# Patient Record
Sex: MALE | Race: WHITE | NOT HISPANIC OR LATINO | Employment: FULL TIME | URBAN - METROPOLITAN AREA
[De-identification: names, ages, dates, MRNs, and addresses within clinical notes are randomized per-mention and may not be internally consistent; named-entity substitution may affect disease eponyms.]

---

## 2017-01-03 DIAGNOSIS — I10 ESSENTIAL HYPERTENSION: Primary | ICD-10-CM

## 2017-01-03 RX ORDER — HYDROCHLOROTHIAZIDE 12.5 MG/1
12.5 TABLET ORAL DAILY
Qty: 90 TABLET | Refills: 3 | Status: SHIPPED | OUTPATIENT
Start: 2017-01-03 | End: 2017-05-16

## 2017-01-03 RX ORDER — AMLODIPINE BESYLATE 2.5 MG/1
TABLET ORAL
Qty: 180 TABLET | Refills: 3 | Status: SHIPPED | OUTPATIENT
Start: 2017-01-03 | End: 2018-01-08

## 2017-05-16 RX ORDER — HYDROCHLOROTHIAZIDE 12.5 MG/1
12.5 CAPSULE ORAL DAILY
Qty: 90 CAPSULE | Refills: 3 | COMMUNITY
Start: 2017-01-03 | End: 2018-01-08

## 2017-05-16 NOTE — TELEPHONE ENCOUNTER
Received a fax from [employer] @ [phone] regarding the hydrochlorothiazide is no longer covered in tablet form.  Remaining Rx was changed to capsules.  [facility] was informed and EPIC updated.    Carson Quiroz,   [facility]  [phone]

## 2017-05-31 DIAGNOSIS — Z76.0 ENCOUNTER FOR MEDICATION REFILL: ICD-10-CM

## 2017-05-31 RX ORDER — NAPROXEN 500 MG/1
TABLET ORAL
Qty: 60 TABLET | Refills: 2 | Status: SHIPPED | OUTPATIENT
Start: 2017-05-31 | End: 2017-09-03

## 2017-06-13 ENCOUNTER — OFFICE VISIT (OUTPATIENT)
Dept: FAMILY MEDICINE | Facility: CLINIC | Age: 62
End: 2017-06-13

## 2017-06-13 VITALS
WEIGHT: 207.6 LBS | OXYGEN SATURATION: 97 % | BODY MASS INDEX: 29.72 KG/M2 | DIASTOLIC BLOOD PRESSURE: 96 MMHG | HEART RATE: 78 BPM | RESPIRATION RATE: 20 BRPM | SYSTOLIC BLOOD PRESSURE: 152 MMHG | HEIGHT: 70 IN

## 2017-06-13 DIAGNOSIS — Z11.59 NEED FOR HEPATITIS C SCREENING TEST: ICD-10-CM

## 2017-06-13 DIAGNOSIS — R60.0 LOCALIZED EDEMA: ICD-10-CM

## 2017-06-13 DIAGNOSIS — F43.21 GRIEF REACTION: ICD-10-CM

## 2017-06-13 DIAGNOSIS — I83.893 VARICOSE VEINS OF BOTH LEGS WITH EDEMA: ICD-10-CM

## 2017-06-13 DIAGNOSIS — I10 ESSENTIAL HYPERTENSION: Primary | ICD-10-CM

## 2017-06-13 DIAGNOSIS — L82.0 INFLAMED SEBORRHEIC KERATOSIS: ICD-10-CM

## 2017-06-13 LAB
BACTERIA URINE: NORMAL
BILIRUB UR QL STRIP: 0
BLOOD URINE DIP: 0
CASTS/LPF: NORMAL
COLOR UR: YELLOW
CRYSTAL URINE: NORMAL
EPITHELIAL CELLS - QUEST: NORMAL
GLUCOSE UR STRIP-MCNC: 0 MG/DL
KETONES UR QL STRIP: 0
LEUKOCYTE ESTERASE URINE DIP: 0
MUCOUS URINE: NORMAL
NITRITE UR QL STRIP: NORMAL
PH UR STRIP: 7 PH (ref 5–9)
PROT UR QL: 0 MG/DL (ref ?–0.01)
RBC URINE: NORMAL (ref 0–3)
SP GR UR STRIP: 1.01 (ref 1–1.02)
UROBILINOGEN UR QL STRIP: 0.2 EU/DL (ref 0.2–1)
WBC URINE: NORMAL (ref 0–3)

## 2017-06-13 PROCEDURE — 99214 OFFICE O/P EST MOD 30 MIN: CPT | Mod: 25 | Performed by: FAMILY MEDICINE

## 2017-06-13 PROCEDURE — 36415 COLL VENOUS BLD VENIPUNCTURE: CPT | Performed by: FAMILY MEDICINE

## 2017-06-13 PROCEDURE — 80053 COMPREHEN METABOLIC PANEL: CPT | Mod: 90 | Performed by: FAMILY MEDICINE

## 2017-06-13 PROCEDURE — 81003 URINALYSIS AUTO W/O SCOPE: CPT | Performed by: FAMILY MEDICINE

## 2017-06-13 PROCEDURE — 17110 DESTRUCTION B9 LES UP TO 14: CPT | Performed by: FAMILY MEDICINE

## 2017-06-13 PROCEDURE — 86803 HEPATITIS C AB TEST: CPT | Mod: 90 | Performed by: FAMILY MEDICINE

## 2017-06-13 RX ORDER — POTASSIUM CHLORIDE 750 MG/1
TABLET, EXTENDED RELEASE ORAL DAILY
COMMUNITY
Start: 2015-08-27 | End: 2017-07-24

## 2017-06-13 NOTE — PROGRESS NOTES
Subjective:  Here to discuss the status of the following problem(s):(Unless noted the problem(s) is/are stable and if applicable the medicine(s) being used is/are causing no side effects or problems.)    CC: Swelling (ankles - left worse-vericose veins) and Derm Problem (left wrist- growth)    Multiple medical issues.    Problem #1 both his lower legs have edema.  His wife recently passed away and family members have been around more and noticed that his legs seem swollen.  He has some brown discoloration of the skin that he's noticed on the LEFT leg.  He does have some large veins in his legs but he says they've been present for several years,  Possibly more prominent recently.    Problem #2 essential hypertension currently on multiple medications is not had any lab work done in over a year medications include amlodipine 2.5 mg twice a day.  We discussed that amlodipine may be cured-like swollen.  He has been on this for a number of years.  Other medicine for blood pressure includes hydrochlorothiazide 12.5 mg daily    Problem #3 grief reaction.  His wife recently passed away he is in the process of grieving feels that he is well supported by his family who is been around recently.    he feels that he is managing reasonably well all things considered     ROS:  General:  NEGATIVE for fever, chills, change in weight CV:  NEGATIVE for chest pain, palpitations , no orthopnea Resp:  NEGATIVE for significant cough or SOB  Skin on left arm has rough area that won't heal present for year or two    Past Medical History:   Diagnosis Date     Dry skin 12/4/2014     Foot pain 11/6/2012    OA of both feet      HTN (hypertension)      HTN, goal below 140/90      Hyperlipidaemia LDL goal < 100      Hypokalemia 11/17/2011     Current Outpatient Prescriptions   Medication     potassium chloride SA (POTASSIUM CHLORIDE) 10 MEQ CR tablet     naproxen (NAPROSYN) 500 MG tablet     hydrochlorothiazide (MICROZIDE) 12.5 MG capsule      amLODIPine (NORVASC) 2.5 MG tablet     Multiple Vitamins-Minerals (QC MULTI-DIMA 50 & OVER PO)     Coenzyme Q10 (CO Q-10) 200 MG CAPS     Nutritional Supplements (GLUCOSAMINE COMPLEX PO)     Omega-3 Fatty Acids (FISH OIL) 1000 MG CPDR     No current facility-administered medications for this visit.       reports that he has never smoked. He has never used smokeless tobacco. He reports that he drinks alcohol. He reports that he does not use illicit drugs.      EXAM:  BP: 152/96   Pulse: 78      Wt Readings from Last 2 Encounters:   06/13/17 94.2 kg (207 lb 9.6 oz)   11/28/16 94.3 kg (207 lb 12.8 oz)       BMI= Body mass index is 29.79 kg/(m^2).    EXAM:   APPEARANCE: = Nrl  Conj/Eyelids = Nrl  Ears/Nose = Nrl  Neck = Nrl  Resp effort = Nrl  Breath Sounds = Nrl  Heart Rate, Rythym, & sounds (no Murm)  = Nrl  Carotid Art's = Nrl  Abdomen: soft, nontender, no masses, & bowel sounds = Nrl  Digits and Nails =Nrl  SKIN: mid left forearm  Rough brown 5 mm crust with sharp borders  Ext (edema) =  2+mid tibia  Large ropey veins left > right and mild pigmentation left shin   Recent/Remote Memory = Nrl  Mood/Affect =  Sad , insight good .      Cryo therapy to left forearm lesion 30 sec x2    Assessment/Plan:  Abdiaziz was seen today for swelling and derm problem.    Diagnoses and all orders for this visit:    Essential hypertension  Not well controlled REC follow up with Dr Wray  Localized edema  -     Comp. Metabolic Panel (14) ([facility])  -     Urinalysis w/reflex protein, bili ([facility])  -     GENERAL SURG ADULT REFERRAL  Discussed amlodipine as possibel source. Could try stoppin and then follow up Dr VIDAL  ALSO varicosities likely play a role - get jobst and see surgery to see if sherry for  Treatment of veins    Varicose veins of both legs with edema  -     GENERAL SURG ADULT REFERRAL    Inflamed seborrheic keratosis  -     DESTRUCT BENIGN LESION, UP TO 14  woound care discussed , if not resolved in 3  Weeks recheck    Need for  hepatitis C screening test  -     HCV Antibody ([facility])    Grief reaction  Doing well, encouraged call if not well              López Cohn  [facility]

## 2017-06-13 NOTE — PATIENT INSTRUCTIONS
This is a place you can go to be measured for Jobst or other type of compression stockings for edema.     [facility]:          [address], [address]                                    [place] [address]                                    Phone:  [phone]                                    Fax:  [phone]                                    Billing office:  [phone]    20- 30  MM HG PRESURE

## 2017-06-13 NOTE — MR AVS SNAPSHOT
After Visit Summary   6/13/2017    Abdiaziz Kimball    MRN: [id number]           Patient Information     Date Of Birth          1955        Visit Information        Provider Department      6/13/2017 7:45 AM López Cohn MD [facility]        Today's Diagnoses     Essential hypertension    -  1    Localized edema        Varicose veins of both legs with edema          Care Instructions    This is a place you can go to be measured for Jobst or other type of compression stockings for edema.     [facility]:          [address], [address]                                    [place] [address]                                    Phone:  [phone]                                    Fax:  [phone]                                    Billing office:  [phone]    20- 30  MM HG PRESURE           Follow-ups after your visit        Additional Services     GENERAL SURG ADULT REFERRAL       Your provider has referred you to: [facility]: [facility] [place] [phone]   [email or web address]  DR MCDANIEL OR DR ETIENNE FOR EVAL OF VARICOSE  VEINS AND EDEMA     Please be aware that coverage of these services is subject to the terms and limitations of your health insurance plan.  Call member services at your health plan with any benefit or coverage questions.      Please bring the following with you to your appointment:    (1) Any X-Rays, CTs or MRIs which have been performed.  Contact the facility where they were done to arrange for  prior to your scheduled appointment.   (2) List of current medications   (3) This referral request   (4) Any documents/labs given to you for this referral                  Who to contact     If you have questions or need follow up information about today's clinic visit or your schedule please contact  "[facility] directly at [phone].  Normal or non-critical lab and imaging results will be communicated to you by MyChart, letter or phone within 4 business days after the clinic has received the results. If you do not hear from us within 7 days, please contact the clinic through [employer] or phone. If you have a critical or abnormal lab result, we will notify you by phone as soon as possible.  Submit refill requests through [employer] or call your pharmacy and they will forward the refill request to us. Please allow 3 business days for your refill to be completed.          Additional Information About Your Visit        [employer] Information     [employer] gives you secure access to your electronic health record. If you see a primary care provider, you can also send messages to your care team and make appointments. If you have questions, please call your primary care clinic.  If you do not have a primary care provider, please call [phone] and they will assist you.        Care EveryWhere ID     This is your Care EveryWhere ID. This could be used by other organizations to access your [facility] medical records  [id number]        Your Vitals Were     Pulse Respirations Height Pulse Oximetry BMI (Body Mass Index)       78 20 1.778 m (5' 10\") 97% 29.79 kg/m2        Blood Pressure from Last 3 Encounters:   06/13/17 (!) 152/96   11/28/16 132/84   12/31/15 138/78    Weight from Last 3 Encounters:   06/13/17 94.2 kg (207 lb 9.6 oz)   11/28/16 94.3 kg (207 lb 12.8 oz)   12/31/15 91.6 kg (202 lb)              We Performed the Following     Comp. Metabolic Panel (14) ([facility])     GENERAL SURG ADULT REFERRAL     Urinalysis w/reflex protein, bili ([facility])          Today's Medication Changes          These changes are accurate as of: 6/13/17  8:02 AM.  If you have any questions, ask your nurse or doctor.               These medicines have changed or have updated prescriptions.        Dose/Directions    potassium chloride " 10 MEQ CR tablet   This may have changed:  Another medication with the same name was removed. Continue taking this medication, and follow the directions you see here.   Generic drug:  potassium chloride SA   Changed by:  López Cohn MD        Take by mouth daily   Refills:  0                Primary Care Provider Office Phone # Fax #    El Wray [phone]       [facility] [address]  [facility] [address]        Thank you!     Thank you for choosing [facility]  for your care. Our goal is always to provide you with excellent care. Hearing back from our patients is one way we can continue to improve our services. Please take a few minutes to complete the written survey that you may receive in the mail after your visit with us. Thank you!             Your Updated Medication List - Protect others around you: Learn how to safely use, store and throw away your medicines at www.disposemymeds.org.          This list is accurate as of: 6/13/17  8:02 AM.  Always use your most recent med list.                   Brand Name Dispense Instructions for use    amLODIPine 2.5 MG tablet    NORVASC    180 tablet    TAKE ONE TABLET BY MOUTH TWO TIMES A DAY       coenzyme Q-10 200 MG Caps      Take  by mouth daily.       Fish Oil 1000 MG Cpdr      Take  by mouth. Takes 2000mg       GLUCOSAMINE COMPLEX PO      Take 300 mg by mouth.       hydrochlorothiazide 12.5 MG capsule    MICROZIDE    90 capsule    Take 1 capsule (12.5 mg) by mouth daily       naproxen 500 MG tablet    NAPROSYN    60 tablet    TAKE ONE TABLET BY MOUTH TWO TIMES A DAY WITH MEALS       potassium chloride 10 MEQ CR tablet   Generic drug:  potassium chloride SA      Take by mouth daily       QC MULTI-DIMA 50 & OVER PO      Take  by mouth daily.

## 2017-06-14 LAB
ALBUMIN SERPL-MCNC: 4.4 G/DL (ref 3.6–4.8)
ALBUMIN/GLOB SERPL: 1.8 {RATIO} (ref 1.2–2.2)
ALP SERPL-CCNC: 76 IU/L (ref 39–117)
ALT SERPL-CCNC: 16 IU/L (ref 0–44)
AST SERPL-CCNC: 17 IU/L (ref 0–40)
BILIRUB SERPL-MCNC: 0.6 MG/DL (ref 0–1.2)
BUN SERPL-MCNC: 24 MG/DL (ref 8–27)
BUN/CREATININE RATIO: 33 (ref 10–24)
CALCIUM SERPL-MCNC: 8.8 MG/DL (ref 8.6–10.2)
CHLORIDE SERPLBLD-SCNC: 102 MMOL/L (ref 96–106)
CREAT SERPL-MCNC: 0.73 MG/DL (ref 0.76–1.27)
EGFR IF AFRICN AM: 114 ML/MIN/1.73
EGFR IF NONAFRICN AM: 99 ML/MIN/1.73
GLOBULIN, TOTAL: 2.5 G/DL (ref 1.5–4.5)
GLUCOSE SERPL-MCNC: 93 MG/DL (ref 65–99)
HCV AB SERPL QL IA: <0.1 S/CO RATIO (ref 0–0.9)
POTASSIUM SERPL-SCNC: 3.2 MMOL/L (ref 3.5–5.2)
PROT SERPL-MCNC: 6.9 G/DL (ref 6–8.5)
SODIUM SERPL-SCNC: 144 MMOL/L (ref 134–144)
TOTAL CO2: 25 MMOL/L (ref 18–28)

## 2017-06-15 ENCOUNTER — TELEPHONE (OUTPATIENT)
Dept: FAMILY MEDICINE | Facility: CLINIC | Age: 62
End: 2017-06-15

## 2017-06-15 DIAGNOSIS — E87.6 LOW SERUM POTASSIUM: Primary | ICD-10-CM

## 2017-06-15 NOTE — TELEPHONE ENCOUNTER
Patient returned phone call for results.  Results given per Dr. Knowles, patient has enough K+ to take x 10 days.  He will RTC in 10 days for repeat K+ level, this order is entered.  Yamilka Rios

## 2017-06-15 NOTE — TELEPHONE ENCOUNTER
----- Message from López Cohn MD sent at 6/14/2017  5:49 PM CDT -----  Yamilka / Maggie- call to be sure Varun see this. His potassium is too low needs to increase his potassium to 20 carlos daily and repeat his K+ in 10 days. The rest of labs ok. please update med list. Camilo Cohn MD

## 2017-06-19 ENCOUNTER — OFFICE VISIT (OUTPATIENT)
Dept: FAMILY MEDICINE | Facility: CLINIC | Age: 62
End: 2017-06-19

## 2017-06-19 VITALS
DIASTOLIC BLOOD PRESSURE: 78 MMHG | TEMPERATURE: 97.6 F | BODY MASS INDEX: 30.66 KG/M2 | SYSTOLIC BLOOD PRESSURE: 132 MMHG | OXYGEN SATURATION: 96 % | HEIGHT: 69 IN | HEART RATE: 84 BPM | WEIGHT: 207 LBS | RESPIRATION RATE: 16 BRPM

## 2017-06-19 DIAGNOSIS — S60.417A ABRASION OF LEFT LITTLE FINGER, INITIAL ENCOUNTER: Primary | ICD-10-CM

## 2017-06-19 PROCEDURE — 99213 OFFICE O/P EST LOW 20 MIN: CPT | Performed by: FAMILY MEDICINE

## 2017-06-19 NOTE — MR AVS SNAPSHOT
After Visit Summary   6/19/2017    Abdiaziz Kimball    MRN: [id number]           Patient Information     Date Of Birth          1955        Visit Information        Provider Department      6/19/2017 4:15 PM Lela Mejia MD [facility]        Care Instructions     [facility]     Instructions following Minor Surgery  1) Keep the wound clean and dry for 24 hours.  After this time you may take a shower or bath, then PAT WOUND DRY.       Apply a small amount of antibiotic ointment (Bacitracin, Polysporin, etc.) . Keep covered with a bandage.  Repeat       This process anytime the wound gets wet.    2)  Watch for signs of infection such as redness, swelling, warmth or drainage.  If you note any of these symptoms,        Please call our office  ([phone]) and schedule an appointment to see a physician.  If you have any questions regarding        Your wound, please call our nurse or after hours answering service.    .    RETURN APPOINTMENT - as needed              Follow-ups after your visit        Your next 10 appointments already scheduled     Jul 24, 2017 10:00 AM CDT   SHORT with El Wray MD   [facility] ([facility])    [address] [place] [address] [phone]              Who to contact     If you have questions or need follow up information about today's clinic visit or your schedule please contact [facility] directly at [phone].  Normal or non-critical lab and imaging results will be communicated to you by MyChart, letter or phone within 4 business days after the clinic has received the results. If you do not hear from us within 7 days, please contact the clinic through MyChart or phone. If you have a critical or abnormal lab result, we will notify you by phone as soon as possible.  Submit refill requests through [employer] or call your pharmacy and they will forward the refill request to  "us. Please allow 3 business days for your refill to be completed.          Additional Information About Your Visit        MyChart Information     [employer] gives you secure access to your electronic health record. If you see a primary care provider, you can also send messages to your care team and make appointments. If you have questions, please call your primary care clinic.  If you do not have a primary care provider, please call [phone] and they will assist you.        Care EveryWhere ID     This is your Care EveryWhere ID. This could be used by other organizations to access your [facility] medical records  [id number]        Your Vitals Were     Pulse Temperature Respirations Height Pulse Oximetry BMI (Body Mass Index)    84 97.6  F (36.4  C) (Oral) 16 1.753 m (5' 9\") 96% 30.57 kg/m2       Blood Pressure from Last 3 Encounters:   06/19/17 132/78   06/13/17 (!) 152/96   11/28/16 132/84    Weight from Last 3 Encounters:   06/19/17 93.9 kg (207 lb)   06/13/17 94.2 kg (207 lb 9.6 oz)   11/28/16 94.3 kg (207 lb 12.8 oz)              Today, you had the following     No orders found for display       Primary Care Provider Office Phone # Fax #    El Wray [phone]       [facility] [address]  [facility] [address]        Thank you!     Thank you for choosing [facility]  for your care. Our goal is always to provide you with excellent care. Hearing back from our patients is one way we can continue to improve our services. Please take a few minutes to complete the written survey that you may receive in the mail after your visit with us. Thank you!             Your Updated Medication List - Protect others around you: Learn how to safely use, store and throw away your medicines at www.disposemymeds.org.          This list is accurate as of: 6/19/17  4:26 PM.  Always use your most recent med list.                   Brand Name Dispense Instructions for use    " amLODIPine 2.5 MG tablet    NORVASC    180 tablet    TAKE ONE TABLET BY MOUTH TWO TIMES A DAY       coenzyme Q-10 200 MG Caps      Take  by mouth daily.       Fish Oil 1000 MG Cpdr      Take  by mouth. Takes 2000mg       GLUCOSAMINE COMPLEX PO      Take 300 mg by mouth.       hydrochlorothiazide 12.5 MG capsule    MICROZIDE    90 capsule    Take 1 capsule (12.5 mg) by mouth daily       naproxen 500 MG tablet    NAPROSYN    60 tablet    TAKE ONE TABLET BY MOUTH TWO TIMES A DAY WITH MEALS       potassium chloride 10 MEQ CR tablet   Generic drug:  potassium chloride SA      Take by mouth daily       QC MULTI-DIMA 50 & OVER PO      Take  by mouth daily.

## 2017-06-19 NOTE — PATIENT INSTRUCTIONS
[facility]     Instructions following Minor Surgery  1) Keep the wound clean and dry for 24 hours.  After this time you may take a shower or bath, then PAT WOUND DRY.       Apply a small amount of antibiotic ointment (Bacitracin, Polysporin, etc.) . Keep covered with a bandage.  Repeat       This process anytime the wound gets wet.    2)  Watch for signs of infection such as redness, swelling, warmth or drainage.  If you note any of these symptoms,        Please call our office  ([phone]) and schedule an appointment to see a physician.  If you have any questions regarding        Your wound, please call our nurse or after hours answering service.    .    RETURN APPOINTMENT - as needed

## 2017-06-19 NOTE — PROGRESS NOTES
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  Dropped and hand got pinched left hand pinkie finger abrasion with bleeding  Tetanus Up to date    LHD    Past Medical History:   Diagnosis Date     Dry skin 12/4/2014     Foot pain 11/6/2012    OA of both feet      HTN (hypertension)      HTN, goal below 140/90      Hyperlipidaemia LDL goal < 100      Hypokalemia 11/17/2011     Past Surgical History:   Procedure Laterality Date     ARTHROSCOPY KNEE RT/LT Left 1990's     ARTHROSCOPY KNEE RT/LT Left      ARTHROSCOPY KNEE RT/LT Left     Leon, Mark     ARTHROSCOPY KNEE RT/LT Left 2003ish    Mark Quintero     ARTHROSCOPY KNEE RT/LT Right 2005    Mark Quintero     HERNIA REPAIR, INGUINAL RT/LT Right 2004    Incarerated hernia with resection, Dr. Moraes     VASECTOMY  1988     Reviewed medication and allergy lists    White male with glasses   ROS: 10 point ROS neg other than the symptoms noted above in the HPI.  No functional loss per his report  /78  Pulse 84  Temp 97.6  F (36.4  C) (Oral)  Resp 16  Ht 1.753 m (5' 9\")  Wt 93.9 kg (207 lb)  SpO2 96%  BMI 30.57 kg/m2   NAD holding pressure on the wound to control the bleeding.   This patient was seen in clinic for workman's comp. Injury to the left pinkie finger.  Skin abrasion with area of shredded flap. Wound dressing was placed after cleansing with betadine soak and bacitracin topical ointment . (nothing needed stitches) normal pulses and movement of hand. These are shallow.   Needs to stay covered and dry. Work as tolerated. Patient given some dressing supplies.  Tetanus was up to date.    Recheck wound check as needed.    Report any fever or red streaking up arm.     Forms done for work.  Letter done for work.    ASSESSMENT / PLAN:  (A36.235I) Abrasion of left little finger, initial encounter  (primary encounter diagnosis)  Comment: work comp  Plan: wound care    Needs to stay covered and dry. " Work as tolerated. Patient given some dressing supplies.  Tetanus was up to date.    Recheck wound check as needed.    Report any fever or red streaking up arm.     Lela Mejia MD, MEd

## 2017-07-11 ENCOUNTER — APPOINTMENT (OUTPATIENT)
Dept: VASCULAR SURGERY | Facility: CLINIC | Age: 62
End: 2017-07-11
Payer: COMMERCIAL

## 2017-07-11 PROCEDURE — 99207 ZZC VEINSOLUTIONS FREE SCREENING: CPT | Performed by: SURGERY

## 2017-07-17 DIAGNOSIS — Z76.0 ENCOUNTER FOR MEDICATION REFILL: ICD-10-CM

## 2017-07-17 RX ORDER — POTASSIUM CHLORIDE 750 MG/1
TABLET, EXTENDED RELEASE ORAL
Qty: 60 TABLET | Refills: 1 | Status: SHIPPED | OUTPATIENT
Start: 2017-07-17 | End: 2017-09-13

## 2017-07-17 NOTE — TELEPHONE ENCOUNTER
potassium chloride last OV & labs 6/13/17  Angelique Camilo MA July 17, 2017 8:40 AM    BP Readings from Last 3 Encounters:   06/19/17 132/78   06/13/17 (!) 152/96   11/28/16 132/84     Last Basic Metabolic Panel:  Lab Results   Component Value Date     06/13/2017      Lab Results   Component Value Date    POTASSIUM 3.2 06/13/2017     Lab Results   Component Value Date    CHLORIDE 102 06/13/2017     Lab Results   Component Value Date    TARAN 8.8 06/13/2017     No results found for: CO2  Lab Results   Component Value Date    BUN 24 06/13/2017    BUN 33 06/13/2017     Lab Results   Component Value Date    CR 0.73 06/13/2017     Lab Results   Component Value Date    GLC 93 06/13/2017

## 2017-07-24 ENCOUNTER — OFFICE VISIT (OUTPATIENT)
Dept: FAMILY MEDICINE | Facility: CLINIC | Age: 62
End: 2017-07-24

## 2017-07-24 VITALS
HEART RATE: 69 BPM | DIASTOLIC BLOOD PRESSURE: 78 MMHG | SYSTOLIC BLOOD PRESSURE: 130 MMHG | BODY MASS INDEX: 30.27 KG/M2 | OXYGEN SATURATION: 97 % | WEIGHT: 205 LBS

## 2017-07-24 DIAGNOSIS — I10 BENIGN ESSENTIAL HYPERTENSION: Primary | ICD-10-CM

## 2017-07-24 DIAGNOSIS — M79.673 PAIN OF FOOT, UNSPECIFIED LATERALITY: ICD-10-CM

## 2017-07-24 PROCEDURE — 36415 COLL VENOUS BLD VENIPUNCTURE: CPT | Performed by: FAMILY MEDICINE

## 2017-07-24 PROCEDURE — 80048 BASIC METABOLIC PNL TOTAL CA: CPT | Mod: 90 | Performed by: FAMILY MEDICINE

## 2017-07-24 PROCEDURE — 99213 OFFICE O/P EST LOW 20 MIN: CPT | Performed by: FAMILY MEDICINE

## 2017-07-24 NOTE — MR AVS SNAPSHOT
After Visit Summary   7/24/2017    Abdiaziz Kimball    MRN: [id number]           Patient Information     Date Of Birth          1955        Visit Information        Provider Department      7/24/2017 10:00 AM El Wray MD [facility]        Today's Diagnoses     Benign essential hypertension    -  1    Pain of foot, unspecified laterality           Follow-ups after your visit        Your next 10 appointments already scheduled     Aug 08, 2017  9:00 AM CDT   Ultrasound with  [facility] Lab   [facility]    [address]  [facility] [address] [phone]            Aug 08, 2017 10:00 AM CDT   Ultrasound Results with Adama Greenwood MD   [facility]    [address]  [facility] [address] [phone]              Who to contact     If you have questions or need follow up information about today's clinic visit or your schedule please contact [facility] directly at [phone].  Normal or non-critical lab and imaging results will be communicated to you by [employer], letter or phone within 4 business days after the clinic has received the results. If you do not hear from us within 7 days, please contact the clinic through [employer] or phone. If you have a critical or abnormal lab result, we will notify you by phone as soon as possible.  Submit refill requests through [employer] or call your pharmacy and they will forward the refill request to us. Please allow 3 business days for your refill to be completed.          Additional Information About Your Visit        G[employer]dithart Information     [employer] gives you secure access to your electronic health record. If you see a primary care provider, you can also send messages to your care team and make appointments. If you have questions, please call your primary care  clinic.  If you do not have a primary care provider, please call [phone] and they will assist you.        Care EveryWhere ID     This is your Care EveryWhere ID. This could be used by other organizations to access your [facility] medical records  [id number]        Your Vitals Were     Pulse Pulse Oximetry BMI (Body Mass Index)             69 97% 30.27 kg/m2          Blood Pressure from Last 3 Encounters:   07/24/17 130/78   06/19/17 132/78   06/13/17 (!) 152/96    Weight from Last 3 Encounters:   07/24/17 93 kg (205 lb)   06/19/17 93.9 kg (207 lb)   06/13/17 94.2 kg (207 lb 9.6 oz)              We Performed the Following     Basic Metabolic Panel (8) ([facility])        Primary Care Provider Office Phone # Fax #    El Wray [phone]       [facility] [address] [place] [address]        Equal Access to Services     XIOMY TAVAREZ : Hadii aad ku hadasho Soomaali, waaxda luqadaha, qaybta kaalmada adeegyada, waxay idiin hayalidan анна kharabridget sears . So [facility] [phone].    ATENCIÓN: Si habla español, tiene a morales disposición servicios gratuitos de asistencia lingüística. Llame al [phone].    We comply with applicable federal civil rights laws and [place] laws. We do not discriminate on the basis of race, color, national origin, age, disability sex, sexual orientation or gender identity.            Thank you!     Thank you for choosing [facility]  for your care. Our goal is always to provide you with excellent care. Hearing back from our patients is one way we can continue to improve our services. Please take a few minutes to complete the written survey that you may receive in the mail after your visit with us. Thank you!             Your Updated Medication List - Protect others around you: Learn how to safely use, store and throw away your medicines at www.disposemymeds.org.          This list is accurate as of: 7/24/17 10:38 AM.  Always use  your most recent med list.                   Brand Name Dispense Instructions for use Diagnosis    amLODIPine 2.5 MG tablet    NORVASC    180 tablet    TAKE ONE TABLET BY MOUTH TWO TIMES A DAY    Essential hypertension       coenzyme Q-10 200 MG Caps      Take  by mouth daily.        Fish Oil 1000 MG Cpdr      Take  by mouth. Takes 2000mg        GLUCOSAMINE COMPLEX PO      Take 300 mg by mouth.        hydrochlorothiazide 12.5 MG capsule    MICROZIDE    90 capsule    Take 1 capsule (12.5 mg) by mouth daily        naproxen 500 MG tablet    NAPROSYN    60 tablet    TAKE ONE TABLET BY MOUTH TWO TIMES A DAY WITH MEALS    Encounter for medication refill       potassium chloride 10 MEQ CR tablet   Generic drug:  potassium chloride SA     60 tablet    TAKE ONE TABLET BY MOUTH TWICE DAILY    Encounter for medication refill       QC MULTI-DIMA 50 & OVER PO      Take  by mouth daily.        vitamin B complex with vitamin C Tabs tablet      Take 1 tablet by mouth daily

## 2017-07-24 NOTE — PROGRESS NOTES
Subjective:   Abdiaziz Kimball is a 61 year old male with hypertension.    Long discussion about wife passing    Current Outpatient Prescriptions   Medication Sig Dispense Refill     vitamin B complex with vitamin C (VITAMIN  B COMPLEX) TABS tablet Take 1 tablet by mouth daily       POTASSIUM CHLORIDE 10 MEQ CR tablet TAKE ONE TABLET BY MOUTH TWICE DAILY  60 tablet 1     naproxen (NAPROSYN) 500 MG tablet TAKE ONE TABLET BY MOUTH TWO TIMES A DAY WITH MEALS 60 tablet 2     hydrochlorothiazide (MICROZIDE) 12.5 MG capsule Take 1 capsule (12.5 mg) by mouth daily 90 capsule 3     amLODIPine (NORVASC) 2.5 MG tablet TAKE ONE TABLET BY MOUTH TWO TIMES A  tablet 3     Multiple Vitamins-Minerals (QC MULTI-DIMA 50 & OVER PO) Take  by mouth daily.       Coenzyme Q10 (CO Q-10) 200 MG CAPS Take  by mouth daily.       Nutritional Supplements (GLUCOSAMINE COMPLEX PO) Take 300 mg by mouth.       Omega-3 Fatty Acids (FISH OIL) 1000 MG CPDR Take  by mouth. Takes 2000mg        Hypertension ROS: taking medications as instructed, no medication side effects noted, no TIA's, no chest pain on exertion, no dyspnea on exertion, no swelling of ankles.   New concerns: needs to follow up on the potassium.     Objective:   /78  Pulse 69  Wt 93 kg (205 lb)  SpO2 97%  BMI 30.27 kg/m2   Appearance healthy, alert and cooperative.  General exam BP noted to be well controlled today in office, S1, S2 normal, no gallop, no murmur, chest clear, no JVD, no HSM, no edema, CVS exam  - S1, S2 normal, no murmur, click, rub or gallop, regular rate and rhythm, chest is clear without rales or wheezing, no pedal edema, no JVD, no hepatosplenomegaly.   Lab review: orders written for new lab studies as appropriate; see orders.     Assessment:    Hypertension well controlled.     Plan:   current treatment plan is effective, no change in therapy.

## 2017-07-25 LAB
BUN SERPL-MCNC: 21 MG/DL (ref 8–27)
BUN/CREATININE RATIO: 28 (ref 10–24)
CALCIUM SERPL-MCNC: 9 MG/DL (ref 8.6–10.2)
CHLORIDE SERPLBLD-SCNC: 107 MMOL/L (ref 96–106)
CREAT SERPL-MCNC: 0.75 MG/DL (ref 0.76–1.27)
EGFR IF AFRICN AM: 113 ML/MIN/1.73
EGFR IF NONAFRICN AM: 98 ML/MIN/1.73
GLUCOSE SERPL-MCNC: 87 MG/DL (ref 65–99)
POTASSIUM SERPL-SCNC: 3.6 MMOL/L (ref 3.5–5.2)
SODIUM SERPL-SCNC: 145 MMOL/L (ref 134–144)
TOTAL CO2: 24 MMOL/L (ref 18–28)

## 2017-07-27 NOTE — PROGRESS NOTES
Dear Abdiaziz,   I am writing to report that your included test results are within expected ranges. I do not suggest that we make any changes at this time.    El Wray M.D.

## 2017-08-08 ENCOUNTER — OFFICE VISIT (OUTPATIENT)
Dept: VASCULAR SURGERY | Facility: CLINIC | Age: 62
End: 2017-08-08
Payer: COMMERCIAL

## 2017-08-08 ENCOUNTER — APPOINTMENT (OUTPATIENT)
Dept: VASCULAR SURGERY | Facility: CLINIC | Age: 62
End: 2017-08-08
Payer: COMMERCIAL

## 2017-08-08 DIAGNOSIS — Z53.9 ERRONEOUS ENCOUNTER--DISREGARD: Primary | ICD-10-CM

## 2017-08-08 PROCEDURE — 93971 EXTREMITY STUDY: CPT | Performed by: SURGERY

## 2017-08-08 PROCEDURE — 99203 OFFICE O/P NEW LOW 30 MIN: CPT | Performed by: SURGERY

## 2017-08-08 NOTE — MR AVS SNAPSHOT
After Visit Summary   8/8/2017    Abdiaziz Kimball    MRN: [id number]           Patient Information     Date Of Birth          1955        Visit Information        Provider Department      8/8/2017 10:00 AM Adama Greenwood MD [facility]      Today's Diagnoses     ERRONEOUS ENCOUNTER--DISREGARD    -  1       Follow-ups after your visit        Who to contact     If you have questions or need follow up information about today's clinic visit or your schedule please contact [facility] directly at [phone].  Normal or non-critical lab and imaging results will be communicated to you by [employer], letter or phone within 4 business days after the clinic has received the results. If you do not hear from us within 7 days, please contact the clinic through [employer] or phone. If you have a critical or abnormal lab result, we will notify you by phone as soon as possible.  Submit refill requests through [employer] or call your pharmacy and they will forward the refill request to us. Please allow 3 business days for your refill to be completed.          Additional Information About Your Visit        MyChart Information     [employer] gives you secure access to your electronic health record. If you see a primary care provider, you can also send messages to your care team and make appointments. If you have questions, please call your primary care clinic.  If you do not have a primary care provider, please call [phone] and they will assist you.        Care EveryWhere ID     This is your Care EveryWhere ID. This could be used by other organizations to access your [facility] medical records  [id number]         Blood Pressure from Last 3 Encounters:   03/05/18 108/72   12/07/17 118/82   07/24/17 130/78    Weight from Last 3 Encounters:   03/05/18 189 lb 9.6 oz (86 kg)   12/07/17 193 lb (87.5 kg)   07/24/17 205 lb (93 kg)               Today, you had the following     No orders found for display       Primary Care Provider Office Phone # Fax #    El Wray [phone] [address]  [facility] [address]        Equal Access to Services     SONIDOSATNAM GRIMALDOYENNIFER : Hadii aad ku hadradhao Soomaali, waaxda luqadaha, qaybta kaalmada adeegyada, waxrubén bettinain tejan adegeneva meier laevn ah. So [facility] [phone].    ATENCIÓN: Si habla español, tiene a morales disposición servicios gratuitos de asistencia lingüística. Llame al [phone].    We comply with applicable federal civil rights laws and [place] laws. We do not discriminate on the basis of race, color, national origin, age, disability, sex, sexual orientation, or gender identity.            Thank you!     Thank you for choosing [facility]  for your care. Our goal is always to provide you with excellent care. Hearing back from our patients is one way we can continue to improve our services. Please take a few minutes to complete the written survey that you may receive in the mail after your visit with us. Thank you!             Your Updated Medication List - Protect others around you: Learn how to safely use, store and throw away your medicines at www.disposemymeds.org.          This list is accurate as of 8/8/17 11:59 PM.  Always use your most recent med list.                   Brand Name Dispense Instructions for use Diagnosis    coenzyme Q-10 200 MG Caps      Take  by mouth daily.        Fish Oil 1000 MG Cpdr      Take  by mouth. Takes 2000mg        GLUCOSAMINE COMPLEX PO      Take 300 mg by mouth.        QC MULTI-DIMA 50 & OVER PO      Take  by mouth daily.        vitamin B complex with vitamin C Tabs tablet      Take 1 tablet by mouth daily

## 2017-08-08 NOTE — PROGRESS NOTES
[facility]    Abdiaziz Kimball presents today for evaluation of prominent left calf varicosities.  He is a 61-year-old  who notes left ankle edema and some left calf aching at the end of the work day.  He occasionally requires naproxen for pain.  He has a familial history of varicose vein disease.  There is no prior history of venous intervention.  Past medical history is most notable for hypertension and multiple prior knee surgeries.  He is a nonsmoker who occasionally consumes alcohol.    Exam  Well-developed well-nourished male in no acute distress.  Blood pressure 131/81 with a pulse of 65.  The left lower extremity is notable for moderate ropey varicosities on the medial and pretibial aspects of the calf.  Trace left ankle edema.  Easily palpable dorsalis pedis pulses bilaterally.  The remainder of his physical examination was within normal limits.    Imaging:  Left leg venous ultrasound was performed in our office today.  His deep veins are normal.  There is incompetence of the left greater saphenous vein from the saphenofemoral junction to the mid calf.  The vein measures 7.6 mm at the junction and 6.7 mm distally.  It gives rise to a 9.2 mm varicosity in the proximal calf.  Remaining left leg superficial veins are unremarkable.    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    PLAN:  I had a nice discussion with Varun reviewing all the above.  Per the mandates of his insurance company we will begin with 3 months of conservative therapy utilizing compression stockings of 20-30 mmHg pressure.  He has been measured for those stockings but has not yet required them.  Anatomically he is a potential candidate for radiofrequency ablation of the left greater saphenous vein.  Ultimate treatment recommendations will be pending the outcome of our interaction once he completes 3 months of conservative therapy.    Total  face-to-face time was 30 minutes, greater than 50% spent providing counseling and education.    Macho Greenwood M.D.    Please carbon copy Dr. El Wray of the [facility]

## 2017-09-03 DIAGNOSIS — Z76.0 ENCOUNTER FOR MEDICATION REFILL: ICD-10-CM

## 2017-09-05 RX ORDER — NAPROXEN 500 MG/1
TABLET ORAL
Qty: 60 TABLET | Refills: 1 | Status: SHIPPED | OUTPATIENT
Start: 2017-09-05 | End: 2018-01-30

## 2017-09-13 DIAGNOSIS — Z76.0 ENCOUNTER FOR MEDICATION REFILL: ICD-10-CM

## 2017-09-13 NOTE — TELEPHONE ENCOUNTER
BP Medication refill    Name of medication/dose: POTASSIUM CHLORIDE 10 MEQ CR tablet; take one tablet BID; Last OV 7/15/2017    Last fill: 7/17/2017; qty 60 with 1 refill     BP Readings from Last 3 Encounters:   07/24/17 130/78   06/19/17 132/78   06/13/17 (!) 152/96         BMP within 6 months? YES     Last Basic Metabolic Panel:  Lab Results   Component Value Date     07/24/2017      Lab Results   Component Value Date    POTASSIUM 3.6 07/24/2017     Lab Results   Component Value Date    CHLORIDE 107 07/24/2017     Lab Results   Component Value Date    TARAN 9.0 07/24/2017     No results found for: CO2  Lab Results   Component Value Date    BUN 21 07/24/2017    BUN 28 07/24/2017     Lab Results   Component Value Date    CR 0.75 07/24/2017     Lab Results   Component Value Date    GLC 87 07/24/2017

## 2017-09-15 RX ORDER — POTASSIUM CHLORIDE 750 MG/1
TABLET, EXTENDED RELEASE ORAL
Qty: 60 TABLET | Refills: 0 | Status: SHIPPED | OUTPATIENT
Start: 2017-09-15 | End: 2017-10-15

## 2017-10-29 DIAGNOSIS — Z76.0 ENCOUNTER FOR MEDICATION REFILL: ICD-10-CM

## 2017-10-29 RX ORDER — POTASSIUM CHLORIDE 750 MG/1
TABLET, EXTENDED RELEASE ORAL
Qty: 60 TABLET | Refills: 0 | Status: SHIPPED | OUTPATIENT
Start: 2017-10-29 | End: 2018-03-05

## 2017-10-31 DIAGNOSIS — Z76.0 ENCOUNTER FOR MEDICATION REFILL: ICD-10-CM

## 2017-10-31 DIAGNOSIS — I10 ESSENTIAL HYPERTENSION: Primary | ICD-10-CM

## 2017-10-31 NOTE — TELEPHONE ENCOUNTER
potassium chloride last OV - & labs 7/24/17  Angelique Camilo MA October 31, 2017 4:43 PM    BP Readings from Last 3 Encounters:   07/24/17 130/78   06/19/17 132/78   06/13/17 (!) 152/96     Last Basic Metabolic Panel:  Lab Results   Component Value Date     07/24/2017      Lab Results   Component Value Date    POTASSIUM 3.6 07/24/2017     Lab Results   Component Value Date    CHLORIDE 107 07/24/2017     Lab Results   Component Value Date    TARAN 9.0 07/24/2017     No results found for: CO2  Lab Results   Component Value Date    BUN 21 07/24/2017    BUN 28 07/24/2017     Lab Results   Component Value Date    CR 0.75 07/24/2017     Lab Results   Component Value Date    GLC 87 07/24/2017

## 2017-11-02 RX ORDER — POTASSIUM CHLORIDE 750 MG/1
TABLET, EXTENDED RELEASE ORAL
Qty: 60 TABLET | Refills: 3 | Status: SHIPPED | OUTPATIENT
Start: 2017-11-02 | End: 2018-02-25

## 2017-11-28 DIAGNOSIS — Z00.00 ENCOUNTER FOR ROUTINE ADULT HEALTH EXAMINATION WITHOUT ABNORMAL FINDINGS: ICD-10-CM

## 2017-11-28 DIAGNOSIS — Z13.220 SCREENING FOR LIPOID DISORDERS: ICD-10-CM

## 2017-11-28 DIAGNOSIS — I10 ESSENTIAL HYPERTENSION: Primary | ICD-10-CM

## 2017-11-28 LAB
% GRANULOCYTES: 57.5 % (ref 42.2–75.2)
HCT VFR BLD AUTO: 44.4 % (ref 39–51)
HEMOGLOBIN: 14.8 G/DL (ref 13.4–17.5)
LYMPHOCYTES NFR BLD AUTO: 31.2 % (ref 20.5–51.1)
MCH RBC QN AUTO: 29.8 PG (ref 27–31)
MCHC RBC AUTO-ENTMCNC: 33.3 G/DL (ref 33–37)
MCV RBC AUTO: 89.5 FL (ref 80–100)
MONOCYTES NFR BLD AUTO: 11.3 % (ref 1.7–9.3)
PLATELET # BLD AUTO: 199 K/UL (ref 140–450)
RBC # BLD AUTO: 4.96 X10/CMM (ref 4.2–5.9)
WBC # BLD AUTO: 4.9 X10/CMM (ref 3.8–11)

## 2017-11-28 PROCEDURE — 85025 COMPLETE CBC W/AUTO DIFF WBC: CPT | Performed by: FAMILY MEDICINE

## 2017-11-28 PROCEDURE — 36415 COLL VENOUS BLD VENIPUNCTURE: CPT | Performed by: FAMILY MEDICINE

## 2017-11-29 LAB
ALBUMIN SERPL-MCNC: 4.3 G/DL (ref 3.6–4.8)
ALBUMIN/GLOB SERPL: 1.9 {RATIO} (ref 1.2–2.2)
ALP SERPL-CCNC: 80 IU/L (ref 39–117)
ALT SERPL-CCNC: 12 IU/L (ref 0–44)
AST SERPL-CCNC: 17 IU/L (ref 0–40)
BILIRUB SERPL-MCNC: 0.7 MG/DL (ref 0–1.2)
BUN SERPL-MCNC: 23 MG/DL (ref 8–27)
BUN/CREATININE RATIO: 29 (ref 10–24)
CALCIUM SERPL-MCNC: 8.7 MG/DL (ref 8.6–10.2)
CHLORIDE SERPLBLD-SCNC: 101 MMOL/L (ref 96–106)
CHOLEST SERPL-MCNC: 175 MG/DL (ref 100–199)
CREAT SERPL-MCNC: 0.79 MG/DL (ref 0.76–1.27)
EGFR IF AFRICN AM: 110 ML/MIN/1.73
EGFR IF NONAFRICN AM: 95 ML/MIN/1.73
GLOBULIN, TOTAL: 2.3 G/DL (ref 1.5–4.5)
GLUCOSE SERPL-MCNC: 87 MG/DL (ref 65–99)
HDLC SERPL-MCNC: 43 MG/DL
LDL/HDL RATIO: 2.6 RATIO UNITS (ref 0–3.6)
LDLC SERPL CALC-MCNC: 112 MG/DL (ref 0–99)
POTASSIUM SERPL-SCNC: 3.3 MMOL/L (ref 3.5–5.2)
PROT SERPL-MCNC: 6.6 G/DL (ref 6–8.5)
SODIUM SERPL-SCNC: 142 MMOL/L (ref 134–144)
TOTAL CO2: 26 MMOL/L (ref 18–28)
TRIGL SERPL-MCNC: 99 MG/DL (ref 0–149)
VLDLC SERPL CALC-MCNC: 20 MG/DL (ref 5–40)

## 2017-11-30 LAB — HBA1C MFR BLD: 5.2 % (ref 4.8–5.6)

## 2017-12-02 LAB
COTININE: NORMAL NG/ML
NICOTINE: NORMAL NG/ML

## 2017-12-07 ENCOUNTER — OFFICE VISIT (OUTPATIENT)
Dept: FAMILY MEDICINE | Facility: CLINIC | Age: 62
End: 2017-12-07

## 2017-12-07 VITALS
WEIGHT: 193 LBS | OXYGEN SATURATION: 97 % | BODY MASS INDEX: 28.5 KG/M2 | SYSTOLIC BLOOD PRESSURE: 118 MMHG | HEART RATE: 67 BPM | DIASTOLIC BLOOD PRESSURE: 82 MMHG

## 2017-12-07 DIAGNOSIS — Z76.0 ENCOUNTER FOR MEDICATION REFILL: Primary | ICD-10-CM

## 2017-12-07 DIAGNOSIS — G47.9 SLEEP DISTURBANCE: ICD-10-CM

## 2017-12-07 DIAGNOSIS — L40.9 PSORIASIS: Primary | ICD-10-CM

## 2017-12-07 PROCEDURE — 99213 OFFICE O/P EST LOW 20 MIN: CPT | Performed by: FAMILY MEDICINE

## 2017-12-07 RX ORDER — NYSTATIN AND TRIAMCINOLONE ACETONIDE 100000; 1 [USP'U]/G; MG/G
OINTMENT TOPICAL
Qty: 30 G | Refills: 1 | Status: SHIPPED | OUTPATIENT
Start: 2017-12-07 | End: 2020-08-25

## 2017-12-07 NOTE — PROGRESS NOTES
Problem(s) Oriented visit        SUBJECTIVE:                                                    Abdiaziz Kimball is a 62 year old male who presents to clinic today for the following health issues :    He needs some forms filled out.  He is doing ok after the death of his wife but feels lost at times  We had a nice discussion about his family.    1. Psoriasis  stable3    2. Sleep disturbance  Tough at times         Problem list, Medication list, Allergies, and Medical/Social/Surgical histories reviewed in [facility] and updated as appropriate.   Additional history: as documented    ROS:  General and Resp. completed and negative except as noted above    Histories:   Patient Active Problem List   Diagnosis     HTN (hypertension)     Hypokalemia     Foot pain     Advanced directives, counseling/discussion     Health Care Home     Dry skin     Varicose veins of both legs with edema     Past Surgical History:   Procedure Laterality Date     ARTHROSCOPY KNEE RT/LT Left 1990's     ARTHROSCOPY KNEE RT/LT Left      ARTHROSCOPY KNEE RT/LT Left     Leon, Mark     ARTHROSCOPY KNEE RT/LT Left 2003ish    Mark Quintero     ARTHROSCOPY KNEE RT/LT Right 2005    Mark Quintero     HERNIA REPAIR, INGUINAL RT/LT Right 2004    Incarerated hernia with resection, Dr. Moraes     VASECTOMY  1988       Social History   Substance Use Topics     Smoking status: Never Smoker     Smokeless tobacco: Never Used     Alcohol use 0.0 oz/week     0 Standard drinks or equivalent per week      Comment: ocassional     No family history on file.        OBJECTIVE:                                                    /82  Pulse 67  Wt 87.5 kg (193 lb)  SpO2 97%  BMI 28.5 kg/m2  Body mass index is 28.5 kg/(m^2).   APPEARANCE: = Relaxed and in no distress  Conj/Eyelids = noninjected and lids and lashes are without inflammation  PERRLA/Irises = Pupils Round Reactive to Light and Irisis without inflammation  Neck = No anterior or posterior  adenopathy appreciated.  Thyroid = Not enlarged and no masses felt  Resp effort = Calm regular breathing  Breath Sounds = Good air movement with no rales or rhonchi in any lung fields  Heart Rate, Rythym, & sounds (no Murm)  = Regular rate and rythym with no S3, S4, or murmer appreciated.  Carotid Art's = Pulses full and equal and no bruits appreciated  Abdomen = Soft, nontender, no masses, & bowel sounds in all quadrants  Liver/Spleen = Normal span and no splenomegaly noted  Digits and Nails = FROM in all finger joints, no nail dystrophy  Ext (edema) = No pretibial edema noted or elsewhere  Musculsktl =  Strength and ROM of major joints are within normal limits  SKIN = absent significant rashes or lesions   Recent/Remote Memory = Alert and Oriented x 3  Mood/Affect = Cooperative and interested     ASSESSMENT/PLAN:                                                        Abdiaziz was seen today for forms.    Diagnoses and all orders for this visit:    Psoriasis    Sleep disturbance    >15 min spent with patient, greater than 50% spent on discussion/education/planning, etc about The primary encounter diagnosis was Psoriasis. A diagnosis of Sleep disturbance was also pertinent to this visit.       See Patient Instructions    The following health maintenance items are reviewed in Epic and correct as of today:  Health Maintenance   Topic Date Due     INFLUENZA VACCINE (SYSTEM ASSIGNED)  09/01/2017     ADVANCE DIRECTIVE PLANNING Q5 YRS  11/06/2017     COLON CANCER SCREEN (SYSTEM ASSIGNED)  01/06/2021     LIPID SCREEN Q5 YR MALE (SYSTEM ASSIGNED)  11/28/2022     TETANUS IMMUNIZATION (SYSTEM ASSIGNED)  12/31/2025     HEPATITIS C SCREENING  Completed       El Wray MD  [facility]  [phone]    For any issues my office # is [phone]

## 2017-12-07 NOTE — MR AVS SNAPSHOT
"              After Visit Summary   12/7/2017    Abdiaziz Kimball    MRN: [id number]           Patient Information     Date Of Birth          1955        Visit Information        Provider Department      12/7/2017 2:00 PM El Wray MD [facility]        Today's Diagnoses     Psoriasis    -  1    Sleep disturbance          Care Instructions    Thanks for coming in to see me today!    Your next visit with us should be scheduled for Follow up in 4 months    Listed next are the medical health Maintenance items we are tracking for your care and when they are next due (or overdue!)  Our goal is 100% \"up to date.\" Keep this list handy to call and schedule what you are due for in the future!    Health Maintenance Due   Topic Date Due     INFLUENZA VACCINE (SYSTEM ASSIGNED)  09/01/2017     ADVANCE DIRECTIVE PLANNING Q5 YRS  11/06/2017                 Follow-ups after your visit        Who to contact     If you have questions or need follow up information about today's clinic visit or your schedule please contact [facility] directly at [phone].  Normal or non-critical lab and imaging results will be communicated to you by TrueFacethart, letter or phone within 4 business days after the clinic has received the results. If you do not hear from us within 7 days, please contact the clinic through LibertadCard or phone. If you have a critical or abnormal lab result, we will notify you by phone as soon as possible.  Submit refill requests through LibertadCard or call your pharmacy and they will forward the refill request to us. Please allow 3 business days for your refill to be completed.          Additional Information About Your Visit        TrueFacethart Information     LibertadCard gives you secure access to your electronic health record. If you see a primary care provider, you can also send messages to your care team and make appointments. If you have questions, please call your primary care clinic.  If you " do not have a primary care provider, please call [phone] and they will assist you.        Care EveryWhere ID     This is your Care EveryWhere ID. This could be used by other organizations to access your [place] medical records  [id number]        Your Vitals Were     Pulse Pulse Oximetry BMI (Body Mass Index)             67 97% 28.5 kg/m2          Blood Pressure from Last 3 Encounters:   12/07/17 118/82   07/24/17 130/78   06/19/17 132/78    Weight from Last 3 Encounters:   12/07/17 87.5 kg (193 lb)   07/24/17 93 kg (205 lb)   06/19/17 93.9 kg (207 lb)              Today, you had the following     No orders found for display         Today's Medication Changes          These changes are accurate as of: 12/7/17 11:59 PM.  If you have any questions, ask your nurse or doctor.               Start taking these medicines.        Dose/Directions    nystatin-triamcinolone ointment   Commonly known as:  MYCOLOG   Used for:  Encounter for medication refill   Started by:  El Wray MD        APPLY TO SCALP 2 TIMES DAILY   Quantity:  30 g   Refills:  1            Where to get your medicines      These medications were sent to [facility][id number] - [place], [place] - [address]   [address] [facility] [address]     Phone:  [phone]     nystatin-triamcinolone ointment                Primary Care Provider Office Phone # Fax #    El Wray [phone] [address] [facility] [address]        Equal Access to Services     SATNAM TAVAREZ AH: Hadii escobar ku hadasho [place], waaxda luqadaha, qaybta kaalmada adeegyada, leola idiin hayaan adegeneva elizabeth. So [facility] [phone].    ATENCIÓN: Si habla español, tiene a morales disposición servicios gratuitos de asistencia lingüística. Llame al [phone].    We comply with applicable federal civil rights laws and [place] laws. We do not discriminate on the basis of race, color, national origin, age, disability, sex, sexual  orientation, or gender identity.            Thank you!     Thank you for choosing [facility]  for your care. Our goal is always to provide you with excellent care. Hearing back from our patients is one way we can continue to improve our services. Please take a few minutes to complete the written survey that you may receive in the mail after your visit with us. Thank you!             Your Updated Medication List - Protect others around you: Learn how to safely use, store and throw away your medicines at www.disposemymeds.org.          This list is accurate as of: 12/7/17 11:59 PM.  Always use your most recent med list.                   Brand Name Dispense Instructions for use Diagnosis    amLODIPine 2.5 MG tablet    NORVASC    180 tablet    TAKE ONE TABLET BY MOUTH TWO TIMES A DAY    Essential hypertension       coenzyme Q-10 200 MG Caps      Take  by mouth daily.        Fish Oil 1000 MG Cpdr      Take  by mouth. Takes 2000mg        GLUCOSAMINE COMPLEX PO      Take 300 mg by mouth.        hydrochlorothiazide 12.5 MG capsule    MICROZIDE    90 capsule    Take 1 capsule (12.5 mg) by mouth daily        naproxen 500 MG tablet    NAPROSYN    60 tablet    TAKE ONE TABLET BY MOUTH TWICE DAILY WITH MEALS    Encounter for medication refill       nystatin-triamcinolone ointment    MYCOLOG    30 g    APPLY TO SCALP 2 TIMES DAILY    Encounter for medication refill       * potassium chloride 10 MEQ tablet    K-TAB,KLOR-CON    180 tablet    TAKE ONE TABLET BY MOUTH TWICE DAILY    Encounter for medication refill       * potassium chloride 10 MEQ tablet    K-TAB,KLOR-CON    60 tablet    TAKE ONE TABLET BY MOUTH TWICE DAILY    Encounter for medication refill       * potassium chloride 10 MEQ tablet    K-TAB,KLOR-CON    60 tablet    TAKE ONE TABLET BY MOUTH TWICE DAILY    Encounter for medication refill, Essential hypertension       QC MULTI-DIMA 50 & OVER PO      Take  by mouth daily.        vitamin B complex with  vitamin C Tabs tablet      Take 1 tablet by mouth daily        * Notice:  This list has 3 medication(s) that are the same as other medications prescribed for you. Read the directions carefully, and ask your doctor or other care provider to review them with you.

## 2017-12-11 NOTE — PATIENT INSTRUCTIONS
"Thanks for coming in to see me today!    Your next visit with us should be scheduled for Follow up in 4 months    Listed next are the medical health Maintenance items we are tracking for your care and when they are next due (or overdue!)  Our goal is 100% \"up to date.\" Keep this list handy to call and schedule what you are due for in the future!    Health Maintenance Due   Topic Date Due     INFLUENZA VACCINE (SYSTEM ASSIGNED)  09/01/2017     ADVANCE DIRECTIVE PLANNING Q5 YRS  11/06/2017         "

## 2017-12-12 NOTE — PROGRESS NOTES
Order(s) created erroneously. Erroneous order ID: [id number]   Order canceled by: DIOMEDES WAGNER   Order cancel date/time: 12/12/2017 1:45 PM

## 2018-01-08 DIAGNOSIS — I10 ESSENTIAL HYPERTENSION: ICD-10-CM

## 2018-01-08 RX ORDER — HYDROCHLOROTHIAZIDE 12.5 MG/1
CAPSULE ORAL
Qty: 30 CAPSULE | Refills: 6 | Status: SHIPPED | OUTPATIENT
Start: 2018-01-08 | End: 2018-03-05

## 2018-01-08 RX ORDER — AMLODIPINE BESYLATE 2.5 MG/1
TABLET ORAL
Qty: 60 TABLET | Refills: 2 | Status: SHIPPED | OUTPATIENT
Start: 2018-01-08 | End: 2018-03-05

## 2018-01-30 DIAGNOSIS — Z79.899 ENCOUNTER FOR LONG-TERM (CURRENT) USE OF HIGH-RISK MEDICATION: Primary | ICD-10-CM

## 2018-01-31 PROBLEM — Z79.899 ENCOUNTER FOR LONG-TERM (CURRENT) USE OF HIGH-RISK MEDICATION: Status: ACTIVE | Noted: 2018-01-31

## 2018-01-31 RX ORDER — NAPROXEN 500 MG/1
TABLET ORAL
Qty: 60 TABLET | Refills: 3 | Status: SHIPPED | OUTPATIENT
Start: 2018-01-31 | End: 2018-05-27

## 2018-01-31 NOTE — TELEPHONE ENCOUNTER
naproxen (NAPROSYN) 500 MG    LAST  Med check 12/7/17   last labs(for RX) 11/28/17  Next  appt scheduled =  none  Angelique Camilo MA

## 2018-02-25 DIAGNOSIS — I10 ESSENTIAL HYPERTENSION: ICD-10-CM

## 2018-02-25 DIAGNOSIS — Z76.0 ENCOUNTER FOR MEDICATION REFILL: ICD-10-CM

## 2018-02-25 RX ORDER — POTASSIUM CHLORIDE 750 MG/1
TABLET, EXTENDED RELEASE ORAL
Qty: 60 TABLET | Refills: 0 | Status: SHIPPED | OUTPATIENT
Start: 2018-02-25 | End: 2018-03-02

## 2018-03-02 DIAGNOSIS — I10 ESSENTIAL HYPERTENSION: ICD-10-CM

## 2018-03-02 DIAGNOSIS — Z79.899 ENCOUNTER FOR LONG-TERM (CURRENT) USE OF MEDICATIONS: Primary | ICD-10-CM

## 2018-03-02 NOTE — TELEPHONE ENCOUNTER
potassium chloride (K-TAB,KLOR-CON) 10 MEQ tablet   LAST  Med check 7/24/17   last labs(for RX) 11/28/17  Next  appt scheduled =  3/5/18 med ck  Angelique Camilo MA    BP Readings from Last 3 Encounters:   12/07/17 118/82   07/24/17 130/78   06/19/17 132/78     Last Basic Metabolic Panel:  Lab Results   Component Value Date     11/28/2017      Lab Results   Component Value Date    POTASSIUM 3.3 11/28/2017     Lab Results   Component Value Date    CHLORIDE 101 11/28/2017     Lab Results   Component Value Date    TARAN 8.7 11/28/2017     No results found for: CO2  Lab Results   Component Value Date    BUN 23 11/28/2017    BUN 29 11/28/2017     Lab Results   Component Value Date    CR 0.79 11/28/2017     Lab Results   Component Value Date    GLC 87 11/28/2017

## 2018-03-04 RX ORDER — POTASSIUM CHLORIDE 750 MG/1
TABLET, EXTENDED RELEASE ORAL
Qty: 60 TABLET | Refills: 1 | Status: SHIPPED | OUTPATIENT
Start: 2018-03-04 | End: 2018-05-27

## 2018-03-05 ENCOUNTER — OFFICE VISIT (OUTPATIENT)
Dept: FAMILY MEDICINE | Facility: CLINIC | Age: 63
End: 2018-03-05

## 2018-03-05 VITALS
RESPIRATION RATE: 12 BRPM | OXYGEN SATURATION: 98 % | DIASTOLIC BLOOD PRESSURE: 72 MMHG | BODY MASS INDEX: 27.14 KG/M2 | SYSTOLIC BLOOD PRESSURE: 108 MMHG | HEIGHT: 70 IN | HEART RATE: 62 BPM | WEIGHT: 189.6 LBS

## 2018-03-05 DIAGNOSIS — I10 ESSENTIAL HYPERTENSION: ICD-10-CM

## 2018-03-05 DIAGNOSIS — N40.1 BENIGN PROSTATIC HYPERPLASIA WITH NOCTURIA: ICD-10-CM

## 2018-03-05 DIAGNOSIS — M79.671 PAIN IN BOTH FEET: ICD-10-CM

## 2018-03-05 DIAGNOSIS — Z13.220 LIPID SCREENING: ICD-10-CM

## 2018-03-05 DIAGNOSIS — Z13.6 SCREENING FOR CARDIOVASCULAR CONDITION: ICD-10-CM

## 2018-03-05 DIAGNOSIS — E87.6 HYPOKALEMIA: Primary | ICD-10-CM

## 2018-03-05 DIAGNOSIS — R35.1 BENIGN PROSTATIC HYPERPLASIA WITH NOCTURIA: ICD-10-CM

## 2018-03-05 DIAGNOSIS — M79.672 PAIN IN BOTH FEET: ICD-10-CM

## 2018-03-05 PROCEDURE — 36415 COLL VENOUS BLD VENIPUNCTURE: CPT | Performed by: FAMILY MEDICINE

## 2018-03-05 PROCEDURE — 99213 OFFICE O/P EST LOW 20 MIN: CPT | Performed by: FAMILY MEDICINE

## 2018-03-05 RX ORDER — AMLODIPINE BESYLATE 2.5 MG/1
2.5 TABLET ORAL 2 TIMES DAILY
Qty: 60 TABLET | Refills: 3 | Status: SHIPPED | OUTPATIENT
Start: 2018-04-20 | End: 2018-07-24

## 2018-03-05 RX ORDER — HYDROCHLOROTHIAZIDE 12.5 MG/1
1 CAPSULE ORAL DAILY
Qty: 30 CAPSULE | Refills: 6 | Status: SHIPPED | OUTPATIENT
Start: 2018-04-20 | End: 2019-01-29

## 2018-03-05 NOTE — MR AVS SNAPSHOT
"              After Visit Summary   3/5/2018    Abdiaziz Kimball    MRN: [id number]           Patient Information     Date Of Birth          1955        Visit Information        Provider Department      3/5/2018 10:15 AM El Wray MD [facility]        Today's Diagnoses     Hypokalemia    -  1    Essential hypertension        Lipid screening        Pain in both feet        Screening for cardiovascular condition        Benign prostatic hyperplasia with nocturia           Follow-ups after your visit        Who to contact     If you have questions or need follow up information about today's clinic visit or your schedule please contact [facility] directly at [phone].  Normal or non-critical lab and imaging results will be communicated to you by MyChart, letter or phone within 4 business days after the clinic has received the results. If you do not hear from us within 7 days, please contact the clinic through Gratat or phone. If you have a critical or abnormal lab result, we will notify you by phone as soon as possible.  Submit refill requests through Peak 10 or call your pharmacy and they will forward the refill request to us. Please allow 3 business days for your refill to be completed.          Additional Information About Your Visit        MyChart Information     Peak 10 gives you secure access to your electronic health record. If you see a primary care provider, you can also send messages to your care team and make appointments. If you have questions, please call your primary care clinic.  If you do not have a primary care provider, please call [phone] and they will assist you.        Care EveryWhere ID     This is your Care EveryWhere ID. This could be used by other organizations to access your [place] medical records  [id number]        Your Vitals Were     Pulse Respirations Height Pulse Oximetry BMI (Body Mass Index)       62 12 1.778 m (5' 10\") 98% 27.2 " kg/m2        Blood Pressure from Last 3 Encounters:   03/05/18 108/72   12/07/17 118/82   07/24/17 130/78    Weight from Last 3 Encounters:   03/05/18 86 kg (189 lb 9.6 oz)   12/07/17 87.5 kg (193 lb)   07/24/17 93 kg (205 lb)              We Performed the Following     Basic Metabolic Panel (8) ([facility])     Hemoglobin A1C ([facility])     Lipid Panel ([facility])     Nicotine and Metabolite Quant ([facility])          Today's Medication Changes          These changes are accurate as of 3/5/18 11:03 AM.  If you have any questions, ask your nurse or doctor.               These medicines have changed or have updated prescriptions.        Dose/Directions    amLODIPine 2.5 MG tablet   Commonly known as:  NORVASC   This may have changed:  See the new instructions.   Used for:  Essential hypertension   Changed by:  El Wray MD        Dose:  2.5 mg   Start taking on:  4/20/2018   Take 1 tablet (2.5 mg) by mouth 2 times daily   Quantity:  60 tablet   Refills:  3       hydrochlorothiazide 12.5 MG capsule   Commonly known as:  MICROZIDE   This may have changed:  See the new instructions.   Used for:  Essential hypertension   Changed by:  El Wray MD        Dose:  1 capsule   Start taking on:  4/20/2018   Take 1 capsule (12.5 mg) by mouth daily   Quantity:  30 capsule   Refills:  6            Where to get your medicines      These medications were sent to [id number] - [facility] [address]  [address] [facility] [address]     Phone:  [phone]     amLODIPine 2.5 MG tablet    hydrochlorothiazide 12.5 MG capsule                Primary Care Provider Office Phone # Fax #    El Wray [phone] [address]  [facility] [address]        Equal Access to Services     SATNAM TAVAREZ AH: Riki sanderso [place], waaxda luqadaha, qaybta kaalmada adeegyada, leola elizabeth. So [facility] [phone].    ATENCIÓN: Si  roverto galicia, tiene a morales disposición servicios gratuitos de asistencia lingüística. Unique munoz [phone].    We comply with applicable federal civil rights laws and [place] laws. We do not discriminate on the basis of race, color, national origin, age, disability, sex, sexual orientation, or gender identity.            Thank you!     Thank you for choosing [facility]  for your care. Our goal is always to provide you with excellent care. Hearing back from our patients is one way we can continue to improve our services. Please take a few minutes to complete the written survey that you may receive in the mail after your visit with us. Thank you!             Your Updated Medication List - Protect others around you: Learn how to safely use, store and throw away your medicines at www.disposemymeds.org.          This list is accurate as of 3/5/18 11:03 AM.  Always use your most recent med list.                   Brand Name Dispense Instructions for use Diagnosis    amLODIPine 2.5 MG tablet   Start taking on:  4/20/2018    NORVASC    60 tablet    Take 1 tablet (2.5 mg) by mouth 2 times daily    Essential hypertension       coenzyme Q-10 200 MG Caps      Take  by mouth daily.        Fish Oil 1000 MG Cpdr      Take  by mouth. Takes 2000mg        GLUCOSAMINE COMPLEX PO      Take 300 mg by mouth.        hydrochlorothiazide 12.5 MG capsule   Start taking on:  4/20/2018    MICROZIDE    30 capsule    Take 1 capsule (12.5 mg) by mouth daily    Essential hypertension       naproxen 500 MG tablet    NAPROSYN    60 tablet    TAKE ONE TABLET BY MOUTH TWICE DAILY WITH MEALS    Encounter for long-term (current) use of high-risk medication       nystatin-triamcinolone ointment    MYCOLOG    30 g    APPLY TO SCALP 2 TIMES DAILY    Encounter for medication refill       potassium chloride 10 MEQ tablet    K-TAB,KLOR-CON    60 tablet    TAKE ONE TABLET BY MOUTH TWICE DAILY    Essential hypertension, Encounter for long-term  (current) use of medications       QC MULTI-DIMA 50 & OVER PO      Take  by mouth daily.        vitamin B complex with vitamin C Tabs tablet      Take 1 tablet by mouth daily

## 2018-03-05 NOTE — PROGRESS NOTES
Tingling in the feet and hands or after exercise   Some related to carpal tunnel in the hands.    Feet are worse when sleeps.  Reading on the internet and no problems if fallen    Blood presure remains well controlled when checked out of clinic.    Reviewed last 6 BP readings in chart:  BP Readings from Last 6 Encounters:   03/05/18 108/72   12/07/17 118/82   07/24/17 130/78   06/19/17 132/78   06/13/17 (!) 152/96   11/28/16 132/84       he has not experienced any significant side effects from medications for hypertension.    NO active cardiac complaints or symptoms with exercise.    Problem(s) Oriented visit      ROS:  General and CV completed and negative except as noted above     HISTORY:   reports that he drinks alcohol.   reports that he has never smoked. He has never used smokeless tobacco.    Past Medical History:   Diagnosis Date     Dry skin 12/4/2014     Foot pain 11/6/2012     HTN (hypertension)      HTN, goal below 140/90      Hyperlipidaemia LDL goal < 100      Hypokalemia 11/17/2011     Past Surgical History:   Procedure Laterality Date     ARTHROSCOPY KNEE RT/LT Left 1990's     ARTHROSCOPY KNEE RT/LT Left      ARTHROSCOPY KNEE RT/LT Left     [facility]     ARTHROSCOPY KNEE RT/LT Left 2003ish    Mark Quintero     ARTHROSCOPY KNEE RT/LT Right 2005    Mark Quintero     HERNIA REPAIR, INGUINAL RT/LT Right 2004    Incarerated hernia with resection, Dr. Moraes     VASECTOMY  1988       EXAM:  BP: 108/72   Pulse: 62    Temp: Data Unavailable    Wt Readings from Last 2 Encounters:   03/05/18 86 kg (189 lb 9.6 oz)   12/07/17 87.5 kg (193 lb)       BMI= Body mass index is 27.2 kg/(m^2).    EXAM:  APPEARANCE: = Relaxed and in no distress  Conj/Eyelids = noninjected and lids and lashes are without inflammation  PERRLA/Irises = Pupils Round Reactive to Light and Irisis without inflammation  Neck = No anterior or posterior adenopathy appreciated.  Thyroid = Not enlarged and no masses felt  Resp effort =  Calm regular breathing  Breath Sounds = Good air movement with no rales or rhonchi in any lung fields  Heart Rate, Rythym, & sounds (no Murm)  = Regular rate and rythym with no S3, S4, or murmer appreciated.  Carotid Art's = Pulses full and equal and no bruits appreciated  Abdomen = Soft, nontender, no masses, & bowel sounds in all quadrants  Liver/Spleen = Normal span and no splenomegaly noted  Digits and Nails = FROM in all finger joints, no nail dystrophy  Ext (edema) = No pretibial edema noted or elsewhere  Musculsktl =  Strength and ROM of major joints are within normal limits  SKIN = absent significant rashes or lesions   Recent/Remote Memory = Alert and Oriented x 3  Mood/Affect = Cooperative and interested      Assessment/Plan:  Abdiaziz was seen today for forms, hypertension and recheck medication.    Diagnoses and all orders for this visit:    Hypokalemia    Essential hypertension  -     amLODIPine (NORVASC) 2.5 MG tablet; Take 1 tablet (2.5 mg) by mouth 2 times daily  -     hydrochlorothiazide (MICROZIDE) 12.5 MG capsule; Take 1 capsule (12.5 mg) by mouth daily  -     Basic Metabolic Panel (8) ([facility])  Discussed hypertension in detail including JNC and American Heart Association guidelines for blood pressure goals.  Discussed indication for treatment and treatment options.  Discussed the importance for aggressive management of HTN to prevent vascular complications later.  Recommended lower fat, lower carbohydrate, and lower sodium (<2000 mg)diet.  Discussed required intervals for follow up on HTN, lab studies, and the need to aggresive management of other cardiac disease risk factors.  Recommened pt. follow their blood pressures outside the clinic to ensure that BPs are remaining within guidelines, and to contact me if the readings are not within guidelines so we can adjust treatment as needed.    Lipid screening    Pain in both feet  Long discussion about potential peripheral neuropathy    BPH  These  "symptoms sound most consistent with bladder irritation from an enlarged prostate (BPH).  Will make sure the PSA is normal and up to date.    Discussed treatment options for this including lifestyle changes ( i.e. restricting water intake close to bedtime or long trips, avoiding caffeine, etc.), medications (including FLOMAX, proscar, saw palmetto, etc), and referral to Urology.    Screening for cardiovascular condition  -     Lipid Panel ([facility])  -     Hemoglobin A1C ([facility])  -     Nicotine and Metabolite Quant ([facility])        COUNSELING:   reports that he has never smoked. He has never used smokeless tobacco.    Estimated body mass index is 27.2 kg/(m^2) as calculated from the following:    Height as of this encounter: 1.778 m (5' 10\").    Weight as of this encounter: 86 kg (189 lb 9.6 oz).       Appropriate preventive services were discussed with this patient, including applicable screening as appropriate for cardiovascular disease, diabetes, osteopenia/osteoporosis, and glaucoma.  As appropriate for age/gender, discussed screening for colorectal cancer, prostate cancer, breast cancer, and cervical cancer. Checklist reviewing preventive services available has been given to the patient.    Reviewed patients plan of care and provided an AVS. The Basic Care Plan (routine screening as documented in Health Maintenance) for Abdiaziz meets the Care Plan requirement. This Care Plan has been established and reviewed with the  Patient.      The following health maintenance items are reviewed in Epic and correct as of today:  Health Maintenance   Topic Date Due     ADVANCE DIRECTIVE PLANNING Q5 YRS  11/06/2017     INFLUENZA VACCINE (SYSTEM ASSIGNED)  09/01/2018     COLON CANCER SCREEN (SYSTEM ASSIGNED)  01/06/2021     LIPID SCREEN Q5 YR MALE (SYSTEM ASSIGNED)  11/28/2022     TETANUS IMMUNIZATION (SYSTEM ASSIGNED)  12/31/2025     HEPATITIS C SCREENING  Completed       El Wray  [facility]  For any " issues my office # is [phone]

## 2018-03-06 LAB
BUN SERPL-MCNC: 24 MG/DL (ref 8–27)
BUN/CREATININE RATIO: 30 (ref 10–24)
CALCIUM SERPL-MCNC: 8.9 MG/DL (ref 8.6–10.2)
CHLORIDE SERPLBLD-SCNC: 103 MMOL/L (ref 96–106)
CHOLEST SERPL-MCNC: 159 MG/DL (ref 100–199)
CREAT SERPL-MCNC: 0.79 MG/DL (ref 0.76–1.27)
EGFR IF AFRICN AM: 110 ML/MIN/1.73
EGFR IF NONAFRICN AM: 95 ML/MIN/1.73
GLUCOSE SERPL-MCNC: 96 MG/DL (ref 65–99)
HBA1C MFR BLD: 5.2 % (ref 4.8–5.6)
HDLC SERPL-MCNC: 40 MG/DL
LDL/HDL RATIO: 2.3 RATIO UNITS (ref 0–3.6)
LDLC SERPL CALC-MCNC: 93 MG/DL (ref 0–99)
POTASSIUM SERPL-SCNC: 3.9 MMOL/L (ref 3.5–5.2)
SODIUM SERPL-SCNC: 145 MMOL/L (ref 134–144)
TOTAL CO2: 28 MMOL/L (ref 18–28)
TRIGL SERPL-MCNC: 128 MG/DL (ref 0–149)
VLDLC SERPL CALC-MCNC: 26 MG/DL (ref 5–40)

## 2018-03-09 LAB
COTININE: NORMAL NG/ML
NICOTINE: NORMAL NG/ML

## 2018-03-09 NOTE — PROGRESS NOTES
Form from Vitality Check - Cotinine Screening  Readied for  for patient today  LM for patient to  as requested with copies of labs results( ok on form to use copies of results ) vs signature of MD- Dr Wray is out of town  Angelique Camilo MA

## 2018-05-11 ENCOUNTER — TRANSFERRED RECORDS (OUTPATIENT)
Dept: FAMILY MEDICINE | Facility: CLINIC | Age: 63
End: 2018-05-11

## 2018-05-14 ENCOUNTER — OFFICE VISIT (OUTPATIENT)
Dept: FAMILY MEDICINE | Facility: CLINIC | Age: 63
End: 2018-05-14

## 2018-05-14 VITALS
BODY MASS INDEX: 27.66 KG/M2 | RESPIRATION RATE: 12 BRPM | WEIGHT: 192.8 LBS | HEART RATE: 60 BPM | DIASTOLIC BLOOD PRESSURE: 78 MMHG | SYSTOLIC BLOOD PRESSURE: 122 MMHG

## 2018-05-14 DIAGNOSIS — G89.29 CHRONIC GLUTEAL PAIN: ICD-10-CM

## 2018-05-14 DIAGNOSIS — S99.922A TOE TRAUMA, LEFT, INITIAL ENCOUNTER: Primary | ICD-10-CM

## 2018-05-14 DIAGNOSIS — M79.18 CHRONIC GLUTEAL PAIN: ICD-10-CM

## 2018-05-14 DIAGNOSIS — E87.6 LOW SERUM POTASSIUM: ICD-10-CM

## 2018-05-14 PROCEDURE — 99213 OFFICE O/P EST LOW 20 MIN: CPT | Performed by: FAMILY MEDICINE

## 2018-05-14 PROCEDURE — 72100 X-RAY EXAM L-S SPINE 2/3 VWS: CPT | Performed by: FAMILY MEDICINE

## 2018-05-14 ASSESSMENT — PAIN SCALES - GENERAL: PAINLEVEL: MODERATE PAIN (5)

## 2018-05-14 NOTE — MR AVS SNAPSHOT
After Visit Summary   5/14/2018    Abdiaziz Kimball    MRN: [id number]           Patient Information     Date Of Birth          1955        Visit Information        Provider Department      5/14/2018 1:45 PM Angelica Becerra MD [facility]        Today's Diagnoses     Toe trauma, left, initial encounter    -  1    Chronic gluteal pain          Care Instructions    If no relief w/ chiropractic, we can consider PT and or advanced imaging of your lumbar spine/hamstrings.       We could also consider a course of oral steroids at your convenience.             Follow-ups after your visit        Future tests that were ordered for you today     Open Future Orders        Priority Expected Expires Ordered    XR Toe Left G/E 2 Views Routine 5/14/2018 5/14/2019 5/14/2018            Who to contact     If you have questions or need follow up information about today's clinic visit or your schedule please contact [facility] directly at [phone].  Normal or non-critical lab and imaging results will be communicated to you by [employer], letter or phone within 4 business days after the clinic has received the results. If you do not hear from us within 7 days, please contact the clinic through [facility] or phone. If you have a critical or abnormal lab result, we will notify you by phone as soon as possible.  Submit refill requests through [employer] or call your pharmacy and they will forward the refill request to us. Please allow 3 business days for your refill to be completed.          Additional Information About Your Visit        [employer] Information     [employer] gives you secure access to your electronic health record. If you see a primary care provider, you can also send messages to your care team and make appointments. If you have questions, please call your primary care clinic.  If you do not have a primary care provider, please call [phone] and they will assist you.         Care EveryWhere ID     This is your Care EveryWhere ID. This could be used by other organizations to access your [facility] medical records  [id number]        Your Vitals Were     Pulse Respirations BMI (Body Mass Index)             60 12 27.66 kg/m2          Blood Pressure from Last 3 Encounters:   05/14/18 122/78   03/05/18 108/72   12/07/17 118/82    Weight from Last 3 Encounters:   05/14/18 87.5 kg (192 lb 12.8 oz)   03/05/18 86 kg (189 lb 9.6 oz)   12/07/17 87.5 kg (193 lb)              We Performed the Following     X-ray lumbar spine 2-3 views*        Primary Care Provider Office Phone # Fax #    El Wray [phone] [id number]  [facility] [address]        Equal Access to Services     [facility]: Hadii aad ku hadasho Soomaali, waaxda luqadaha, qaybta kaalmada adeegyada, waxay idiin hayjody sears . So [facility] [phone].    ATENCIÓN: Si habla español, tiene a morales disposición servicios gratuitos de asistencia lingüística. Llame al [phone].    We comply with applicable federal civil rights laws and [place] laws. We do not discriminate on the basis of race, color, national origin, age, disability, sex, sexual orientation, or gender identity.            Thank you!     Thank you for choosing [facility]  for your care. Our goal is always to provide you with excellent care. Hearing back from our patients is one way we can continue to improve our services. Please take a few minutes to complete the written survey that you may receive in the mail after your visit with us. Thank you!             Your Updated Medication List - Protect others around you: Learn how to safely use, store and throw away your medicines at www.disposemymeds.org.          This list is accurate as of 5/14/18  3:02 PM.  Always use your most recent med list.                   Brand Name Dispense Instructions for use Diagnosis    amLODIPine 2.5 MG tablet    NORVASC     60 tablet    Take 1 tablet (2.5 mg) by mouth 2 times daily    Essential hypertension       coenzyme Q-10 200 MG Caps      Take  by mouth daily.        Fish Oil 1000 MG Cpdr      Take  by mouth. Takes 2000mg        GLUCOSAMINE COMPLEX PO      Take 300 mg by mouth.        hydrochlorothiazide 12.5 MG capsule    MICROZIDE    30 capsule    Take 1 capsule (12.5 mg) by mouth daily    Essential hypertension       naproxen 500 MG tablet    NAPROSYN    60 tablet    TAKE ONE TABLET BY MOUTH TWICE DAILY WITH MEALS    Encounter for long-term (current) use of high-risk medication       nystatin-triamcinolone ointment    MYCOLOG    30 g    APPLY TO SCALP 2 TIMES DAILY    Encounter for medication refill       potassium chloride 10 MEQ tablet    K-TAB,KLOR-CON    60 tablet    TAKE ONE TABLET BY MOUTH TWICE DAILY    Essential hypertension, Encounter for long-term (current) use of medications       QC MULTI-DIMA 50 & OVER PO      Take  by mouth daily.        vitamin B complex with vitamin C Tabs tablet      Take 1 tablet by mouth daily

## 2018-05-14 NOTE — PROGRESS NOTES
Problem(s) Oriented visit      SUBJECTIVE:                                                    Abdiaziz Kimball is a 62 year old male who presents to clinic today for R gluteal pain for 10 yrs - getting worse. Not carried wallet in back pocket for 3-4 years. Low back has also been uncomfortable for the last 1-2 years. Pain is in R glut - right where carries wallet  Pain went away, returned but deep inside buttock.   Over the last year, has gotten worse.   Feels better to lean to the Left. Sitting on it makes it worse. Better when walking. Standing up is better. Does not feel it when walking. If sit evenly full on it, is painful. If slid back and distribue wt not as bad. No N/t in legs.   Lumbar spine - threw back out - saw DC. Tx lasted for a while, but not permanent. Not helpful for glut pain.   Nothing in scrotum. No change in bowel or bladder habits. No difficulty; no incontinence.   Has varicose veins.   Has not taken anything for this. Is able to sleep OK.  No falls.     L great toe is painful - Stubbed L great toe. Thursday night/Friday am. Painful, edematous, erythematous w/ nail trauma. Not draining. Difficult getting a shoe on due to trauma at 1st IP joint. Has known marked pre-existing OA in 1st MTP joint.     Problem list, Medication list, Allergies, and Medical/Social/Surgical histories reviewed in EPIC and updated as appropriate.     ROS:  General and CV completed and negative except as noted above    Histories:   Patient Active Problem List   Diagnosis     HTN (hypertension)     Hypokalemia     Foot pain     Advanced directives, counseling/discussion     Health Care Home     Dry skin     Varicose veins of both legs with edema     Encounter for long-term (current) use of high-risk medication     Benign prostatic hyperplasia with nocturia     Past Medical History:   Diagnosis Date     Dry skin 12/4/2014     Foot pain 11/6/2012    OA of both feet      HTN (hypertension)     Renal artery US neg for stenosis  2008.     HTN, goal below 140/90      Hyperlipidaemia LDL goal < 100      Hypokalemia 11/17/2011     Renal cyst 01/25/2008    Small. Seen on renal US.     Past Surgical History:   Procedure Laterality Date     ARTHROSCOPY KNEE RT/LT Left 1990's     ARTHROSCOPY KNEE RT/LT Left      ARTHROSCOPY KNEE RT/LT Left     Mark Quintero     ARTHROSCOPY KNEE RT/LT Left 2003ish    Mark Quintero     ARTHROSCOPY KNEE RT/LT Right 2005    Leon Mark     HERNIA REPAIR, INGUINAL RT/LT Right 2004    Incarerated hernia with resection, Dr. Moraes     VASECTOMY  1988     Social History   Substance Use Topics     Smoking status: Never Smoker     Smokeless tobacco: Never Used     Alcohol use 0.0 - 0.6 oz/week     0 - 1 Cans of beer per week      Comment: ocassional     No family history on file.    OBJECTIVE:                                                    /78  Pulse 60  Resp 12  Wt 87.5 kg (192 lb 12.8 oz)  BMI 27.66 kg/m2  Body mass index is 27.66 kg/(m^2).  APPEARANCE: = Relaxed and in no distress  MS: L great toe edematous, nail is intact. No ankle edema.     ASSESSMENT/PLAN:                                                      Abdiaziz was seen today for musculoskeletal problem.    Diagnoses and all orders for this visit:    Toe trauma, left, initial encounter  -     XR Toe Left G/E 2 Views; Future     Chronic gluteal pain  -     X-ray lumbar spine 2-3 views    If no relief w/ chiropractic, we can consider PT and or advanced imaging of your lumbar spine/hamstrings. We could also consider a course of oral steroids at your convenience.     Regular exercise  >15 min spent with patient, greater than 50% spent on discussion/education/planning, etc about The primary encounter diagnosis was Toe trauma, left, initial encounter. Diagnoses of Chronic gluteal pain and Low serum potassium were also pertinent to this visit.     The following health maintenance items are reviewed in Epic and correct as of today:  Health  Maintenance   Topic Date Due     HIV SCREEN (SYSTEM ASSIGNED)  11/13/1971     ADVANCE DIRECTIVE PLANNING Q5 YRS  11/06/2017     INFLUENZA VACCINE (Season Ended) 09/01/2018     COLON CANCER SCREEN (SYSTEM ASSIGNED)  01/06/2021     LIPID SCREEN Q5 YR MALE (SYSTEM ASSIGNED)  03/05/2023     TETANUS IMMUNIZATION (SYSTEM ASSIGNED)  12/31/2025     HEPATITIS C SCREENING  Completed     Angelica Becerra MD  [facility]  Family Medicine  [phone]    For any issues, please call my office [phone].

## 2018-05-14 NOTE — LETTER
"[facility] [address] [place]  [address]  Phone: [phone]    May 16, 2018      Abdiaziz Kimball  [address] [facility] [address]              Dear Abdiaziz,    The radiologist did not think the xrays of your low back showed an obvious cause for your buttock pain.     If you do not get some pain relief from your chiropractor's treatments in a couple of weeks, we could consider a course of physical therapy or try a several days of oral steroids (decreases inflammation). We could also consider getting an MRI of your lumbar spine and/or hamstrings to further evaluate the source of your discomfort.     Please let us know how you are progressing. Certainly, if the pain gets worse, we would be happy to see you in clinic right away.         Sincerely,     Angelica \"Claudia\" MD [facility]    Results for orders placed or performed in visit on 03/05/18   Nicotine and Metabolite Quant ([facility])   Result Value Ref Range    Nicotine None Detected ng/mL    Cotinine None Detected ng/mL    Narrative    Performed at:  01 - [facility]  [id number]  : Cristopher Souza MD, Phone:  [phone]   Basic Metabolic Panel (8) ([facility])   Result Value Ref Range    Glucose 96 65 - 99 mg/dL    Urea Nitrogen 24 8 - 27 mg/dL    Creatinine 0.79 0.76 - 1.27 mg/dL    eGFR If NonAfricn Am 95 >59 mL/min/1.73    eGFR If Africn Am 110 >59 mL/min/1.73    BUN/Creatinine Ratio 30 (H) 10 - 24    Sodium 145 (H) 134 - 144 mmol/L    Potassium 3.9 3.5 - 5.2 mmol/L    Chloride 103 96 - 106 mmol/L    Total CO2 28 18 - 28 mmol/L    Calcium 8.9 8.6 - 10.2 mg/dL    Narrative    Performed at:  01 - [facility] [address], [place], [place]  [id number]  : Chava Dexter MD, Phone:  [phone]   Lipid Panel ([facility])   Result Value Ref Range    Cholesterol 159 100 - 199 mg/dL    Triglycerides 128 0 - 149 mg/dL    HDL Cholesterol 40 >39 mg/dL    VLDL Cholesterol Phi " 26 5 - 40 mg/dL    LDL Cholesterol Calculated 93 0 - 99 mg/dL    LDL/HDL Ratio 2.3 0.0 - 3.6 ratio units    Narrative    Performed at:  01 - [facility] [address], [place], [place]  [id number]  : Chava Dexter MD, Phone:  [phone]   Hemoglobin A1C ([facility])   Result Value Ref Range    Hemoglobin A1C 5.2 4.8 - 5.6 %    Narrative    Performed at:  01 - [facility] [address], [place], [place]  [id number]  : Chava Dexter MD, Phone:  [phone]

## 2018-05-14 NOTE — PATIENT INSTRUCTIONS
If no relief w/ chiropractic, we can consider PT and or advanced imaging of your lumbar spine/hamstrings.       We could also consider a course of oral steroids at your convenience.

## 2018-05-27 DIAGNOSIS — I10 ESSENTIAL HYPERTENSION: ICD-10-CM

## 2018-05-27 DIAGNOSIS — Z79.899 ENCOUNTER FOR LONG-TERM (CURRENT) USE OF HIGH-RISK MEDICATION: ICD-10-CM

## 2018-05-27 DIAGNOSIS — Z79.899 ENCOUNTER FOR LONG-TERM (CURRENT) USE OF MEDICATIONS: ICD-10-CM

## 2018-05-29 RX ORDER — POTASSIUM CHLORIDE 750 MG/1
TABLET, EXTENDED RELEASE ORAL
Qty: 60 TABLET | Refills: 0 | Status: SHIPPED | OUTPATIENT
Start: 2018-05-29 | End: 2018-06-25

## 2018-05-29 RX ORDER — NAPROXEN 500 MG/1
TABLET ORAL
Qty: 60 TABLET | Refills: 2 | Status: SHIPPED | OUTPATIENT
Start: 2018-05-29 | End: 2018-08-21

## 2018-06-25 DIAGNOSIS — Z79.899 ENCOUNTER FOR LONG-TERM (CURRENT) USE OF MEDICATIONS: ICD-10-CM

## 2018-06-25 DIAGNOSIS — I10 ESSENTIAL HYPERTENSION: ICD-10-CM

## 2018-06-25 RX ORDER — POTASSIUM CHLORIDE 750 MG/1
TABLET, EXTENDED RELEASE ORAL
Qty: 60 TABLET | Refills: 0 | Status: SHIPPED | OUTPATIENT
Start: 2018-06-25 | End: 2018-08-28

## 2018-07-24 DIAGNOSIS — I10 ESSENTIAL HYPERTENSION: ICD-10-CM

## 2018-07-24 RX ORDER — AMLODIPINE BESYLATE 2.5 MG/1
TABLET ORAL
Qty: 60 TABLET | Refills: 2 | Status: SHIPPED | OUTPATIENT
Start: 2018-07-24 | End: 2019-03-19

## 2018-07-24 NOTE — TELEPHONE ENCOUNTER
amLODIPine (NORVASC) 2.5 MG    LAST  Med check 3/5/18   last labs(for RX) 3/5/18  Next  appt scheduled =  none  Angelique Camilo MA    BP Readings from Last 3 Encounters:   05/14/18 122/78   03/05/18 108/72   12/07/17 118/82     Last Basic Metabolic Panel:  Lab Results   Component Value Date     03/05/2018      Lab Results   Component Value Date    POTASSIUM 3.9 03/05/2018     Lab Results   Component Value Date    CHLORIDE 103 03/05/2018     Lab Results   Component Value Date    TARAN 8.9 03/05/2018     No results found for: CO2  Lab Results   Component Value Date    BUN 24 03/05/2018    BUN 30 03/05/2018     Lab Results   Component Value Date    CR 0.79 03/05/2018     Lab Results   Component Value Date    GLC 96 03/05/2018

## 2018-08-21 DIAGNOSIS — Z79.899 ENCOUNTER FOR LONG-TERM (CURRENT) USE OF HIGH-RISK MEDICATION: ICD-10-CM

## 2018-08-21 NOTE — TELEPHONE ENCOUNTER
naproxen (NAPROSYN) 500 MG tablet   LAST  Med check 5/14/18   last labs(for RX) 3/4/18  Next  appt scheduled =  none  Angelique Camilo MA

## 2018-08-23 RX ORDER — NAPROXEN 500 MG/1
TABLET ORAL
Qty: 60 TABLET | Refills: 1 | Status: SHIPPED | OUTPATIENT
Start: 2018-08-23 | End: 2018-12-27

## 2018-08-28 DIAGNOSIS — Z79.899 ENCOUNTER FOR LONG-TERM (CURRENT) USE OF MEDICATIONS: ICD-10-CM

## 2018-08-28 DIAGNOSIS — I10 ESSENTIAL HYPERTENSION: ICD-10-CM

## 2018-08-28 RX ORDER — POTASSIUM CHLORIDE 750 MG/1
10 TABLET, EXTENDED RELEASE ORAL 2 TIMES DAILY
Qty: 60 TABLET | Refills: 0 | Status: SHIPPED | OUTPATIENT
Start: 2018-08-28 | End: 2018-12-04

## 2018-12-04 DIAGNOSIS — Z79.899 ENCOUNTER FOR LONG-TERM (CURRENT) USE OF MEDICATIONS: ICD-10-CM

## 2018-12-04 DIAGNOSIS — I10 ESSENTIAL HYPERTENSION: ICD-10-CM

## 2018-12-05 NOTE — TELEPHONE ENCOUNTER
potassium chloride ER (K-TAB/KLOR-CON) 10 MEQ CR tablet   LAST  Med check 3/5/18   last labs(for RX) 3/5/18  Next  appt scheduled =  none  Angelique Camilo MA    Last Comprehensive Metabolic Panel:  Sodium   Date Value Ref Range Status   03/05/2018 145 (H) 134 - 144 mmol/L Final     Potassium   Date Value Ref Range Status   03/05/2018 3.9 3.5 - 5.2 mmol/L Final     Chloride   Date Value Ref Range Status   03/05/2018 103 96 - 106 mmol/L Final     Glucose   Date Value Ref Range Status   03/05/2018 96 65 - 99 mg/dL Final     Urea Nitrogen   Date Value Ref Range Status   03/05/2018 24 8 - 27 mg/dL Final     BUN/Creatinine Ratio   Date Value Ref Range Status   03/05/2018 30 (H) 10 - 24 Final     Creatinine   Date Value Ref Range Status   03/05/2018 0.79 0.76 - 1.27 mg/dL Final     Calcium   Date Value Ref Range Status   03/05/2018 8.9 8.6 - 10.2 mg/dL Final

## 2018-12-06 RX ORDER — POTASSIUM CHLORIDE 750 MG/1
TABLET, EXTENDED RELEASE ORAL
Qty: 60 TABLET | Refills: 0 | Status: SHIPPED | OUTPATIENT
Start: 2018-12-06 | End: 2018-12-27

## 2018-12-27 DIAGNOSIS — Z79.899 ENCOUNTER FOR LONG-TERM (CURRENT) USE OF MEDICATIONS: ICD-10-CM

## 2018-12-27 DIAGNOSIS — I10 ESSENTIAL HYPERTENSION: ICD-10-CM

## 2018-12-27 DIAGNOSIS — Z79.899 ENCOUNTER FOR LONG-TERM (CURRENT) USE OF HIGH-RISK MEDICATION: ICD-10-CM

## 2018-12-27 RX ORDER — NAPROXEN 500 MG/1
TABLET ORAL
Qty: 60 TABLET | Refills: 1 | Status: SHIPPED | OUTPATIENT
Start: 2018-12-27 | End: 2019-03-19

## 2018-12-27 RX ORDER — POTASSIUM CHLORIDE 750 MG/1
TABLET, EXTENDED RELEASE ORAL
Qty: 60 TABLET | Refills: 2 | Status: SHIPPED | OUTPATIENT
Start: 2018-12-27 | End: 2019-03-19

## 2019-01-29 ENCOUNTER — TELEPHONE (OUTPATIENT)
Dept: FAMILY MEDICINE | Facility: CLINIC | Age: 64
End: 2019-01-29

## 2019-01-29 DIAGNOSIS — I83.893 VARICOSE VEINS OF BOTH LEGS WITH EDEMA: Primary | ICD-10-CM

## 2019-01-29 DIAGNOSIS — I10 ESSENTIAL HYPERTENSION: ICD-10-CM

## 2019-01-29 RX ORDER — HYDROCHLOROTHIAZIDE 12.5 MG/1
1 CAPSULE ORAL DAILY
Qty: 30 CAPSULE | Refills: 5 | Status: SHIPPED | OUTPATIENT
Start: 2019-01-29 | End: 2019-03-19

## 2019-01-29 NOTE — TELEPHONE ENCOUNTER
Patient calls requesting order be faxed to [facility] for compression stockings. States in past has been able to purchase these there without rx but now requiring MD order.  Patient requests order for 1 pair of bilateral thigh high beige stockings with compression of 20-30 mmHg. Has been using these for years due to varicose veins with edema.   Has been seen by provider at [facility] in past.     Per Dr. Nils blackburn for order as patient requests. DME order faxed to [facility]. Patient aware.  Maggie Pascal RN

## 2019-03-11 DIAGNOSIS — Z12.5 SCREENING FOR PROSTATE CANCER: ICD-10-CM

## 2019-03-11 DIAGNOSIS — Z13.6 SCREENING FOR CARDIOVASCULAR CONDITION: ICD-10-CM

## 2019-03-11 DIAGNOSIS — I10 ESSENTIAL HYPERTENSION: Primary | ICD-10-CM

## 2019-03-11 NOTE — LETTER
[facility] [address] [place]  [address]  Phone: [phone]    March 19, 2019      Abdiaziz Kimball  [address] [facility] [address]              Dear Abdiaziz,    I am writing to report that your included test results are within expected ranges. I do not suggest that we make any changes at this time.         Sincerely,     El Wray M.D.    Results for orders placed or performed in visit on 03/11/19   Comp. Metabolic Panel (14) ([facility])   Result Value Ref Range    Glucose 89 65 - 99 mg/dL    Urea Nitrogen 29 (H) 8 - 27 mg/dL    Creatinine 0.75 (L) 0.76 - 1.27 mg/dL    eGFR If NonAfricn Am 96 >59 mL/min/1.73    eGFR If Africn Am 111 >59 mL/min/1.73    BUN/Creatinine Ratio 39 (H) 10 - 24    Sodium 143 134 - 144 mmol/L    Potassium 3.3 (L) 3.5 - 5.2 mmol/L    Chloride 102 96 - 106 mmol/L    Total CO2 30 (H) 20 - 29 mmol/L    Calcium 8.8 8.6 - 10.2 mg/dL    Protein Total 6.2 6.0 - 8.5 g/dL    Albumin 4.3 3.6 - 4.8 g/dL    Globulin, Total 1.9 1.5 - 4.5 g/dL    A/G Ratio 2.3 (H) 1.2 - 2.2    Bilirubin Total 0.9 0.0 - 1.2 mg/dL    Alkaline Phosphatase 83 39 - 117 IU/L    AST 18 0 - 40 IU/L    ALT 14 0 - 44 IU/L    Narrative    Performed at:  01 - [facility] [address], [place], [place]  [id number]  : Sonny Collins MD, Phone:  [phone]   Lipid Panel ([facility])   Result Value Ref Range    Cholesterol 168 100 - 199 mg/dL    Triglycerides 68 0 - 149 mg/dL    HDL Cholesterol 44 >39 mg/dL    VLDL Cholesterol Phi 14 5 - 40 mg/dL    LDL Cholesterol Calculated 110 (H) 0 - 99 mg/dL    LDL/HDL Ratio 2.5 0.0 - 3.6 ratio    Narrative    Performed at:  01 - [facility] [address], [place], [place]  [id number]  : Sonny Collins MD, Phone:  [phone]   PSA Serum ([facility])   Result Value Ref Range    PSA NG/ML 1.0 0.0 - 4.0 ng/mL    Narrative    Performed at:  01 - [facility] [address], [place], [place]  [id number]  : Sonny Collins  MD, Phone:  [phone]   Nicotine and Metabolite Quant ([facility])   Result Value Ref Range    Nicotine None Detected ng/mL    Cotinine None Detected ng/mL    Narrative    Performed at:  01 - [facility]  [id number]  : Deana Tao MD, Phone:  [phone]   Hemoglobin A1C ([facility])   Result Value Ref Range    Hemoglobin A1C 5.2 4.8 - 5.6 %    Narrative    Performed at:  01 - [facility] [address], [place], [place]  [id number]  : Sonny Collins MD, Phone:  [phone]

## 2019-03-11 NOTE — PROGRESS NOTES
fasting pre cpx labs - Nils 3/19/18- psa, comp, lipid,  with  A1c, contine level-(nicotine & metabolic quant #[id number])  test for work insurance form  Angelique Camilo MA March 11, 2019 12:57 PM

## 2019-03-12 LAB
ALBUMIN SERPL-MCNC: 4.3 G/DL (ref 3.6–4.8)
ALBUMIN/GLOB SERPL: 2.3 {RATIO} (ref 1.2–2.2)
ALP SERPL-CCNC: 83 IU/L (ref 39–117)
ALT SERPL-CCNC: 14 IU/L (ref 0–44)
AST SERPL-CCNC: 18 IU/L (ref 0–40)
BILIRUB SERPL-MCNC: 0.9 MG/DL (ref 0–1.2)
BUN SERPL-MCNC: 29 MG/DL (ref 8–27)
BUN/CREATININE RATIO: 39 (ref 10–24)
CALCIUM SERPL-MCNC: 8.8 MG/DL (ref 8.6–10.2)
CHLORIDE SERPLBLD-SCNC: 102 MMOL/L (ref 96–106)
CHOLEST SERPL-MCNC: 168 MG/DL (ref 100–199)
CREAT SERPL-MCNC: 0.75 MG/DL (ref 0.76–1.27)
EGFR IF AFRICN AM: 111 ML/MIN/1.73
EGFR IF NONAFRICN AM: 96 ML/MIN/1.73
GLOBULIN, TOTAL: 1.9 G/DL (ref 1.5–4.5)
GLUCOSE SERPL-MCNC: 89 MG/DL (ref 65–99)
HBA1C MFR BLD: 5.2 % (ref 4.8–5.6)
HDLC SERPL-MCNC: 44 MG/DL
LDL/HDL RATIO: 2.5 RATIO (ref 0–3.6)
LDLC SERPL CALC-MCNC: 110 MG/DL (ref 0–99)
POTASSIUM SERPL-SCNC: 3.3 MMOL/L (ref 3.5–5.2)
PROT SERPL-MCNC: 6.2 G/DL (ref 6–8.5)
PSA NG/ML: 1 NG/ML (ref 0–4)
SODIUM SERPL-SCNC: 143 MMOL/L (ref 134–144)
TOTAL CO2: 30 MMOL/L (ref 20–29)
TRIGL SERPL-MCNC: 68 MG/DL (ref 0–149)
VLDLC SERPL CALC-MCNC: 14 MG/DL (ref 5–40)

## 2019-03-16 LAB
COTININE: NORMAL NG/ML
NICOTINE: NORMAL NG/ML

## 2019-03-19 ENCOUNTER — OFFICE VISIT (OUTPATIENT)
Dept: FAMILY MEDICINE | Facility: CLINIC | Age: 64
End: 2019-03-19

## 2019-03-19 VITALS
BODY MASS INDEX: 26.83 KG/M2 | HEIGHT: 70 IN | OXYGEN SATURATION: 97 % | DIASTOLIC BLOOD PRESSURE: 80 MMHG | RESPIRATION RATE: 16 BRPM | WEIGHT: 187.4 LBS | SYSTOLIC BLOOD PRESSURE: 126 MMHG | HEART RATE: 60 BPM

## 2019-03-19 DIAGNOSIS — E87.6 HYPOKALEMIA: ICD-10-CM

## 2019-03-19 DIAGNOSIS — I10 ESSENTIAL HYPERTENSION: ICD-10-CM

## 2019-03-19 DIAGNOSIS — G56.02 CARPAL TUNNEL SYNDROME OF LEFT WRIST: ICD-10-CM

## 2019-03-19 DIAGNOSIS — Z00.00 MEDICARE ANNUAL WELLNESS VISIT, SUBSEQUENT: Primary | ICD-10-CM

## 2019-03-19 PROCEDURE — 99397 PER PM REEVAL EST PAT 65+ YR: CPT | Performed by: FAMILY MEDICINE

## 2019-03-19 PROCEDURE — 99214 OFFICE O/P EST MOD 30 MIN: CPT | Mod: 25 | Performed by: FAMILY MEDICINE

## 2019-03-19 RX ORDER — NAPROXEN 500 MG/1
TABLET ORAL
Qty: 90 TABLET | Refills: 3 | Status: SHIPPED | OUTPATIENT
Start: 2019-03-19 | End: 2020-08-25

## 2019-03-19 RX ORDER — POTASSIUM CHLORIDE 750 MG/1
TABLET, EXTENDED RELEASE ORAL
Qty: 180 TABLET | Refills: 3 | Status: SHIPPED | OUTPATIENT
Start: 2019-03-19 | End: 2020-04-06

## 2019-03-19 RX ORDER — AMLODIPINE BESYLATE 2.5 MG/1
TABLET ORAL
Qty: 90 TABLET | Refills: 3 | Status: SHIPPED | OUTPATIENT
Start: 2019-03-19 | End: 2020-04-06

## 2019-03-19 RX ORDER — NAPROXEN 500 MG/1
TABLET ORAL
Qty: 60 TABLET | Refills: 1 | Status: SHIPPED | OUTPATIENT
Start: 2019-03-19 | End: 2019-03-19

## 2019-03-19 RX ORDER — HYDROCHLOROTHIAZIDE 12.5 MG/1
1 CAPSULE ORAL DAILY
Qty: 90 CAPSULE | Refills: 3 | Status: SHIPPED | OUTPATIENT
Start: 2019-03-19 | End: 2020-04-26

## 2019-03-19 ASSESSMENT — MIFFLIN-ST. JEOR: SCORE: 1633.35

## 2019-03-19 NOTE — PROGRESS NOTES
"  SUBJECTIVE:   Abdiaziz Kimball is a 63 year old male who presents for Preventive Visit.    Are you in the first 12 months of your Medicare Part B coverage?  No    Physical Health:    In general, how would you rate your overall physical health? good    Outside of work, how many days during the week do you exercise? 4-5 days/week    Outside of work, approximately how many minutes a day do you exercise?30-45 minutes    If you drink alcohol do you typically have >3 drinks per day or >7 drinks per week? No    Do you usually eat at least 4 servings of fruit and vegetables a day, include whole grains & fiber and avoid regularly eating high fat or \"junk\" foods? Yes    Do you have any problems taking medications regularly?  No    Do you have any side effects from medications? Yes-swelling    Needs assistance for the following daily activities: no assistance needed    Which of the following safety concerns are present in your home?  none identified     Hearing impairment: No-hearing pass on both ears    In the past 6 months, have you been bothered by leaking of urine? no    Mental Health:    In general, how would you rate your overall mental or emotional health? good  PHQ-2 Score:      Do you feel safe in your environment? Yes    Do you have a Health Care Directive?yes    Additional concerns to address?  YES    Fall risk:  Fallen 2 or more times in the past year?: Yes  Any fall with injury in the past year?: No  click delete button to remove this line now  Cognitive Screenin) Repeat 3 items (Leader, Season, Table)    2) Clock draw: NORMAL  3) 3 item recall: Recalls 3 objects  Results: 3 items recalled: COGNITIVE IMPAIRMENT LESS LIKELY    Mini-CogTM Copyright [employer]ILL [employer]. Licensed by the author for use in [facility]; reprinted with permission (libby@.[facility]). All rights reserved.      Do you have sleep apnea, excessive snoring or daytime drowsiness?: no        PROBLEMS TO ADD " ON...  -------------------------------------  Blood presure remains well controlled when checked out of clinic.    Reviewed last 6 BP readings in chart:  BP Readings from Last 6 Encounters:   03/19/19 126/80   05/14/18 122/78   03/05/18 108/72   12/07/17 118/82   07/24/17 130/78   06/19/17 132/78       he has not experienced any significant side effects from medications for hypertension.    NO active cardiac complaints or symptoms with exercise.    The patient complains of typical hand, wrist and arm symptoms of numbness, and weakness when in bed or lifts arm.  he has tried splinting in the past and it mostly improved. He now has issues if he lifts the arm up.    Reviewed and updated as needed this visit by clinical staff  Tobacco  Allergies  Meds         Reviewed and updated as needed this visit by Provider        Social History     Tobacco Use     Smoking status: Never Smoker     Smokeless tobacco: Never Used   Substance Use Topics     Alcohol use: Yes     Alcohol/week: 0.0 - 0.6 oz     Comment: ocassional                           Current providers sharing in care for this patient include:   Patient Care Team:  El Wray MD as PCP - General (Family Practice)  El Wray MD as Assigned PCP    The following health maintenance items are reviewed in Epic and correct as of today:  Health Maintenance   Topic Date Due     HIV SCREEN (SYSTEM ASSIGNED)  11/13/1973     ZOSTER IMMUNIZATION (1 of 2) 11/13/2003     PHQ-2 Q1 YR  12/31/2016     ADVANCE DIRECTIVE PLANNING Q5 YRS  11/06/2017     INFLUENZA VACCINE (1) 09/01/2018     MEDICARE ANNUAL WELLNESS VISIT  11/13/2018     FALL RISK ASSESSMENT  11/13/2018     AORTIC ANEURYSM SCREENING (SYSTEM ASSIGNED)  11/13/2018     COLON CANCER SCREEN (SYSTEM ASSIGNED)  01/06/2021     LIPID SCREEN Q5 YR MALE (SYSTEM ASSIGNED)  03/11/2024     DTAP/TDAP/TD IMMUNIZATION (2 - Td) 12/31/2025     HEPATITIS C SCREENING  Completed     IPV IMMUNIZATION  Aged Out      "MENINGITIS IMMUNIZATION  Aged Out     Patient Active Problem List   Diagnosis     HTN (hypertension)     Hypokalemia     Foot pain     Advanced directives, counseling/discussion     Health Care Home     Dry skin     Varicose veins of both legs with edema     Encounter for long-term (current) use of high-risk medication     Benign prostatic hyperplasia with nocturia     Past Surgical History:   Procedure Laterality Date     ARTHROSCOPY KNEE RT/LT Left 1990's     ARTHROSCOPY KNEE RT/LT Left      ARTHROSCOPY KNEE RT/LT Left     [facility]     ARTHROSCOPY KNEE RT/LT Left 2003ish    [facility]     ARTHROSCOPY KNEE RT/LT Right 2005    Mark Quintero     HERNIA REPAIR, INGUINAL RT/LT Right 2004    Incarerated hernia with resection, Dr. Moraes     VASECTOMY  1988       Social History     Tobacco Use     Smoking status: Never Smoker     Smokeless tobacco: Never Used   Substance Use Topics     Alcohol use: Yes     Alcohol/week: 0.0 - 0.6 oz     Comment: ocassional     No family history on file.      Pneumonia Vaccine:He refuses    ROS:  Constitutional, HEENT, cardiovascular, pulmonary, GI, , musculoskeletal, neuro, skin, endocrine and psych systems are negative, except as otherwise noted.    OBJECTIVE:   /80   Pulse 60   Resp 16   Ht 1.765 m (5' 9.5\")   Wt 85 kg (187 lb 6.4 oz)   SpO2 97%   BMI 27.28 kg/m   Estimated body mass index is 27.28 kg/m  as calculated from the following:    Height as of this encounter: 1.765 m (5' 9.5\").    Weight as of this encounter: 85 kg (187 lb 6.4 oz).  EXAM:   GENERAL: healthy, alert and no distress  EYES: Eyes grossly normal to inspection, PERRL and conjunctivae and sclerae normal  HENT: ear canals and TM's normal, nose and mouth without ulcers or lesions  NECK: no adenopathy, no asymmetry, masses, or scars and thyroid normal to palpation  RESP: lungs clear to auscultation - no rales, rhonchi or wheezes  CV: regular rate and rhythm, normal S1 S2, no S3 or S4, no " murmur, click or rub, no peripheral edema and peripheral pulses strong  ABDOMEN: soft, nontender, no hepatosplenomegaly, no masses and bowel sounds normal  MS: no gross musculoskeletal defects noted, no edema  SKIN: no suspicious lesions or rashes  NEURO: Normal strength and tone, mentation intact and speech normal  PSYCH: mentation appears normal, affect normal/bright    Diagnostic Test Results:  none     ASSESSMENT / PLAN:   Abdiaziz was seen today for physical.    Diagnoses and all orders for this visit:    Medicare annual wellness visit, subsequent    Essential hypertension  Discussed current hypertension treatment guidelines, including indications for treatment and treatment options.  Discussed the importance for aggressive management of HTN to prevent vascular complications later.  Recommended lower fat, lower carbohydrate, and lower sodium (<2000 mg)diet.  Discussed required intervals for follow up on HTN, lab studies.  Recommened pt. follow their blood pressures outside the clinic to ensure that BPs are remaining within guidelines, and to contact me if the readings are not within guidelines on a regular basis so we can adjust treatment as needed.    -     amLODIPine (NORVASC) 2.5 MG tablet; Take 1 tablet (2.5 mg) by mouth1 times daily  -     hydrochlorothiazide (MICROZIDE) 12.5 MG capsule; Take 1 capsule (12.5 mg) by mouth daily  -     potassium chloride ER (K-TAB/KLOR-CON) 10 MEQ CR tablet; Take 1 tablet (10 mEq) by mouth 2 times daily    Hypokalemia  Refill of medications    Carpal tunnel syndrome of left wrist  May be spinal stenosis, see how the course progresses.    Other orders  -     naproxen (NAPROSYN) 500 MG tablet; TAKE ONE TABLET BY MOUTH TWICE DAILY WITH MEALS        End of Life Planning:  Patient currently has an advanced directive: Yes.  Practitioner is supportive of decision.    COUNSELING:  Reviewed preventive health counseling, as reflected in patient instructions       Healthy  "diet/nutrition       Vision screening    BP Readings from Last 1 Encounters:   03/19/19 126/80     Estimated body mass index is 27.28 kg/m  as calculated from the following:    Height as of this encounter: 1.765 m (5' 9.5\").    Weight as of this encounter: 85 kg (187 lb 6.4 oz).      Weight management plan: Discussed healthy diet and exercise guidelines     reports that  has never smoked. he has never used smokeless tobacco.      Appropriate preventive services were discussed with this patient, including applicable screening as appropriate for cardiovascular disease, diabetes, osteopenia/osteoporosis, and glaucoma.  As appropriate for age/gender, discussed screening for colorectal cancer, prostate cancer, breast cancer, and cervical cancer. Checklist reviewing preventive services available has been given to the patient.    Reviewed patients plan of care and provided an AVS. The Basic Care Plan (routine screening as documented in Health Maintenance) for Abdiaziz meets the Care Plan requirement. This Care Plan has been established and reviewed with the Patient.    Counseling Resources:  ATP IV Guidelines  Pooled Cohorts Equation Calculator  Breast Cancer Risk Calculator  FRAX Risk Assessment  ICSI Preventive Guidelines  Dietary Guidelines for Americans, 2010  USDA's MyPlate  ASA Prophylaxis  Lung CA Screening    El Wray MD  [facility]  "

## 2019-03-19 NOTE — PATIENT INSTRUCTIONS
Preventive Health Recommendations:     See your health care provider every year to    Review health changes.     Discuss preventive care.      Review your medicines if your doctor has prescribed any.    Talk with your health care provider about whether you should have a test to screen for prostate cancer (PSA).    Every 3 years, have a diabetes test (fasting glucose). If you are at risk for diabetes, you should have this test more often.    Every 5 years, have a cholesterol test. Have this test more often if you are at risk for high cholesterol or heart disease.     Every 10 years, have a colonoscopy. Or, have a yearly FIT test (stool test). These exams will check for colon cancer.    Talk to with your health care provider about screening for Abdominal Aortic Aneurysm if you have a family history of AAA or have a history of smoking.  Shots:     Get a flu shot each year.     Get a tetanus shot every 10 years.     Talk to your doctor about your pneumonia vaccines. There are now two you should receive - Pneumovax (PPSV 23) and Prevnar (PCV 13).    Talk to your pharmacist about a shingles vaccine.     Talk to your doctor about the hepatitis B vaccine.  Nutrition:     Eat at least 5 servings of fruits and vegetables each day.     Eat whole-grain bread, whole-wheat pasta and brown rice instead of white grains and rice.     Get adequate Calcium and Vitamin D.   Lifestyle    Exercise for at least 150 minutes a week (30 minutes a day, 5 days a week). This will help you control your weight and prevent disease.     Limit alcohol to one drink per day.     No smoking.     Wear sunscreen to prevent skin cancer.     See your dentist every six months for an exam and cleaning.     See your eye doctor every 1 to 2 years to screen for conditions such as glaucoma, macular degeneration and cataracts.    Personalized Prevention Plan  You are due for the preventive services outlined below.  Your care team is available to assist you in  scheduling these services.  If you have already completed any of these items, please share that information with your care team to update in your medical record.    Health Maintenance Due   Topic Date Due     HIV SCREEN (SYSTEM ASSIGNED)  11[birth year]1971     Zoster (Shingles) Vaccine (1 of 2) 11/13/2003     Depression Assessment 2 - yearly  12/31/2016     Discuss Advance Directive Planning  11/06/2017     Flu Vaccine (1) 09/01/2018     Annual Wellness Visit  11/13/2018     FALL RISK ASSESSMENT  11/13/2018     AORTIC ANEURYSM SCREENING (SYSTEM ASSIGNED)  11/13/2018

## 2019-04-06 ENCOUNTER — HOSPITAL ENCOUNTER (EMERGENCY)
Facility: CLINIC | Age: 64
Discharge: HOME | End: 2019-04-06
Attending: PHYSICIAN ASSISTANT | Admitting: PHYSICIAN ASSISTANT
Payer: COMMERCIAL

## 2019-04-06 VITALS
RESPIRATION RATE: 16 BRPM | TEMPERATURE: 98 F | DIASTOLIC BLOOD PRESSURE: 94 MMHG | SYSTOLIC BLOOD PRESSURE: 152 MMHG | OXYGEN SATURATION: 99 %

## 2019-04-06 DIAGNOSIS — B02.9 HERPES ZOSTER WITHOUT COMPLICATION: ICD-10-CM

## 2019-04-06 PROCEDURE — 99283 EMERGENCY DEPT VISIT LOW MDM: CPT

## 2019-04-06 RX ORDER — VALACYCLOVIR HYDROCHLORIDE 1 G/1
1000 TABLET, FILM COATED ORAL 3 TIMES DAILY
Qty: 21 TABLET | Refills: 0 | Status: SHIPPED | OUTPATIENT
Start: 2019-04-06 | End: 2020-08-25

## 2019-04-06 ASSESSMENT — ENCOUNTER SYMPTOMS
CHILLS: 0
ABDOMINAL PAIN: 0
BACK PAIN: 1
FEVER: 0
NUMBNESS: 0
WEAKNESS: 0

## 2019-04-06 NOTE — ED AVS SNAPSHOT
Emergency Department  [address] [place] [address]  Phone:  [phone]  Fax:  [phone]                                    Abdiaziz Kimball   MRN: [id number]    Department:   Emergency Department   Date of Visit:  4/6/2019           After Visit Summary Signature Page    I have received my discharge instructions, and my questions have been answered. I have discussed any challenges I see with this plan with the nurse or doctor.    ..........................................................................................................................................  Patient/Patient Representative Signature      ..........................................................................................................................................  Patient Representative Print Name and Relationship to Patient    ..................................................               ................................................  Date                                   Time    ..........................................................................................................................................  Reviewed by Signature/Title    ...................................................              ..............................................  Date                                               Time          22EPIC Rev 08/18

## 2019-04-06 NOTE — ED PROVIDER NOTES
History     Chief Complaint:  Back Pain    HPI   Abdiaziz Kimball is a 63 year old male with a history of hypertension who presents with a rash and back pain. The patient states he injured his back years ago and has had some intermittent ongoing back pain since then. However, on Friday, 8 days ago, he notes he woke up with worsening left-sided back pain radiating down his leg, which has been prevalent since then. The patient states the pain is constant, but seems to be worse with movement. He reports he has been using Tylenol with some relief with his last dose this morning around 0730. The patient notes he has an appointment with an orthopedist through [facility] this coming week. Last night, the patient noted some particular sensitivity along the posterior side of his leg and was using ice to alleviate the discomfort. Then, this morning, the patient also states he woke up with a rash on his left buttocks radiating to the posterior side of his left leg. He notes some sensitivity with touching the rash. The patient states he became concerned, however, so he decided to come to the ED for further evaluation of the rash as well as the back pain. He denies any numbness, weakness, abdominal pain, any significant pain or burning sensation along the rash, or other acute symptoms. The patient states he has not received his shingles vaccine.    Allergies:  Penicillin  Lisinopril  Metoprolol     Medications:    Amlodipine  Hydrochlorothiazide  Naproxen  Potassium chloride    Past Medical History:    Benign prostatic hyperplasia  Hypertension  Hyperlipidemia  Hypokalemia  Varicose veins, bilateral lower extremities  Osteoarthritis  Carpal tunnel, bilateral wrists    Past Surgical History:    Multiple arthroscopic knee procedures  Vasectomy  Inguinal hernia repair, incarcerated    Family History:    Cancer, unspecified  Heart disease    Social History:  Smoking status: Former smoker  Alcohol use: Yes, occasionally  Drug use: No  The  patient works as a .  PCP: El Wray  Marital Status:  [2]     Review of Systems   Constitutional: Negative for chills and fever.   Gastrointestinal: Negative for abdominal pain.   Musculoskeletal: Positive for back pain.   Skin: Positive for rash.   Neurological: Negative for weakness and numbness.   All other systems reviewed and are negative.    Physical Exam     Patient Vitals for the past 24 hrs:   BP Temp Temp src Heart Rate Resp SpO2   04/06/19 1528 (!) 152/94 98  F (36.7  C) Oral 58 16 99 %     Physical Exam  General: Alert, interactive. Very pleasant and cooperative.   Head:  Scalp is atraumatic.        Neck:  Normal range of motion.   CV:  Brisk capillary refill to the distal extremities, 2+ DP pulses bilaterally.  Resp:  Non-labored, no retractions or accessory muscle use.  GI:  Abdomen is soft, no distension, no tenderness.   MS:  Normal range of motion. No midline cervical, thoracic, or lumbar tenderness   Skin:  Vesicular rash to left low back spreading down the posterior leg following the L5 dermatome, no surrounding redness to suggest cellulitis. Otherwise skin warm and dry.   Neuro:   5/5 strength throughout the bilateral lower extremities (hip flexion/extension, knee flexion/extension, DF/PF, EHL/FHL); sensation intact to light touch throughout L2-S1 distributions to the lower extremities; 2+ patellar DTRs to the bilateral lower extremities; normal gait  Psych:  Awake. Alert.  Appropriate interactions.     Emergency Department Course   Emergency Department Course:  Past medical records, nursing notes, and vitals reviewed.  1531: I performed an exam of the patient and obtained history, as documented above.     1605: I rechecked the patient. Findings and plan explained to the patient. Patient discharged home with instructions regarding supportive care, medications, and reasons to return. The importance of close follow-up was reviewed.     Impression & Plan    Medical  Decision Making:  Abdiaziz Kimball is a 63 year old male who presents for evaluation of painful rash along his left buttocks and posterior left leg. This is consistent clinically with herpes zoster. We will start valacyclovir for this as noted above. The patient states he will use ibuprofen and Tylenol at home. There are no signs at this point of disseminated zoster nor V1/eye involvement. The patient is not immunocompromised and I see no signs of systemic illness that might have lead to this. I will not get lab work to look for things like HIV or leukemia therefore. See primary care doctor in follow up for recheck and refill of pain medicine if needed. He also presented with left low back pain, which I suspect is related to his shingles. However, he had previously made an appointment with [facility] and he may keep this appointment if he wishes. No red flag symptoms to suggest need for advanced imaging at this time. The patient was understanding and agreeable to the plan and was discharged home in stable condition. All questions/concerns addressed prior to discharge home.     Diagnosis:    ICD-10-CM   1. Herpes zoster without complication B02.9     Disposition: Discharged to home    Discharge Medications:  valACYclovir 1000 mg tablet  Commonly known as:  VALTREX  1,000 mg, Oral, 3 TIMES DAILY       Pauline Napier  4/6/2019    EMERGENCY DEPARTMENT    I, Pauline Napier, am serving as a scribe at 3:31 PM on 4/6/2019 to document services personally performed by Khalida Goss PA-C based on my observations and the provider's statements to me.      Khalida Goss PA-C  04/06/19 [id number]

## 2019-04-06 NOTE — LETTER
April 6, 2019      To Whom It May Concern:      Abdiaziz Kimball was seen in our Emergency Department today, 04/06/19.  I expect his condition to improve over the next 3 days.  He may return to work sooner if feeling improved.    Sincerely,        Khalida Goss PA-C

## 2019-04-06 NOTE — DISCHARGE INSTRUCTIONS
Take ibuprofen 600 mg 3x per day.  This will provide both pain control and fight against inflammation.   You may take 500-1000mg Tylenol 3x per day for additional pain management. Do not exceed 3000mg Acetaminophen (Tylenol) daily.   Follow up with primary care provider in 3-4 days for recheck.   Return to the ED for acutely worsening pain, worsening focal numbness/tingling, or weakness, or any other new/concerning symptoms.

## 2019-10-01 ENCOUNTER — OFFICE VISIT (OUTPATIENT)
Dept: FAMILY MEDICINE | Facility: CLINIC | Age: 64
End: 2019-10-01

## 2019-10-01 VITALS
DIASTOLIC BLOOD PRESSURE: 88 MMHG | HEART RATE: 55 BPM | OXYGEN SATURATION: 98 % | SYSTOLIC BLOOD PRESSURE: 138 MMHG | HEIGHT: 70 IN | WEIGHT: 185.13 LBS | RESPIRATION RATE: 16 BRPM | BODY MASS INDEX: 26.5 KG/M2

## 2019-10-01 DIAGNOSIS — G89.29 CHRONIC PAIN OF BOTH SHOULDERS: ICD-10-CM

## 2019-10-01 DIAGNOSIS — Z13.6 SCREENING FOR HEART DISEASE: Primary | ICD-10-CM

## 2019-10-01 DIAGNOSIS — I10 ESSENTIAL HYPERTENSION: ICD-10-CM

## 2019-10-01 DIAGNOSIS — R20.2 PARESTHESIA AND PAIN OF BOTH UPPER EXTREMITIES: ICD-10-CM

## 2019-10-01 DIAGNOSIS — M25.512 CHRONIC PAIN OF BOTH SHOULDERS: ICD-10-CM

## 2019-10-01 DIAGNOSIS — M79.601 PARESTHESIA AND PAIN OF BOTH UPPER EXTREMITIES: ICD-10-CM

## 2019-10-01 DIAGNOSIS — M79.602 PARESTHESIA AND PAIN OF BOTH UPPER EXTREMITIES: ICD-10-CM

## 2019-10-01 DIAGNOSIS — I83.893 VARICOSE VEINS OF BOTH LEGS WITH EDEMA: ICD-10-CM

## 2019-10-01 DIAGNOSIS — M25.511 CHRONIC PAIN OF BOTH SHOULDERS: ICD-10-CM

## 2019-10-01 LAB — ERYTHROCYTE [SEDIMENTATION RATE] IN BLOOD: 7 MM/HR (ref 0–15)

## 2019-10-01 PROCEDURE — 36415 COLL VENOUS BLD VENIPUNCTURE: CPT | Performed by: FAMILY MEDICINE

## 2019-10-01 PROCEDURE — 85651 RBC SED RATE NONAUTOMATED: CPT | Performed by: FAMILY MEDICINE

## 2019-10-01 PROCEDURE — 99214 OFFICE O/P EST MOD 30 MIN: CPT | Performed by: FAMILY MEDICINE

## 2019-10-01 RX ORDER — MAGNESIUM HYDROXIDE 400 MG/5ML
1 SUSPENSION, ORAL (FINAL DOSE FORM) ORAL DAILY
COMMUNITY
Start: 2015-11-17 | End: 2021-08-17

## 2019-10-01 ASSESSMENT — MIFFLIN-ST. JEOR: SCORE: 1623.03

## 2019-10-01 NOTE — PROGRESS NOTES
"Neck injury c4-6 as a kid with a swimming accident, treated with body cast x 3 months    Ongoing bilateral but R > L paresthesias and pain with minimal elevation of R arm.   Has been evaluated with thoracic outlet [employer] 2010.     Has shoulder issues and has been through PT successfully in the past    Has a bump on the R foot that is painful.   Not sure if there was an injury in the past.      Has felt quite fatigued lately and wonders about having a mini stroke.   No facial sx, memory issues, or lasting sx.   No chest pain but feels as though he's got no endurance.   Extensive heart disease on moms side.   Hx of well controlled BP    Has varicose veins on the LE with L > R.   No pain    ROS otherwise negative including sleep, neuro, CV, skin or GI     /88   Pulse 55   Resp 16   Ht 1.765 m (5' 9.5\")   Wt 84 kg (185 lb 2 oz)   SpO2 98%   BMI 26.95 kg/m      GENERAL: healthy, alert and no distress  EYES: Eyes grossly normal to inspection, PERRL and conjunctivae and sclerae normal  HENT: ear canals and TM's normal, nose and mouth without ulcers or lesions  NECK: no adenopathy, no asymmetry, masses, or scars and thyroid normal to palpation  RESP: lungs clear to auscultation - no rales, rhonchi or wheezes  CV: regular rate and rhythm, normal S1 S2  MS: bump on 5th metatarsal R foot proximally and laterally   Minimal tenderness    SKIN: no suspicious lesions or rashes  NEURO: Normal strength and tone, mentation intact and speech normal  PSYCH: mentation appears normal, affect normal/bright    ASSESSMENT:  1. Paresthesia and pain of both upper extremities  Will obtain screening labs and EMG  - Referral to [facility]  - Creatine Kinase Total Serum ([facility])  - Sed Rate Westergren ([facility])  - Basic Metabolic Panel (8) ([facility])    2. Chronic pain of both shoulders  Will likely need repeat PT if neg labs.  - Creatine Kinase Total Serum ([facility])  - Sed Rate Westergren ([facility])    3. Screening for " heart disease  Will start with CT calcium score for risk stratification  - CT Coronary Calcium Scan; Future     Greater than 25 minutes spent with patient discussing risks and benefits and management with possible outcomes regarding this issue with greater than 50% in counseling for medical decision making and coordination of care.

## 2019-10-03 ENCOUNTER — HEALTH MAINTENANCE LETTER (OUTPATIENT)
Age: 64
End: 2019-10-03

## 2019-10-03 LAB
BUN SERPL-MCNC: 24 MG/DL (ref 8–27)
BUN/CREATININE RATIO: 25 (ref 10–24)
CALCIUM SERPL-MCNC: 9.2 MG/DL (ref 8.6–10.2)
CHLORIDE SERPLBLD-SCNC: 103 MMOL/L (ref 96–106)
CK SERPL-CCNC: 125 U/L (ref 24–204)
CREAT SERPL-MCNC: 0.96 MG/DL (ref 0.76–1.27)
EGFR IF AFRICN AM: 95 ML/MIN/1.73
EGFR IF NONAFRICN AM: 83 ML/MIN/1.73
GLUCOSE SERPL-MCNC: 90 MG/DL (ref 65–99)
POTASSIUM SERPL-SCNC: 3.9 MMOL/L (ref 3.5–5.2)
SODIUM SERPL-SCNC: 143 MMOL/L (ref 134–144)
TOTAL CO2: 26 MMOL/L (ref 20–29)

## 2019-11-12 ENCOUNTER — TRANSFERRED RECORDS (OUTPATIENT)
Dept: FAMILY MEDICINE | Facility: CLINIC | Age: 64
End: 2019-11-12

## 2019-11-13 ENCOUNTER — TRANSFERRED RECORDS (OUTPATIENT)
Dept: FAMILY MEDICINE | Facility: CLINIC | Age: 64
End: 2019-11-13

## 2019-11-22 ENCOUNTER — TRANSFERRED RECORDS (OUTPATIENT)
Dept: FAMILY MEDICINE | Facility: CLINIC | Age: 64
End: 2019-11-22

## 2019-12-09 ENCOUNTER — TRANSFERRED RECORDS (OUTPATIENT)
Dept: FAMILY MEDICINE | Facility: CLINIC | Age: 64
End: 2019-12-09

## 2020-04-04 DIAGNOSIS — I10 ESSENTIAL HYPERTENSION: ICD-10-CM

## 2020-04-06 RX ORDER — AMLODIPINE BESYLATE 2.5 MG/1
TABLET ORAL
Qty: 90 TABLET | Refills: 2 | Status: SHIPPED | OUTPATIENT
Start: 2020-04-06 | End: 2020-08-25

## 2020-04-06 RX ORDER — POTASSIUM CHLORIDE 750 MG/1
TABLET, EXTENDED RELEASE ORAL
Qty: 180 TABLET | Refills: 2 | Status: SHIPPED | OUTPATIENT
Start: 2020-04-06 | End: 2020-08-25

## 2020-04-25 DIAGNOSIS — I10 ESSENTIAL HYPERTENSION: ICD-10-CM

## 2020-04-26 RX ORDER — HYDROCHLOROTHIAZIDE 12.5 MG/1
1 CAPSULE ORAL DAILY
Qty: 90 CAPSULE | Refills: 0 | Status: SHIPPED | OUTPATIENT
Start: 2020-04-26 | End: 2020-07-21

## 2020-07-06 ENCOUNTER — OFFICE VISIT (OUTPATIENT)
Dept: FAMILY MEDICINE | Facility: CLINIC | Age: 65
End: 2020-07-06

## 2020-07-06 VITALS
RESPIRATION RATE: 16 BRPM | BODY MASS INDEX: 27.2 KG/M2 | SYSTOLIC BLOOD PRESSURE: 144 MMHG | OXYGEN SATURATION: 97 % | DIASTOLIC BLOOD PRESSURE: 90 MMHG | WEIGHT: 190 LBS | TEMPERATURE: 98.2 F | HEART RATE: 74 BPM | HEIGHT: 70 IN

## 2020-07-06 DIAGNOSIS — R35.1 BENIGN PROSTATIC HYPERPLASIA WITH NOCTURIA: ICD-10-CM

## 2020-07-06 DIAGNOSIS — Z00.00 ENCOUNTER FOR MEDICARE ANNUAL WELLNESS EXAM: Primary | ICD-10-CM

## 2020-07-06 DIAGNOSIS — N40.1 BENIGN PROSTATIC HYPERPLASIA WITH NOCTURIA: ICD-10-CM

## 2020-07-06 DIAGNOSIS — I83.893 VARICOSE VEINS OF BOTH LEGS WITH EDEMA: ICD-10-CM

## 2020-07-06 DIAGNOSIS — Z13.6 SCREENING FOR CARDIOVASCULAR CONDITION: ICD-10-CM

## 2020-07-06 DIAGNOSIS — Z12.5 SCREENING FOR PROSTATE CANCER: ICD-10-CM

## 2020-07-06 DIAGNOSIS — I10 ESSENTIAL HYPERTENSION: ICD-10-CM

## 2020-07-06 LAB
% GRANULOCYTES: 61.1 % (ref 42.2–75.2)
HCT VFR BLD AUTO: 42.3 % (ref 39–51)
HEMOGLOBIN: 15 G/DL (ref 13.4–17.5)
LYMPHOCYTES NFR BLD AUTO: 30.5 % (ref 20.5–51.1)
MCH RBC QN AUTO: 30.9 PG (ref 27–31)
MCHC RBC AUTO-ENTMCNC: 35.5 G/DL (ref 33–37)
MCV RBC AUTO: 86.9 FL (ref 80–100)
MONOCYTES NFR BLD AUTO: 8.4 % (ref 1.7–9.3)
PLATELET # BLD AUTO: 202 K/UL (ref 140–450)
RBC # BLD AUTO: 4.87 X10/CMM (ref 4.2–5.9)
WBC # BLD AUTO: 5.6 X10/CMM (ref 3.8–11)

## 2020-07-06 PROCEDURE — 99213 OFFICE O/P EST LOW 20 MIN: CPT | Mod: 25 | Performed by: FAMILY MEDICINE

## 2020-07-06 PROCEDURE — 85025 COMPLETE CBC W/AUTO DIFF WBC: CPT | Performed by: FAMILY MEDICINE

## 2020-07-06 PROCEDURE — 36415 COLL VENOUS BLD VENIPUNCTURE: CPT | Performed by: FAMILY MEDICINE

## 2020-07-06 PROCEDURE — 99397 PER PM REEVAL EST PAT 65+ YR: CPT | Performed by: FAMILY MEDICINE

## 2020-07-06 ASSESSMENT — MIFFLIN-ST. JEOR: SCORE: 1640.14

## 2020-07-06 NOTE — LETTER
[facility] [address] [place]  [address]  Phone: [phone]    July 7, 2020      Abdiaziz Kimball  [address] [facility] [address]              Dear Abdiaziz,    I am writing to report that your included test results are within expected ranges.         Sincerely,     El Wray M.D.    Results for orders placed or performed in visit on 07/06/20   Comp. Metabolic Panel (14) ([facility])     Status: None   Result Value Ref Range    Glucose 92 65 - 99 mg/dL    Urea Nitrogen 22 8 - 27 mg/dL    Creatinine 0.90 0.76 - 1.27 mg/dL    eGFR If NonAfricn Am 89 >59 mL/min/1.73    eGFR If Africn Am 103 >59 mL/min/1.73    BUN/Creatinine Ratio 24 10 - 24    Sodium 143 134 - 144 mmol/L    Potassium 3.6 3.5 - 5.2 mmol/L    Chloride 106 96 - 106 mmol/L    Total CO2 27 20 - 29 mmol/L    Calcium 8.7 8.6 - 10.2 mg/dL    Protein Total 6.6 6.0 - 8.5 g/dL    Albumin 4.4 3.8 - 4.8 g/dL    Globulin, Total 2.2 1.5 - 4.5 g/dL    A/G Ratio 2.0 1.2 - 2.2    Bilirubin Total 0.7 0.0 - 1.2 mg/dL    Alkaline Phosphatase 76 39 - 117 IU/L    AST 18 0 - 40 IU/L    ALT 12 0 - 44 IU/L    Narrative    Performed at:  01 - [facility] [address], [place], [place]  [id number]  : Sonny Collins MD, Phone:  [phone]   Lipid Panel ([facility])     Status: Abnormal   Result Value Ref Range    Cholesterol 184 100 - 199 mg/dL    Triglycerides 107 0 - 149 mg/dL    HDL Cholesterol 37 (L) >39 mg/dL    VLDL Cholesterol Phi 21 5 - 40 mg/dL    LDL Cholesterol Calculated 126 (H) 0 - 99 mg/dL    LDL/HDL Ratio 3.4 0.0 - 3.6 ratio    Narrative    Performed at:  01 - [facility] [address], [place], [place]  [id number]  : Sonny Collins MD, Phone:  [phone]   CBC with Diff/Plt ([facility])     Status: None   Result Value Ref Range    WBC x10/cmm 5.6 3.8 - 11.0 x10/cmm    % Lymphocytes 30.5 20.5 - 51.1 %    % Monocytes 8.4 1.7 - 9.3 %    % Granulocytes 61.1 42.2 - 75.2 %    RBC x10/cmm 4.87 4.2 - 5.9  x10/cmm    Hemoglobin 15.0 13.4 - 17.5 g/dl    Hematocrit 42.3 39 - 51 %    MCV 86.9 80 - 100 fL    MCH 30.9 27.0 - 31.0 pg    MCHC 35.5 33.0 - 37.0 g/dL    Platelet Count 202 140 - 450 K/uL   PSA Serum ([facility])     Status: None   Result Value Ref Range    PSA NG/ML 1.0 0.0 - 4.0 ng/mL    Narrative    Performed at:  01 - [facility] [address], [place], [place]  [id number]  : Sonny Collins MD, Phone:  [phone]

## 2020-07-06 NOTE — LETTER
July 7, 2020      Abdiaziz Kimball  [address] [facility] [address]        Dear ,    We are writing to inform you of your test results.    {results letter list:[id number]}    Resulted Orders   Comp. Metabolic Panel (14) ([facility])   Result Value Ref Range    Glucose 92 65 - 99 mg/dL    Urea Nitrogen 22 8 - 27 mg/dL    Creatinine 0.90 0.76 - 1.27 mg/dL    eGFR If NonAfricn Am 89 >59 mL/min/1.73    eGFR If Africn Am 103 >59 mL/min/1.73    BUN/Creatinine Ratio 24 10 - 24    Sodium 143 134 - 144 mmol/L    Potassium 3.6 3.5 - 5.2 mmol/L    Chloride 106 96 - 106 mmol/L    Total CO2 27 20 - 29 mmol/L    Calcium 8.7 8.6 - 10.2 mg/dL    Protein Total 6.6 6.0 - 8.5 g/dL    Albumin 4.4 3.8 - 4.8 g/dL    Globulin, Total 2.2 1.5 - 4.5 g/dL    A/G Ratio 2.0 1.2 - 2.2    Bilirubin Total 0.7 0.0 - 1.2 mg/dL    Alkaline Phosphatase 76 39 - 117 IU/L    AST 18 0 - 40 IU/L    ALT 12 0 - 44 IU/L    Narrative    Performed at:  01 - [facility] [address], [place], [place]  [id number]  : Sonny Collins MD, Phone:  [phone]   Lipid Panel ([facility])   Result Value Ref Range    Cholesterol 184 100 - 199 mg/dL    Triglycerides 107 0 - 149 mg/dL    HDL Cholesterol 37 (L) >39 mg/dL    VLDL Cholesterol Phi 21 5 - 40 mg/dL    LDL Cholesterol Calculated 126 (H) 0 - 99 mg/dL    LDL/HDL Ratio 3.4 0.0 - 3.6 ratio      Comment:                                          LDL/HDL Ratio                                              Men  Women                                1/2 Avg.Risk  1.0    1.5                                    Avg.Risk  3.6    3.2                                 2X Avg.Risk  6.2    5.0                                 3X Avg.Risk  8.0    6.1      Narrative    Performed at:  01 - [facility] [address], [place], [place]  [id number]  : Sonny Collins MD, Phone:  [phone]   CBC with Diff/Plt ([facility])   Result Value Ref Range    WBC x10/cmm 5.6 3.8 - 11.0 x10/cmm    % Lymphocytes 30.5 20.5  - 51.1 %    % Monocytes 8.4 1.7 - 9.3 %    % Granulocytes 61.1 42.2 - 75.2 %    RBC x10/cmm 4.87 4.2 - 5.9 x10/cmm    Hemoglobin 15.0 13.4 - 17.5 g/dl    Hematocrit 42.3 39 - 51 %    MCV 86.9 80 - 100 fL    MCH 30.9 27.0 - 31.0 pg    MCHC 35.5 33.0 - 37.0 g/dL    Platelet Count 202 140 - 450 K/uL   PSA Serum ([facility])   Result Value Ref Range    PSA NG/ML 1.0 0.0 - 4.0 ng/mL      Comment:      Roche ECLIA methodology.  According to the American Urological Association, Serum PSA should  decrease and remain at undetectable levels after radical  prostatectomy. The AUA defines biochemical recurrence as an initial  PSA value 0.2 ng/mL or greater followed by a subsequent confirmatory  PSA value 0.2 ng/mL or greater.  Values obtained with different assay methods or kits cannot be used  interchangeably. Results cannot be interpreted as absolute evidence  of the presence or absence of malignant disease.      Narrative    Performed at:  01 - [facility] [address], [place], [place]  [id number]  : Sonny Collins MD, Phone:  [phone]       If you have any questions or concerns, please call the clinic at the number listed above.       Sincerely,        El Wray MD

## 2020-07-06 NOTE — PATIENT INSTRUCTIONS
"  Patient Education   Personalized Prevention Plan  You are due for the preventive services outlined below.  Your care team is available to assist you in scheduling these services.  If you have already completed any of these items, please share that information with your care team to update in your medical record.  Health Maintenance Due   Topic Date Due     Zoster (Shingles) Vaccine (1 of 2) 11/13/2003     Discuss Advance Care Planning  11/06/2017     Pneumococcal Vaccine (1 of 2 - PCV13) 11/13/2018     AORTIC ANEURYSM SCREENING (SYSTEM ASSIGNED)  11/13/2018     PHQ-2  01/01/2020     Annual Wellness Visit  03/19/2020     FALL RISK ASSESSMENT  03/19/2020         Doxazosin: Drug information  Access [employer] Online here.  Copyright 4674-9712 [employer] All rights reserved.  (For additional information see \"Doxazosin: Patient drug information\" and see \"Doxazosin: Pediatric drug information\")    For abbreviations and symbols that may be used in [employer] (show table)  Brand Names: US   Cardura;   Cardura XL    Brand Names: Roberta   APO-Doxazosin;   Cardura-1 [DSC];   Cardura-2 [DSC];   Cardura-4 [DSC];   DOM-Doxazosin;   Doxazosin-1 [DSC];   Doxazosin-2 [DSC];   Doxazosin-4 [DSC];   JAMP-Doxazosin;   MYLAN-Doxazosin [DSC];   PMS-Doxazosin;   TEVA-Doxazosin    Pharmacologic Category   Alpha 1 Blocker;   Antihypertensive    Dosing: Adult  BPH: Oral:  Immediate release: Initial: 1 mg once daily; may titrate (by doubling the daily dose [eg, to 2 mg, then 4 mg, then 8 mg]) at 1- to 2-week intervals up to 8 mg once daily based on patient response and tolerability (maximum: 8 mg/day).  Reinitiation of therapy: If therapy is discontinued for several days, restart at 1 mg dose and titrate using initial dosing regimen.  Extended release: 4 mg once daily; may titrate based on response and tolerability every 3 to 4 weeks to 8 mg once daily (maximum: 8 mg/day).  Reinitiation of therapy: If therapy is discontinued for several " days, restart at 4 mg dose and titrate using initial dosing regimen.  Note: Conversion to extended release from immediate release: Omit final evening dose of immediate release prior to starting morning dosing with extended release product; initiate extended release product using 4 mg once daily.  Hypertension (alternative agent): Oral: Immediate release: Initial: 1 mg once daily; titrate as needed based on patient response up to a dose of 16 mg once daily (ACC/AHA [[place] 2017]); maximum: 16 mg/day.

## 2020-07-06 NOTE — PROGRESS NOTES
"  SUBJECTIVE:   Abdiaziz Kimball is a 64 year old male who presents for Preventive Visit.    Are you in the first 12 months of your Medicare Part B coverage?  No    Physical Health:    In general, how would you rate your overall physical health? Fair    Outside of work, how many days during the week do you exercise? 4-5 days/week    Outside of work, approximately how many minutes a day do you exercise?15-30 minutes    If you drink alcohol do you typically have >3 drinks per day or >7 drinks per week? No    Do you usually eat at least 4 servings of fruit and vegetables a day, include whole grains & fiber and avoid regularly eating high fat or \"junk\" foods? Yes    Do you have any problems taking medications regularly?  No    Do you have any side effects from medications? none    Needs assistance for the following daily activities: no assistance needed    Which of the following safety concerns are present in your home?  none identified     Hearing impairment: No    In the past 6 months, have you been bothered by leaking of urine? no    Mental Health:    In general, how would you rate your overall mental or emotional health? good  PHQ-2 Score:      Do you feel safe in your environment? Yes    Have you ever done Advance Care Planning? (For example, a Health Directive, POLST, or a discussion with a medical provider or your loved ones about your wishes): Yes, patient states has an Advance Care Planning document and will bring a copy to the clinic.    Additional concerns to address?  No    Fall risk:None       Cognitive Screenin) Repeat 3 items (Leader, Season, Table)    2) Clock draw: NORMAL  3) 3 item recall: Recalls 3 objects  Results: 3 items recalled: COGNITIVE IMPAIRMENT LESS LIKELY    Mini-CogTM Copyright BILL [employer]. Licensed by the author for use in [employer]; reprinted with permission (libby@.[facility]). All rights reserved.      Daytime drowsiness  Reviewed and updated as needed this visit by " clinical staff  Tobacco  Meds       he has Hypertension which is currently not well controlled. he has been compliant with his medications and is here today to follow up on the this issue and see if we can't work on better control of the blood pressure.    Reviewed last 6 BP readings in chart:  BP Readings from Last 6 Encounters:   07/06/20 (!) 144/90   10/01/19 138/88   04/06/19 (!) 152/94   03/19/19 126/80   05/14/18 122/78   03/05/18 108/72     he  has not experienced any significant side effects from current medications for hypertension.    NO active cardiac complaints or symptoms with exercise.    Itchy a lot.  Lisinopril made itchy in the past, moving annoying scratchy all over the body.    Swelling in the legs, and wears compression stockings daily.  See's vein specialist twice and no interventions planned.    Carpal tunnel and shoulders are in need of intervention.  Wishes could get sooner but with COVID unable to to proceed.      Reviewed and updated as needed this visit by Provider        Social History     Tobacco Use     Smoking status: Never Smoker     Smokeless tobacco: Never Used   Substance Use Topics     Alcohol use: Yes     Alcohol/week: 0.0 - 1.0 standard drinks     Comment: ocassional                           Current providers sharing in care for this patient include:   Patient Care Team:  El Wray MD as PCP - General (Family Practice)  Karan Villatoro MD as Assigned PCP    The following health maintenance items are reviewed in Epic and correct as of today:  Health Maintenance   Topic Date Due     ZOSTER IMMUNIZATION (1 of 2) 11/13/2003     ADVANCE CARE PLANNING  11/06/2017     PNEUMOCOCCAL IMMUNIZATION 65+ LOW/MEDIUM RISK (1 of 2 - PCV13) 11/13/2018     AORTIC ANEURYSM SCREENING (SYSTEM ASSIGNED)  11/13/2018     PHQ-2  01/01/2020     MEDICARE ANNUAL WELLNESS VISIT  03/19/2020     FALL RISK ASSESSMENT  03/19/2020     INFLUENZA VACCINE (1) 09/01/2020     COLORECTAL CANCER  "SCREENING  01/06/2021     LIPID  03/11/2024     DTAP/TDAP/TD IMMUNIZATION (2 - Td) 12/31/2025     HEPATITIS C SCREENING  Completed     IPV IMMUNIZATION  Aged Out     MENINGITIS IMMUNIZATION  Aged Out     Patient Active Problem List   Diagnosis     HTN (hypertension)     Hypokalemia     Foot pain     Advanced directives, counseling/discussion     Health Care Home     Dry skin     Varicose veins of both legs with edema     Encounter for long-term (current) use of high-risk medication     Benign prostatic hyperplasia with nocturia     Past Surgical History:   Procedure Laterality Date     ARTHROSCOPY KNEE RT/LT Left 1990's     ARTHROSCOPY KNEE RT/LT Left      ARTHROSCOPY KNEE RT/LT Left     Leon, Mark     ARTHROSCOPY KNEE RT/LT Left 2003ish    Leon, Mark     ARTHROSCOPY KNEE RT/LT Right 2005    Mark Quintero     HERNIA REPAIR, INGUINAL RT/LT Right 2004    Incarerated hernia with resection, Dr. Moraes     VASECTOMY  1988       Social History     Tobacco Use     Smoking status: Never Smoker     Smokeless tobacco: Never Used   Substance Use Topics     Alcohol use: Yes     Alcohol/week: 0.0 - 1.0 standard drinks     Comment: ocassional     No family history on file.      Pneumonia Vaccine: not ready yet.    ROS:  Constitutional, HEENT, cardiovascular, pulmonary, GI, , musculoskeletal, neuro, skin, endocrine and psych systems are negative, except as otherwise noted.    OBJECTIVE:   BP (!) 144/90   Pulse 74   Temp 98.2  F (36.8  C)   Resp 16   Ht 1.765 m (5' 9.5\")   Wt 86.2 kg (190 lb)   SpO2 97%   BMI 27.66 kg/m   Estimated body mass index is 27.66 kg/m  as calculated from the following:    Height as of this encounter: 1.765 m (5' 9.5\").    Weight as of this encounter: 86.2 kg (190 lb).  EXAM:   GENERAL: healthy, alert and no distress  EYES: Eyes grossly normal to inspection, PERRL and conjunctivae and sclerae normal  HENT: ear canals and TM's normal, nose and mouth without ulcers or lesions  NECK: " no adenopathy, no asymmetry, masses, or scars and thyroid normal to palpation  RESP: lungs clear to auscultation - no rales, rhonchi or wheezes  CV: regular rate and rhythm, normal S1 S2, no S3 or S4, no murmur, click or rub, no peripheral edema and peripheral pulses strong  ABDOMEN: soft, nontender, no hepatosplenomegaly, no masses and bowel sounds normal  MS: no gross musculoskeletal defects noted, no edema  SKIN: no suspicious lesions or rashes  NEURO: Normal strength and tone, mentation intact and speech normal  PSYCH: mentation appears normal, affect normal/bright    Diagnostic Test Results:  Labs reviewed in Epic    ASSESSMENT / PLAN:   Abdiaziz was seen today for physical.    Diagnoses and all orders for this visit:    Encounter for Medicare annual wellness exam    Varicose veins of both legs with edema  Continued compression stocking     Benign prostatic hyperplasia with nocturia  Considering doxasosin for treatment    Essential hypertension  Discussed current hypertension treatment guidelines, including indications for treatment and treatment options.  Discussed the importance for aggressive management of HTN to prevent vascular complications later.  Recommended lower fat, lower carbohydrate, and lower sodium (<2000 mg)diet.  Discussed required intervals for follow up on HTN, lab studies.  Recommened pt. follow their blood pressures outside the clinic to ensure that BPs are remaining within guidelines, and to contact me if the readings are not within guidelines on a regular basis so we can adjust treatment as needed.    -     CBC with Diff/Plt ([facility])    Screening for prostate cancer  -     PSA Serum ([facility])    Screening for cardiovascular condition  -     Comp. Metabolic Panel (14) ([facility])  -     Lipid Panel ([facility])        COUNSELING:  Reviewed preventive health counseling, as reflected in patient instructions       Vision screening       Hearing screening    Estimated body mass index is 27.66 kg/m  as  "calculated from the following:    Height as of this encounter: 1.765 m (5' 9.5\").    Weight as of this encounter: 86.2 kg (190 lb).         reports that he has never smoked. He has never used smokeless tobacco.      Appropriate preventive services were discussed with this patient, including applicable screening as appropriate for cardiovascular disease, diabetes, osteopenia/osteoporosis, and glaucoma.  As appropriate for age/gender, discussed screening for colorectal cancer, prostate cancer, breast cancer, and cervical cancer. Checklist reviewing preventive services available has been given to the patient.    Reviewed patients plan of care and provided an AVS. The Basic Care Plan (routine screening as documented in Health Maintenance) for Abdiaziz meets the Care Plan requirement. This Care Plan has been established and reviewed with the Patient.    Counseling Resources:  ATP IV Guidelines  Pooled Cohorts Equation Calculator  Breast Cancer Risk Calculator  FRAX Risk Assessment  ICSI Preventive Guidelines  Dietary Guidelines for Americans, 2010  USDA's MyPlate  ASA Prophylaxis  Lung CA Screening    El Wray MD  [facility]  "

## 2020-07-07 LAB
ALBUMIN SERPL-MCNC: 4.4 G/DL (ref 3.8–4.8)
ALBUMIN/GLOB SERPL: 2 {RATIO} (ref 1.2–2.2)
ALP SERPL-CCNC: 76 IU/L (ref 39–117)
ALT SERPL-CCNC: 12 IU/L (ref 0–44)
AST SERPL-CCNC: 18 IU/L (ref 0–40)
BILIRUB SERPL-MCNC: 0.7 MG/DL (ref 0–1.2)
BUN SERPL-MCNC: 22 MG/DL (ref 8–27)
BUN/CREATININE RATIO: 24 (ref 10–24)
CALCIUM SERPL-MCNC: 8.7 MG/DL (ref 8.6–10.2)
CHLORIDE SERPLBLD-SCNC: 106 MMOL/L (ref 96–106)
CHOLEST SERPL-MCNC: 184 MG/DL (ref 100–199)
CREAT SERPL-MCNC: 0.9 MG/DL (ref 0.76–1.27)
EGFR IF AFRICN AM: 103 ML/MIN/1.73
EGFR IF NONAFRICN AM: 89 ML/MIN/1.73
GLOBULIN, TOTAL: 2.2 G/DL (ref 1.5–4.5)
GLUCOSE SERPL-MCNC: 92 MG/DL (ref 65–99)
HDLC SERPL-MCNC: 37 MG/DL
LDL/HDL RATIO: 3.4 RATIO (ref 0–3.6)
LDLC SERPL CALC-MCNC: 126 MG/DL (ref 0–99)
POTASSIUM SERPL-SCNC: 3.6 MMOL/L (ref 3.5–5.2)
PROT SERPL-MCNC: 6.6 G/DL (ref 6–8.5)
PSA NG/ML: 1 NG/ML (ref 0–4)
SODIUM SERPL-SCNC: 143 MMOL/L (ref 134–144)
TOTAL CO2: 27 MMOL/L (ref 20–29)
TRIGL SERPL-MCNC: 107 MG/DL (ref 0–149)
VLDLC SERPL CALC-MCNC: 21 MG/DL (ref 5–40)

## 2020-07-07 NOTE — RESULT ENCOUNTER NOTE
Dear Abdiaziz,   I am writing to report that your included test results are within expected ranges.  El Wray M.D.

## 2020-07-21 DIAGNOSIS — I10 ESSENTIAL HYPERTENSION: ICD-10-CM

## 2020-07-21 RX ORDER — HYDROCHLOROTHIAZIDE 12.5 MG/1
1 CAPSULE ORAL DAILY
Qty: 90 CAPSULE | Refills: 0 | Status: SHIPPED | OUTPATIENT
Start: 2020-07-21 | End: 2020-08-11 | Stop reason: ALTCHOICE

## 2020-08-11 ENCOUNTER — TELEPHONE (OUTPATIENT)
Dept: FAMILY MEDICINE | Facility: CLINIC | Age: 65
End: 2020-08-11

## 2020-08-11 DIAGNOSIS — I10 BENIGN ESSENTIAL HYPERTENSION: Primary | ICD-10-CM

## 2020-08-11 RX ORDER — CHLORTHALIDONE 25 MG/1
25 TABLET ORAL DAILY
Qty: 90 TABLET | Refills: 0 | Status: SHIPPED | OUTPATIENT
Start: 2020-08-11 | End: 2020-08-25

## 2020-08-11 NOTE — TELEPHONE ENCOUNTER
----- Message from Maggie Pascal RN sent at 8/11/2020 11:15 AM CDT -----  Regarding: FW: BP med    ----- Message -----  From: El Wray MD  Sent: 8/11/2020   9:08 AM CDT  To: Maggie Pascal RN  Subject: RE: BP med                                       I would change from hydrochlorothiazide to Chlorthalidone 25 mg daily. Let him know that is a more effective diuretic agent for htn.    KN  ----- Message -----  From: Maggie Pascal RN  Sent: 8/10/2020   5:42 PM CDT  To: El Wray MD  Subject: BP med                                           Dr. Wray - patient called saying you had mentioned at his cpx 7/6/20 wanting to put him on a BP med. He is ready to proceed with this and rx be sent to [facility].  I can send it if you tell me what you want and when you want to see him again.   Looks like has been on amlodipine 2.5 QD and HCTZ 12.5qd since pre-EPIC in 2011.     BP Readings from Last 6 Encounters:  07/06/20 : (!) 144/90  10/01/19 : 138/88  04/06/19 : (!) 152/94  03/19/19 : 126/80  05/14/18 : 122/78  03/05/18 : 108/72     Current Outpatient Medications:  amLODIPine (NORVASC) 2.5 MG tablet  Coenzyme Q10 (CO Q-10) 200 MG CAPS  Glucosamine-Chondroit-Vit C-Mn (GLUCOSAMINE CHONDROITINOITIN COMPLEX) CAPS  hydrochlorothiazide (MICROZIDE) 12.5 MG capsule  Multiple Vitamins-Minerals (QC MULTI-DIMA 50 & OVER PO)  naproxen (NAPROSYN) 500 MG tablet  nystatin-triamcinolone (MYCOLOG) ointment  Omega-3 Fatty Acids (FISH OIL) 1000 MG CPDR  potassium chloride ER (KLOR-CON) 10 MEQ CR tablet  valACYclovir (VALTREX) 1000 mg tablet  vitamin B complex with vitamin C (VITAMIN  B COMPLEX) TABS tablet    No current facility-administered medications for this visit.       Thanks  Maggie

## 2020-08-11 NOTE — TELEPHONE ENCOUNTER
Changed patients prescription from hydrochlorothiazide to Chlorthalidone.  Called patient to inform him of change. He will come back in 2 weeks for BMP.

## 2020-08-14 NOTE — TELEPHONE ENCOUNTER
Patient called clinic today to report new rash on legs, increase in itching and new small blister on wrist. Patient feels these are side effects to new medication chlorthalidone 25mg. Patient has been taking new medication since 8/11/20. Advised patient to discontinue medication over weekend and restart hydrochlorothiazide 12.5mg that he was previously taking. Discussed with patient what symptoms would require emergency evaluation, he verbalized understanding. Call routed to Dr. Wray for review and will f/u with patient on 8/17/20. Yamilka Rios

## 2020-08-20 NOTE — TELEPHONE ENCOUNTER
Discussed with Dr. Wray who recommends that patient see Sierra Vasquez to discuss BP medication options. Referral entered and patient notified of this. He is advised to continue to monitor BP and record, and to continue to take hydrochlorothiazide. Yamilka Rios

## 2020-08-25 ENCOUNTER — CLINICAL UPDATE (OUTPATIENT)
Dept: PHARMACY | Facility: PHYSICIAN GROUP | Age: 65
End: 2020-08-25
Payer: COMMERCIAL

## 2020-08-25 DIAGNOSIS — I10 HTN (HYPERTENSION): Primary | ICD-10-CM

## 2020-08-25 DIAGNOSIS — E87.6 HYPOKALEMIA: ICD-10-CM

## 2020-08-25 DIAGNOSIS — I10 ESSENTIAL HYPERTENSION: ICD-10-CM

## 2020-08-25 PROCEDURE — 99207 ZZC NO CHARGE LOS: CPT | Performed by: PHARMACIST

## 2020-08-25 RX ORDER — AMLODIPINE BESYLATE 2.5 MG/1
2.5 TABLET ORAL AT BEDTIME
COMMUNITY
Start: 2020-08-25 | End: 2020-12-27

## 2020-08-25 RX ORDER — HYDROCHLOROTHIAZIDE 12.5 MG/1
25 CAPSULE ORAL EVERY MORNING
COMMUNITY
Start: 2020-08-07 | End: 2020-10-29

## 2020-08-25 NOTE — PROGRESS NOTES
Clinical Pharmacy Consult:                                                    Abdiaziz Kimball is a 64 year old male called for a clinical pharmacist consult.  He was referred to me from [facility].     Reason for Consult: Supplement questions for conflicts with medication, does not have coverage for MTM at this time. Private pay options discussed for future if interested.    Plan:  1. For next week move amlodipine 2.5mg to bedtime.   2. If blood pressure running stage 1 area >140/90mmHg- double hydrochlorothiazide to 25mg daily in the morning.   3. Schedule Lab only at [place] 2 weeks later to check Potassium on higher hydrochlorothiazide dose.   4. Hold fish oil for 3 months, we can recheck fasting cholesterol check to see if you need to take any of these products, stopping them may help lower the LDL level.     Recent Labs   Lab Test 07/06/20  1005 03/11/19  0820   CHOL 184 168   HDL 37* 44   * 110*   TRIG 107 68             Discussion: see images below for what patient provided to Pharm D. Reviewed supplements safety/efficacy data limitations. Home BP readings on current regimen are mostly 140-150/90-100mmHg, but occasionally <140/90mmHg, rarely over 150.   He is worried about potassium levels. Last checked WNL on current regimen.

## 2020-08-25 NOTE — LETTER
Varun,     It was great to speak with you today and I hope I was able to answer your questions related to your medications/supplements.     Here is a summary of what we discussed:     Plan:  1. For next week move amlodipine 2.5mg to bedtime.     2. If blood pressure running stage 1 area >140/90mmHg- double hydrochlorothiazide to 25mg daily in the morning.     3. Schedule Lab only at [place] 2 weeks later to check Potassium on higher hydrochlorothiazide dose.     4. Hold fish oil for 3 months, we can recheck fasting cholesterol check to see if you need to take any of these products, stopping them may help lower the LDL level.     5. [facility] or [facility] are two labs that review supplement products for including what they say they do.       Please call the clinic with any questions or concerns!    Thank you,   Sierra Dove, Pharm.D, [facility]  Medication Therapy Management Pharmacist

## 2020-09-11 ENCOUNTER — TELEPHONE (OUTPATIENT)
Dept: FAMILY MEDICINE | Facility: CLINIC | Age: 65
End: 2020-09-11

## 2020-09-11 NOTE — TELEPHONE ENCOUNTER
"Patient called clinic with update on BP. He reports he has been taking amlodipine 2.5mg QPM and has been taking 25mg hydrochlorothiazide and BP's continue to be 130-150's/80's. Discussed with Sierra Vasquez who wants patient to return to clinic for BMP to determine next step. Patient notified and scheduled for lab only appointment 9/14/20. Patient also requests fasting lipids check as he has \"been eating a high potassium diet\". Yamilka Rios   "

## 2020-09-14 VITALS — TEMPERATURE: 97.2 F | SYSTOLIC BLOOD PRESSURE: 122 MMHG | DIASTOLIC BLOOD PRESSURE: 84 MMHG

## 2020-09-14 DIAGNOSIS — I10 HTN (HYPERTENSION): ICD-10-CM

## 2020-09-14 DIAGNOSIS — Z13.6 SCREENING FOR CARDIOVASCULAR CONDITION: Primary | ICD-10-CM

## 2020-09-14 DIAGNOSIS — E87.6 HYPOKALEMIA: ICD-10-CM

## 2020-09-14 PROCEDURE — 36415 COLL VENOUS BLD VENIPUNCTURE: CPT | Performed by: FAMILY MEDICINE

## 2020-09-14 PROCEDURE — 80048 BASIC METABOLIC PNL TOTAL CA: CPT | Mod: 90 | Performed by: FAMILY MEDICINE

## 2020-09-14 NOTE — NURSING NOTE
C/o leg cramps, loud sounding pulse in heart while in bed,  Floating muscle aches in all various parts of body  Cant exersize due to rapid heart beat, cant ride a bike  Walking up stairs is a chore  Holding supplements since end of august  Angelique Camilo MA September 14, 2020 9:49 AM

## 2020-09-15 LAB
BUN SERPL-MCNC: 22 MG/DL (ref 8–27)
BUN/CREATININE RATIO: 23 (ref 10–24)
CALCIUM SERPL-MCNC: 8.9 MG/DL (ref 8.6–10.2)
CHLORIDE SERPLBLD-SCNC: 103 MMOL/L (ref 96–106)
CHOLEST SERPL-MCNC: 170 MG/DL (ref 100–199)
CREAT SERPL-MCNC: 0.95 MG/DL (ref 0.76–1.27)
EGFR IF AFRICN AM: 96 ML/MIN/1.73
EGFR IF NONAFRICN AM: 83 ML/MIN/1.73
GLUCOSE SERPL-MCNC: 91 MG/DL (ref 65–99)
HDLC SERPL-MCNC: 43 MG/DL
LDL/HDL RATIO: 2.5 RATIO (ref 0–3.6)
LDLC SERPL CALC-MCNC: 106 MG/DL (ref 0–99)
POTASSIUM SERPL-SCNC: 3.2 MMOL/L (ref 3.5–5.2)
SODIUM SERPL-SCNC: 143 MMOL/L (ref 134–144)
TOTAL CO2: 27 MMOL/L (ref 20–29)
TRIGL SERPL-MCNC: 114 MG/DL (ref 0–149)
VLDLC SERPL CALC-MCNC: 21 MG/DL (ref 5–40)

## 2020-09-15 RX ORDER — POTASSIUM CHLORIDE 1500 MG/1
20 TABLET, EXTENDED RELEASE ORAL DAILY
Qty: 30 TABLET | Refills: 0 | Status: SHIPPED | OUTPATIENT
Start: 2020-09-15 | End: 2020-10-29

## 2020-09-15 NOTE — PROGRESS NOTES
He spoke with norma the other day who addressed his concerns at that time too- We are waiting on his BMP results as this will tell us if the medication is doing anything particular to his electrolytes,  but if he is having that many concerns about it he can set up to see a provider if needed or wait until results and then see provider if still concerned.     Sierra Dove, Pharm.D, [facility]   Medication Therapy Management Pharmacist

## 2020-10-29 DIAGNOSIS — E87.6 HYPOKALEMIA: ICD-10-CM

## 2020-10-29 RX ORDER — POTASSIUM CHLORIDE 1500 MG/1
20 TABLET, EXTENDED RELEASE ORAL DAILY
Qty: 90 TABLET | Refills: 1 | Status: SHIPPED | OUTPATIENT
Start: 2020-10-29 | End: 2020-12-16

## 2020-10-29 RX ORDER — HYDROCHLOROTHIAZIDE 12.5 MG/1
25 CAPSULE ORAL EVERY MORNING
Qty: 180 CAPSULE | Refills: 3 | Status: SHIPPED | OUTPATIENT
Start: 2020-10-29 | End: 2020-12-14

## 2020-11-06 ENCOUNTER — OFFICE VISIT (OUTPATIENT)
Dept: FAMILY MEDICINE | Facility: CLINIC | Age: 65
End: 2020-11-06

## 2020-11-06 VITALS
OXYGEN SATURATION: 97 % | WEIGHT: 190 LBS | BODY MASS INDEX: 27.66 KG/M2 | DIASTOLIC BLOOD PRESSURE: 83 MMHG | HEART RATE: 71 BPM | SYSTOLIC BLOOD PRESSURE: 133 MMHG | TEMPERATURE: 98.5 F

## 2020-11-06 DIAGNOSIS — M79.672 PAIN IN BOTH FEET: Primary | ICD-10-CM

## 2020-11-06 DIAGNOSIS — Z13.6 SCREENING FOR AAA (ABDOMINAL AORTIC ANEURYSM): ICD-10-CM

## 2020-11-06 DIAGNOSIS — I83.893 VARICOSE VEINS OF BOTH LEGS WITH EDEMA: ICD-10-CM

## 2020-11-06 DIAGNOSIS — E87.6 HYPOKALEMIA: ICD-10-CM

## 2020-11-06 DIAGNOSIS — M79.671 PAIN IN BOTH FEET: Primary | ICD-10-CM

## 2020-11-06 DIAGNOSIS — I10 BENIGN ESSENTIAL HYPERTENSION: ICD-10-CM

## 2020-11-06 PROCEDURE — 99214 OFFICE O/P EST MOD 30 MIN: CPT | Performed by: FAMILY MEDICINE

## 2020-11-06 PROCEDURE — 93050 ART PRESSURE WAVEFORM ANALYS: CPT | Performed by: FAMILY MEDICINE

## 2020-11-06 RX ORDER — HYDROCHLOROTHIAZIDE 25 MG/1
25 TABLET ORAL DAILY
Qty: 90 TABLET | Refills: 3 | Status: SHIPPED | OUTPATIENT
Start: 2020-11-06 | End: 2020-12-14

## 2020-11-06 NOTE — PROGRESS NOTES
"Essential Hypertension   Remains well controlled when checked out of clinic.   he has not experienced any significant side effects from medications for hypertension.    NO active cardiac complaints or symptoms with exercise.  Current medications for treatment:  Diuretic thiazide (chlorthalidone, hydrochlorothiazide, indapamide and Calcium channel blocker (Amlodipine, Diltiazem,Verapamil)    Reviewed last 6 BP readings in chart:  BP Readings from Last 6 Encounters:   11/06/20 133/83   09/14/20 122/84   07/06/20 (!) 144/90   10/01/19 138/88   04/06/19 (!) 152/94   03/19/19 126/80       Arthritis pain in the feet and varicosities. And gets \"blue\"    Problem(s) Oriented visit      ROS:  General and Resp. completed and negative except as noted above     HISTORY:   reports current alcohol use.   reports that he has never smoked. He has never used smokeless tobacco.    Past Medical History:   Diagnosis Date     Dry skin 12/4/2014     Foot pain 11/6/2012     HTN (hypertension)      HTN, goal below 140/90      Hyperlipidaemia LDL goal < 100      Hypokalemia 11/17/2011     Renal cyst 01/25/2008     Past Surgical History:   Procedure Laterality Date     ARTHROSCOPY KNEE RT/LT Left 1990's     ARTHROSCOPY KNEE RT/LT Left      ARTHROSCOPY KNEE RT/LT Left     Mark Quintero     ARTHROSCOPY KNEE RT/LT Left 2003ish    Mark Quintero     ARTHROSCOPY KNEE RT/LT Right 2005    Mark Quintero     HERNIA REPAIR, INGUINAL RT/LT Right 2004    Incarerated hernia with resection, Dr. Moraes     VASECTOMY  1988       EXAM:  BP: 133/83[AtCor[   Pulse: 71    Temp: 98.5    Wt Readings from Last 2 Encounters:   11/06/20 86.2 kg (190 lb)   07/06/20 86.2 kg (190 lb)       BMI= Body mass index is 27.66 kg/m .    EXAM:  APPEARANCE: = Relaxed and in no distress  SKIN: no suspicious lesions or rashes and foot varicosties  Recent/Remote Memory = Alert and Oriented x 3  Mood/Affect = Cooperative and interested      Assessment/Plan:  Abdiaziz was seen " "today for recheck and musculoskeletal problem.    Diagnoses and all orders for this visit:    HTN (hypertension)  -     [place] ART PRESS WAVEFORM ANALYS CENTRAL ART PRESSURE    Pain in both feet    Screening for AAA (abdominal aortic aneurysm)  -     Abdominal Aortic Aneurysm Screening/Tracking  -     Abdominal Aortic Aneurysm Screening/Tracking    Hypokalemia    Varicose veins of both legs with edema    Benign essential hypertension  Reviewed his current HTN management. Discussed our goal for him is a systolic pressure at or below 128 and diastolic pressure at or below 83.  We today managed his prescriptions with refills ensured to ensure availabilty of current medications.  Discussed the importance for aggressive management of HTN to prevent vascular complications later.  Recommended lower fat, lower carbohydrate, and lower sodium (<2000 mg)diet. Required intervals for follow up on HTN, lab studies reviewed.    Strongly recommened he follow his blood pressures outside the clinic to ensure that BPs are remaining within guidelines,.  Instructed to contact me if the readings are not within guidelines on a regular basis so we can adjust treatment as needed.    -     hydrochlorothiazide (HYDRODIURIL) 25 MG tablet; Take 1 tablet (25 mg) by mouth daily        COUNSELING:   reports that he has never smoked. He has never used smokeless tobacco.    Estimated body mass index is 27.66 kg/m  as calculated from the following:    Height as of 7/6/20: 1.765 m (5' 9.5\").    Weight as of this encounter: 86.2 kg (190 lb).       Appropriate preventive services were discussed with this patient, including applicable screening as appropriate for cardiovascular disease, diabetes, osteopenia/osteoporosis, and glaucoma.  As appropriate for age/gender, discussed screening for colorectal cancer, prostate cancer, breast cancer, and cervical cancer. Checklist reviewing preventive services available has been given to the patient.    Reviewed " patients plan of care and provided an AVS. The Basic Care Plan (routine screening as documented in Health Maintenance) for Abdiaziz meets the Care Plan requirement. This Care Plan has been established and reviewed with the  Patient.      The following health maintenance items are reviewed in Epic and correct as of today:  Health Maintenance   Topic Date Due     ZOSTER IMMUNIZATION (1 of 2) 11/13/2003     FALL RISK ASSESSMENT  03/19/2020     INFLUENZA VACCINE (1) 09/01/2020     COLORECTAL CANCER SCREENING  01/06/2021     MEDICARE ANNUAL WELLNESS VISIT  07/06/2021     ADVANCE CARE PLANNING  07/06/2025     LIPID  09/14/2025     DTAP/TDAP/TD IMMUNIZATION (2 - Td) 12/31/2025     HEPATITIS C SCREENING  Completed     PHQ-2  Completed     AORTIC ANEURYSM SCREENING (SYSTEM ASSIGNED)  Completed     Pneumococcal Vaccine: Pediatrics (0 to 5 Years) and At-Risk Patients (6 to 64 Years)  Aged Out     IPV IMMUNIZATION  Aged Out     MENINGITIS IMMUNIZATION  Aged Out     HEPATITIS B IMMUNIZATION  Aged Out     Pneumococcal Vaccine: 65+ Years  Discontinued       El Wray  [facility]  For any issues my office # is [phone]

## 2020-12-04 ENCOUNTER — TELEPHONE (OUTPATIENT)
Dept: FAMILY MEDICINE | Facility: CLINIC | Age: 65
End: 2020-12-04

## 2020-12-04 DIAGNOSIS — I10 HTN (HYPERTENSION): Primary | ICD-10-CM

## 2020-12-04 DIAGNOSIS — E87.6 HYPOKALEMIA: ICD-10-CM

## 2020-12-04 NOTE — TELEPHONE ENCOUNTER
Patient called clinic requesting change to HTN medication. Patient reports that he is currently takinmg hydrochlorothiazide QAM  2.5mg amlodipine BID  20mEq K BID  He is wishing to decrease potassium to once daily and add an additional supplement. Discussed with Sierra Vasquez and Dr. Wray who wish to recheck BMP before medication changes. Patient is scheduled for lab 20, lab orders entered. Yamilka Rios

## 2020-12-07 VITALS — DIASTOLIC BLOOD PRESSURE: 84 MMHG | SYSTOLIC BLOOD PRESSURE: 144 MMHG | TEMPERATURE: 97.8 F

## 2020-12-07 DIAGNOSIS — I10 HTN (HYPERTENSION): ICD-10-CM

## 2020-12-07 PROCEDURE — 36415 COLL VENOUS BLD VENIPUNCTURE: CPT | Performed by: FAMILY MEDICINE

## 2020-12-08 LAB
BUN SERPL-MCNC: 21 MG/DL (ref 8–27)
BUN/CREATININE RATIO: 21 (ref 10–24)
CALCIUM SERPL-MCNC: 9.2 MG/DL (ref 8.6–10.2)
CHLORIDE SERPLBLD-SCNC: 104 MMOL/L (ref 96–106)
CREAT SERPL-MCNC: 1 MG/DL (ref 0.76–1.27)
EGFR IF AFRICN AM: 90 ML/MIN/1.73
EGFR IF NONAFRICN AM: 78 ML/MIN/1.73
GLUCOSE SERPL-MCNC: 93 MG/DL (ref 65–99)
POTASSIUM SERPL-SCNC: 3.4 MMOL/L (ref 3.5–5.2)
SODIUM SERPL-SCNC: 143 MMOL/L (ref 134–144)
TOTAL CO2: 30 MMOL/L (ref 20–29)

## 2020-12-14 RX ORDER — TRIAMTERENE AND HYDROCHLOROTHIAZIDE 37.5; 25 MG/1; MG/1
1 CAPSULE ORAL DAILY
Qty: 90 CAPSULE | Refills: 0 | Status: SHIPPED | OUTPATIENT
Start: 2020-12-14 | End: 2021-01-31

## 2020-12-14 NOTE — TELEPHONE ENCOUNTER
Discussed recent labs and BP's with Dr. Wray and Sierra [facility]. Recommendation is to discontinue hydrochlorothiazide and start Triamterne-hydrochlorothiazide 37.5-25mg once daily and then return to clinic in 1 month to recheck labs. Prescription sent to pharmacy and patient notified. Yamilka Rios

## 2020-12-16 RX ORDER — POTASSIUM CHLORIDE 1500 MG/1
10 TABLET, EXTENDED RELEASE ORAL DAILY
Qty: 90 TABLET | Refills: 1 | COMMUNITY
Start: 2020-12-16 | End: 2022-01-17

## 2020-12-16 NOTE — TELEPHONE ENCOUNTER
Spoke with patient who reports that he never increased potassium to 20meq BID-he has continued to take 10meq BID. Per Dr. Wray and Sierra [facility] patient to continue taking 10meq BID until after after labs are checked in 1 month. Yamilka Rios

## 2020-12-27 DIAGNOSIS — I10 ESSENTIAL HYPERTENSION: ICD-10-CM

## 2020-12-27 RX ORDER — AMLODIPINE BESYLATE 2.5 MG/1
TABLET ORAL
Qty: 90 TABLET | Refills: 0 | Status: SHIPPED | OUTPATIENT
Start: 2020-12-27 | End: 2021-08-17

## 2021-01-05 ENCOUNTER — OFFICE VISIT (OUTPATIENT)
Dept: FAMILY MEDICINE | Facility: CLINIC | Age: 66
End: 2021-01-05

## 2021-01-05 VITALS
DIASTOLIC BLOOD PRESSURE: 78 MMHG | WEIGHT: 197.5 LBS | SYSTOLIC BLOOD PRESSURE: 138 MMHG | OXYGEN SATURATION: 97 % | BODY MASS INDEX: 27.65 KG/M2 | HEART RATE: 75 BPM | TEMPERATURE: 97.8 F | RESPIRATION RATE: 16 BRPM | HEIGHT: 71 IN

## 2021-01-05 DIAGNOSIS — M79.671 PAIN IN BOTH FEET: ICD-10-CM

## 2021-01-05 DIAGNOSIS — Z01.818 PREOP GENERAL PHYSICAL EXAM: Primary | ICD-10-CM

## 2021-01-05 DIAGNOSIS — I10 ESSENTIAL HYPERTENSION: ICD-10-CM

## 2021-01-05 DIAGNOSIS — M79.672 PAIN IN BOTH FEET: ICD-10-CM

## 2021-01-05 LAB
% GRANULOCYTES: 67.9 % (ref 42.2–75.2)
HCT VFR BLD AUTO: 47 % (ref 39–51)
HEMOGLOBIN: 15.6 G/DL (ref 13.4–17.5)
LYMPHOCYTES NFR BLD AUTO: 25 % (ref 20.5–51.1)
MCH RBC QN AUTO: 29.9 PG (ref 27–31)
MCHC RBC AUTO-ENTMCNC: 33.3 G/DL (ref 33–37)
MCV RBC AUTO: 89.6 FL (ref 80–100)
MONOCYTES NFR BLD AUTO: 7.1 % (ref 1.7–9.3)
PLATELET # BLD AUTO: 231 K/UL (ref 140–450)
RBC # BLD AUTO: 5.24 X10/CMM (ref 4.2–5.9)
WBC # BLD AUTO: 7.3 X10/CMM (ref 3.8–11)

## 2021-01-05 PROCEDURE — 93050 ART PRESSURE WAVEFORM ANALYS: CPT | Performed by: FAMILY MEDICINE

## 2021-01-05 PROCEDURE — 85025 COMPLETE CBC W/AUTO DIFF WBC: CPT | Performed by: FAMILY MEDICINE

## 2021-01-05 PROCEDURE — 99214 OFFICE O/P EST MOD 30 MIN: CPT | Performed by: FAMILY MEDICINE

## 2021-01-05 ASSESSMENT — MIFFLIN-ST. JEOR: SCORE: 1685.04

## 2021-01-05 NOTE — PATIENT INSTRUCTIONS

## 2021-01-05 NOTE — PROGRESS NOTES
[facility] [address] [place] [address]  Phone: [phone]  Fax: [phone]  Primary Provider: Bertin Wray  Pre-op Performing Provider: BERTIN WRAY    PREOPERATIVE EVALUATION:  Today's date: 1/5/2021    Abdiaziz Kimball is a 65 year old male who presents for a preoperative evaluation.    Surgical Information:  Surgery/Procedure: Left Foot repair with   LEFT CHEILECTOMY WITH SOFT TISSUE ARTHROPLASTY, 2ND METATARSAL PLANTAR PLATE REPAIR, WEIL METATARSAL OSTEOTOMY, CALCANEOCUBOID LIGAMENT REPAIR, 5TH METATARSAL LIGAMENT RECONSTRUCTION     Surgery Location: [facility]  Surgeon: Jason  Surgery Date: 1/19/21  Time of Surgery: Noon  Where patient plans to recover: At home alone  Fax number for surgical facility: Note does not need to be faxed, will be available electronically in Epic.    Type of Anesthesia Anticipated: to be determined  Preoperative Questionnaire:   No - Have you ever had a heart attack or stroke?  No - Have you ever had surgery on your heart or blood vessels, such as a stent, coronary (heart) bypass, or surgery on an artery in the head, neck, heart, or legs?  No - Do you have chest pain when you are physically active?  No - Do you have a history of heart failure?  No - Do you currently have a cold, bronchitis, or symptoms of other respiratory (head and chest) infections?  Yes - Do you have a cough, shortness of breath, or wheezing?  No - Do you or anyone in your family have a history of blood clots?  No - Do you or anyone in your family have a serious bleeding problem, such as long-lasting bleeding after surgeries or cuts?  No - Have you ever had anemia or been told to take iron pills?  No - Have you had any abnormal blood loss such as black, tarry or bloody stools, or abnormal vaginal bleeding?  No - Have you ever had a blood transfusion?  Yes - Are you willing to have a blood transfusion if it is medically needed before, during, or  after your surgery?  Yes - Have you or anyone in your family ever had problems with anesthesia (sedation for surgery)? Self  No - Do you have sleep apnea, excessive snoring, or daytime drowsiness?   No - Do you have any artifical heart valves or other implanted medical devices, such as a pacemaker, defibrillator, or continuous glucose monitor?  No - Do you have any artifical joints?  No - Are you allergic to latex?  No - Is there any chance that you may be pregnant?    Subjective     HPI related to upcoming procedure: increasing pain in the left foot      Health Care Directive:  Patient does not have a Health Care Directive or Living Will: Advance Directive received and scanned. Click on Code in the patient header to view.    Preoperative Review of :   reviewed - no record of controlled substances prescribed.      Status of Chronic Conditions:  See problem list for active medical problems.  Problems all longstanding and stable, except as noted/documented.  See ROS for pertinent symptoms related to these conditions.    HYPERTENSION - Patient has longstanding history of HTN , currently denies any symptoms referable to elevated blood pressure. Specifically denies chest pain, palpitations, dyspnea, orthopnea, PND or peripheral edema. Systolic blood pressure readings have not been in normal range. Current medication regimen is as listed below. Patient denies any side effects of medication.       Review of Systems  Constitutional, neuro, ENT, endocrine, pulmonary, cardiac, gastrointestinal, genitourinary, musculoskeletal, integument and psychiatric systems are negative, except as otherwise noted.    Patient Active Problem List    Diagnosis Date Noted     Benign prostatic hyperplasia with nocturia 03/05/2018     Priority: Medium     Encounter for long-term (current) use of high-risk medication 01/31/2018     Priority: Medium     Varicose veins of both legs with edema 06/13/2017     Priority: Medium     Dry skin  12/04/2014     Priority: Medium     Health Care Home 02/04/2014     Priority: Medium     State Tier Level:  Tier 1   December 4, 2014           Foot pain 11/06/2012     Priority: Medium     OA of both feet       Advanced directives, counseling/discussion 11/06/2012     Priority: Medium     As is reasonable care for Most of us, wants in the Short term aggressive care.   No desire for long term prolongation of life through artificial means if no hope to bring back to a reasonable status.          Hypokalemia 11/17/2011     Priority: Medium     HTN (hypertension) 10/14/2011     Priority: Medium      Past Medical History:   Diagnosis Date     Dry skin 12/4/2014     Foot pain 11/6/2012    OA of both feet      HTN (hypertension)     Renal artery US neg for stenosis 2008.     HTN, goal below 140/90      Hyperlipidaemia LDL goal < 100      Hypokalemia 11/17/2011     Renal cyst 01/25/2008    Small. Seen on renal US.     Past Surgical History:   Procedure Laterality Date     ARTHROSCOPY KNEE RT/LT Left 1990's     ARTHROSCOPY KNEE RT/LT Left      ARTHROSCOPY KNEE RT/LT Left     Leon, Mark     ARTHROSCOPY KNEE RT/LT Left 2003ish    Mark Quintero     ARTHROSCOPY KNEE RT/LT Right 2005    Mark Quintero     HERNIA REPAIR, INGUINAL RT/LT Right 2004    Incarerated hernia with resection, Dr. Moraes     VASECTOMY  1988     Current Outpatient Medications   Medication Sig Dispense Refill     amLODIPine (NORVASC) 2.5 MG tablet Take 1 tablet (2.5 mg) by mouth 1 time daily 90 tablet 0     Coenzyme Q10 (CO Q-10) 200 MG CAPS Take  by mouth daily.       NUTRITIONAL SUPPLEMENTS PO Total Restore- 1 daily   Suspended per patient end of Aub 2020       potassium chloride ER (K-TAB) 20 MEQ CR tablet Take 0.5 tablets (10 mEq) by mouth daily 90 tablet 1     triamterene-HCTZ (DYAZIDE) 37.5-25 MG capsule Take 1 capsule by mouth daily 90 capsule 0     Glucosamine-Chondroit-Vit C-Mn (GLUCOSAMINE CHONDROITINOITIN COMPLEX) CAPS Take 1 tablet by  "mouth daily Suspended per patient end of Aub 2020       vitamin B complex with vitamin C (VITAMIN  B COMPLEX) TABS tablet Take 1 tablet by mouth daily Suspended per patient end of august 2020         Allergies   Allergen Reactions     Penicillin [Penicillins] Anaphylaxis     Lisinopril      swelling     Metoprolol      Made BP go higher     Chlorthalidone Rash and Blisters        Social History     Tobacco Use     Smoking status: Never Smoker     Smokeless tobacco: Never Used   Substance Use Topics     Alcohol use: Yes     Alcohol/week: 0.0 - 1.0 standard drinks     Comment: ocassional     No family history on file.  History   Drug Use No         Objective     /78   Pulse 75   Temp 97.8  F (36.6  C)   Resp 16   Ht 1.791 m (5' 10.5\")   Wt 89.6 kg (197 lb 8 oz)   SpO2 97%   BMI 27.94 kg/m      Physical Exam    GENERAL APPEARANCE: healthy, alert and no distress     EYES: EOMI,  PERRL     HENT: ear canals and TM's normal and nose and mouth without ulcers or lesions     NECK: no adenopathy, no asymmetry, masses, or scars and thyroid normal to palpation     RESP: lungs clear to auscultation - no rales, rhonchi or wheezes     CV: regular rates and rhythm, normal S1 S2, no S3 or S4 and no murmur, click or rub     ABDOMEN:  soft, nontender, no HSM or masses and bowel sounds normal     MS: extremities normal- no gross deformities noted, no evidence of inflammation in joints, FROM in all extremities.     SKIN: no suspicious lesions or rashes     NEURO: Normal strength and tone, sensory exam grossly normal, mentation intact and speech normal     PSYCH: mentation appears normal. and affect normal/bright     LYMPHATICS: No cervical adenopathy    Recent Labs   Lab Test 12/07/20  1030 09/14/20  0945 07/06/20  0000 07/06/20 03/11/19  0820 03/11/19  0820   HGB  --   --   --  15.0  --   --    PLT  --   --   --  202  --   --     143   < >  --    < > 143   POTASSIUM 3.4* 3.2*   < >  --    < > 3.3*   CR 1.00 0.95   < > "  --    < > 0.75*   A1C  --   --   --   --   --  5.2    < > = values in this interval not displayed.        Diagnostics:  No labs were ordered during this visit.   No EKG required for low risk surgery (cataract, skin procedure, breast biopsy, etc).    Revised Cardiac Risk Index (RCRI):  The patient has the following serious cardiovascular risks for perioperative complications:   - No serious cardiac risks = 0 points     RCRI Interpretation: 1 point: Class II (low risk - 0.9% complication rate)           Assessment & Plan   The proposed surgical procedure is considered LOW risk.    Abdiaziz was seen today for pre-op exam.    Diagnoses and all orders for this visit:    Preop general physical exam    Essential hypertension  BP is ok for surgery but would like to get cardiology opinion long term for help in long term medication help as he does better with lower doses of Amlidopine and has multiple allergies.  -     [place] ART PRESS WAVEFORM ANALYS CENTRAL ART PRESSURE  -     CARDIOLOGY EVAL ADULT REFERRAL; Future    Pain in both feet  Surgery as scheduled.        Medication Instructions:  Patient is to take all scheduled medications on the day of surgery EXCEPT for modifications listed below:    RECOMMENDATION:  APPROVAL GIVEN to proceed with proposed procedure, without further diagnostic evaluation.    Signed Electronically by: El Wray MD    Copy of this evaluation report is provided to requesting physician.    [facility]    Revised Cardiac Risk Index

## 2021-01-14 LAB
BUN SERPL-MCNC: 26 MG/DL (ref 8–27)
BUN/CREATININE RATIO: 25 (ref 10–24)
CALCIUM SERPL-MCNC: 9.4 MG/DL (ref 8.6–10.2)
CHLORIDE SERPLBLD-SCNC: 105 MMOL/L (ref 96–106)
CREAT SERPL-MCNC: 1.05 MG/DL (ref 0.76–1.27)
EGFR IF AFRICN AM: 84 ML/MIN/1.73
EGFR IF NONAFRICN AM: 73 ML/MIN/1.73
GLUCOSE SERPL-MCNC: 95 MG/DL (ref 65–99)
POTASSIUM SERPL-SCNC: 4.2 MMOL/L (ref 3.5–5.2)
SODIUM SERPL-SCNC: 144 MMOL/L (ref 134–144)
TOTAL CO2: 19 MMOL/L (ref 20–29)

## 2021-01-29 DIAGNOSIS — I10 ESSENTIAL HYPERTENSION: Primary | ICD-10-CM

## 2021-01-29 DIAGNOSIS — I10 HTN (HYPERTENSION): ICD-10-CM

## 2021-01-29 NOTE — TELEPHONE ENCOUNTER
Triam/HCTZ. Last addressed 1/5/21.     BP Readings from Last 3 Encounters:   01/05/21 138/78   12/07/20 (!) 144/84   11/06/20 133/83       HYPERTENSION - Patient has longstanding history of HTN , currently denies any symptoms referable to elevated blood pressure. Specifically denies chest pain, palpitations, dyspnea, orthopnea, PND or peripheral edema. Systolic blood pressure readings have not been in normal range. Current medication regimen is as listed below. Patient denies any side effects of medication.

## 2021-01-31 RX ORDER — TRIAMTERENE AND HYDROCHLOROTHIAZIDE 37.5; 25 MG/1; MG/1
1 CAPSULE ORAL DAILY
Qty: 90 CAPSULE | Refills: 0 | Status: SHIPPED | OUTPATIENT
Start: 2021-01-31 | End: 2021-03-10

## 2021-02-01 ENCOUNTER — TRANSFERRED RECORDS (OUTPATIENT)
Dept: FAMILY MEDICINE | Facility: CLINIC | Age: 66
End: 2021-02-01

## 2021-02-15 ENCOUNTER — TRANSFERRED RECORDS (OUTPATIENT)
Dept: FAMILY MEDICINE | Facility: CLINIC | Age: 66
End: 2021-02-15

## 2021-02-22 ENCOUNTER — THERAPY VISIT (OUTPATIENT)
Dept: PHYSICAL THERAPY | Facility: CLINIC | Age: 66
End: 2021-02-22
Payer: COMMERCIAL

## 2021-02-22 DIAGNOSIS — M79.672 LEFT FOOT PAIN: Primary | ICD-10-CM

## 2021-02-22 DIAGNOSIS — Z47.89 AFTERCARE FOLLOWING SURGERY OF THE MUSCULOSKELETAL SYSTEM: ICD-10-CM

## 2021-02-22 PROCEDURE — 97161 PT EVAL LOW COMPLEX 20 MIN: CPT | Mod: GP | Performed by: PHYSICAL THERAPIST

## 2021-02-22 PROCEDURE — 97110 THERAPEUTIC EXERCISES: CPT | Mod: GP | Performed by: PHYSICAL THERAPIST

## 2021-02-22 NOTE — PROGRESS NOTES
[facility] Initial Evaluation  Subjective:  The history is provided by the patient. No  was used.   Therapist Generated HPI Evaluation  Problem details: Patient underwent a LEFT CHEILECTOMY WITH SOFT TISSUE ARTHROPLASTY, 2ND METATARSAL PLANTAR PLATE REPAIR, WEIL METATARSAL OSTEOTOMY, CALCANEOCUBOID LIGAMENT REPAIR, 5TH METATARSAL LIGAMENT RECONSTRUCTION (Left), RECONSTRUCTION ANKLE LIGAMENT (Left), REPAIR HAMMER TOE (Left), OSTEOTOMY FOOT (Left) on 1/9/2021 performed by Dr. Gomez. Was casted for 7 days, then placed in the CAM boot. Currently ambulating in the CAM and using a knee scooter at work. Pain is worse 1st thing in the morning and increases with weightbearing and activity. Not using anything from the pain. Elevating multiple times per day and wearing compression stocking for swelling.    Current work restrictions include 6 hrs per day at a desk, 2 hours per day on feet, no lifting beyond 15lbs, elevating every 2 hours..         Type of problem:  Left foot.    This is a new condition.  Condition occurred with:  Insidious onset.  Where condition occurred: for unknown reasons.  Patient reports pain:  Great toe and lateral.  Pain is described as other (discomfort) and is intermittent.  Pain radiates to:  No radiation. Pain is worse in the A.M..  Since onset symptoms are unchanged.  Associated symptoms:  Edema, loss of motion/stiffness, loss of strength and tingling. Symptoms are exacerbated by activity, standing, weight bearing, walking, ascending stairs, descending stairs and transfers        Restrictions due to condition include:  Working in normal job with restrictions.  Barriers include:  None as reported by patient.    Patient Health History  Abdiaziz Kimball being seen for L foot.     Problem began: 1/19/2021.   Problem occurred: post op   Pain is reported as 4/10 on pain scale.  General health as reported by patient is good.  Pertinent medical history includes:  osteoarthritis and high blood pressure.   Red flags:  None as reported by patient.  Medical allergies: none.   Surgeries include:  Orthopedic surgery. Other surgery history details: knees.    Current medications:  High blood pressure medication.    Current occupation is .   Primary job tasks include:  Computer work, operating a machine/assembly, prolonged standing and repetitive tasks.                                    Objective:    Gait:    Gait Type:  Antalgic   Assistive Devices:  CAM      Flexibility/Screens:       Lower Extremity:  Decreased left lower extremity flexibility:Gastroc and Soleus            Ankle/Foot Evaluation  ROM:    AROM:    Dorsiflexion:  Left:   9  Right:   20  Plantarflexion:  Left:  33    Right:  50      Great toe flexion:  Left:  20     Right:  50  Great Toe Extension:  Left:  8     Right: 0    Strength:    Dorsiflexion:  Left: 4/5     Pain:   Right: 5/5   Pain:  Plantarflexion: Left: 5/5   Pain:   Right: 5/5  Pain:  Inversion:Left: 4+/5  Pain:     Right: 5/5  Pain:  Eversion:Left: 4+/5  Pain:  Right: 5/5  Pain:  Flexion Great Toe:Left: 3+/5  Pain:  Right: 5/5   Pain:  Extension Great Toe:Left: 2+/5  Pain:  Right: 4-/5  Pain:                    EDEMA: Edema ankle: Metatarsal Head Circumference L 28cm, R: 26.5.  Left ankle edema present at: 70cm  Right ankle edema present at:  65cm      Figure 8 left: 70cmFigure 8 right: 65cm  MOBILITY TESTING: Mobility testing ankle: 1st MTP: L: hypomobile.          Midtarsal Left: hypomobile      First Ray Left: hypomobile      FUNCTIONAL TESTS:           Proprioception:  Stork Balance Test: Left: not able                                                      General     ROS    Assessment/Plan:    Patient is a 65 year old male with L foot complaints.    Patient has the following significant findings with corresponding treatment plan.                Diagnosis 1:  S/p L 1st MTP arthroplasty  Pain -  hot/cold therapy, manual therapy,  education, directional preference exercise and home program  Decreased ROM/flexibility - manual therapy and therapeutic exercise  Decreased joint mobility - manual therapy and therapeutic exercise  Decreased strength - therapeutic exercise and therapeutic activities  Impaired balance - neuro re-education and therapeutic activities  Decreased proprioception - neuro re-education and therapeutic activities  Edema - cold therapy  Impaired gait - gait training  Impaired muscle performance - neuro re-education  Decreased function - therapeutic activities    Therapy Evaluation Codes:   1) History comprised of:   Personal factors that impact the plan of care:      Age and Profession.    Comorbidity factors that impact the plan of care are:      High blood pressure and Osteoarthritis.     Medications impacting care: High blood pressure.  2) Examination of Body Systems comprised of:   Body structures and functions that impact the plan of care:      Toes and foot.   Activity limitations that impact the plan of care are:      Lifting, Squatting/kneeling, Stairs, Standing, Walking and Working.  3) Clinical presentation characteristics are:   Stable/Uncomplicated.  4) Decision-Making    Low complexity using standardized patient assessment instrument and/or measureable assessment of functional outcome.  Cumulative Therapy Evaluation is: Low complexity.    Previous and current functional limitations:  (See Goal Flow Sheet for this information)    Short term and Long term goals: (See Goal Flow Sheet for this information)     Communication ability:  Patient appears to be able to clearly communicate and understand verbal and written communication and follow directions correctly.  Treatment Explanation - The following has been discussed with the patient:   RX ordered/plan of care  Anticipated outcomes  Possible risks and side effects  This patient would benefit from PT intervention to resume normal activities.   Rehab potential is  good.    Frequency:  1 X week, once daily  Duration:  for 10 weeks  Discharge Plan:  Achieve all LTG.  Independent in home treatment program.  Reach maximal therapeutic benefit.    Please refer to the daily flowsheet for treatment today, total treatment time and time spent performing 1:1 timed codes.

## 2021-02-22 NOTE — LETTER
[facility] ATHLETIC [facility] ELLIS  [address]  [address]  [place] [facility] [address]  [phone]    2021    Re: Abdiaziz Kimball   :   1955  MRN:  [id number]   REFERRING PHYSICIAN:   Neil Gomez  [facility]  Date of Initial Evaluation:  21  Visits: 1 Rxs Used: 1  Reason for Referral:     Left foot pain  Aftercare following surgery of the musculoskeletal system    EVALUATION SUMMARY    [facility] Athletic [facility] Initial Evaluation    Subjective:  The history is provided by the patient. No  was used.   Therapist Generated HPI Evaluation  Problem details: Patient underwent a LEFT CHEILECTOMY WITH SOFT TISSUE ARTHROPLASTY, 2ND METATARSAL PLANTAR PLATE REPAIR, WEIL METATARSAL OSTEOTOMY, CALCANEOCUBOID LIGAMENT REPAIR, 5TH METATARSAL LIGAMENT RECONSTRUCTION (Left), RECONSTRUCTION ANKLE LIGAMENT (Left), REPAIR HAMMER TOE (Left), OSTEOTOMY FOOT (Left) on 2021 performed by Dr. Gomez. Was casted for 7 days, then placed in the CAM boot. Currently ambulating in the CAM and using a knee scooter at work. Pain is worse 1st thing in the morning and increases with weightbearing and activity. Not using anything from the pain. Elevating multiple times per day and wearing compression stocking for swelling.  Current work restrictions include 6 hrs per day at a desk, 2 hours per day on feet, no lifting beyond 15lbs, elevating every 2 hours..         Type of problem:  Left foot.  This is a new condition.  Condition occurred with:  Insidious onset.  Where condition occurred: for unknown reasons.  Patient reports pain:  Great toe and lateral.  Pain is described as other (discomfort) and is intermittent.  Pain radiates to:  No radiation. Pain is worse in the A.M..  Since onset symptoms are unchanged.  Associated symptoms:  Edema, loss of motion/stiffness, loss of strength and tingling. Symptoms are exacerbated by activity,  standing, weight bearing, walking, ascending stairs, descending stairs and transfers    Restrictions due to condition include:  Working in normal job with restrictions.  Barriers include:  None as reported by patient.  Patient Health History  Abdiaziz Kimball being seen for L foot.   Re: Abdiaziz Kimball   :   1955  MRN:  [id number]    Problem began: 2021.   Problem occurred: post op   Pain is reported as 4/10 on pain scale.  General health as reported by patient is good.  Pertinent medical history includes: osteoarthritis and high blood pressure.   Red flags:  None as reported by patient.  Medical allergies: none.   Surgeries include:  Orthopedic surgery. Other surgery history details: knees.    Current medications:  High blood pressure medication.    Current occupation is .   Primary job tasks include:  Computer work, operating a machine/assembly, prolonged standing and repetitive tasks.   Objective:  Gait:    Gait Type:  Antalgic   Assistive Devices:  CAM  Flexibility/Screens:   Lower Extremity:  Decreased left lower extremity flexibility:Gastroc and Soleus  Ankle/Foot Evaluation  ROM:    AROM:    Dorsiflexion:  Left:   9  Right:   20  Plantarflexion:  Left:  33    Right:  50  Great toe flexion:  Left:  20     Right:  50  Great Toe Extension:  Left:  8     Right: 0  Strength:    Dorsiflexion:  Left: 4/5     Pain:   Right: 5/5   Pain:  Plantarflexion: Left: 5/5   Pain:   Right: 5/5  Pain:  Inversion:Left: 4+/5  Pain:     Right: 5/5  Pain:  Eversion:Left: 4+/5  Pain:  Right: 5/5  Pain:  Flexion Great Toe:Left: 3+/5  Pain:  Right: 5/5   Pain:  Extension Great Toe:Left: 2+/5  Pain:  Right: 4-/5  Pain:  EDEMA: Edema ankle: Metatarsal Head Circumference L 28cm, R: 26.5.  Left ankle edema present at: 70cm  Right ankle edema present at:  65cm  Figure 8 left: 70cmFigure 8 right: 65cm  MOBILITY TESTING: Mobility testing ankle: 1st MTP: L: hypomobile.  Midtarsal Left: hypomobile      First Ray  Left: hypomobile      FUNCTIONAL TESTS:   Proprioception:  Stork Balance Test: Left: not able    Assessment/Plan:    Patient is a 65 year old male with L foot complaints.    Patient has the following significant findings with corresponding treatment plan.                Diagnosis 1:  S/p L 1st MTP arthroplasty  Pain -  hot/cold therapy, manual therapy, education, directional preference exercise and home program  Decreased ROM/flexibility - manual therapy and therapeutic exercise  Decreased joint mobility - manual therapy and therapeutic exercise  Decreased strength - therapeutic exercise and therapeutic activities  Re: Abdiaziz Kimball   :   1955  MRN:  [id number]    Impaired balance - neuro re-education and therapeutic activities  Decreased proprioception - neuro re-education and therapeutic activities  Edema - cold therapy  Impaired gait - gait training  Impaired muscle performance - neuro re-education  Decreased function - therapeutic activities  Therapy Evaluation Codes:   1) History comprised of:   Personal factors that impact the plan of care:      Age and Profession.    Comorbidity factors that impact the plan of care are:      High blood pressure and Osteoarthritis.     Medications impacting care: High blood pressure.  2) Examination of Body Systems comprised of:   Body structures and functions that impact the plan of care:      Toes and foot.   Activity limitations that impact the plan of care are:      Lifting, Squatting/kneeling, Stairs, Standing, Walking and Working.  3) Clinical presentation characteristics are:   Stable/Uncomplicated.  4) Decision-Making    Low complexity using standardized patient assessment instrument and/or measureable assessment of functional outcome.  Cumulative Therapy Evaluation is: Low complexity.  Previous and current functional limitations:  (See Goal Flow Sheet for this information)    Short term and Long term goals: (See Goal Flow Sheet for this information)    Communication ability:  Patient appears to be able to clearly communicate and understand verbal and written communication and follow directions correctly.  Treatment Explanation - The following has been discussed with the patient:   RX ordered/plan of care  Anticipated outcomes  Possible risks and side effects  This patient would benefit from PT intervention to resume normal activities.   Rehab potential is good.  Frequency:  1 X week, once daily  Duration:  for 10 weeks  Discharge Plan:  Achieve all LTG.  Independent in home treatment program.  Reach maximal therapeutic benefit.    Thank you for your referral.    INQUIRIES  Therapist: HARRIS MORTENSEN DPT   [facility] [address] [address]  Phone: [phone]  Fax: [phone]

## 2021-03-01 ENCOUNTER — THERAPY VISIT (OUTPATIENT)
Dept: PHYSICAL THERAPY | Facility: CLINIC | Age: 66
End: 2021-03-01
Payer: COMMERCIAL

## 2021-03-01 DIAGNOSIS — M79.672 LEFT FOOT PAIN: ICD-10-CM

## 2021-03-01 DIAGNOSIS — Z47.89 AFTERCARE FOLLOWING SURGERY OF THE MUSCULOSKELETAL SYSTEM: ICD-10-CM

## 2021-03-01 PROCEDURE — 97110 THERAPEUTIC EXERCISES: CPT | Mod: GP | Performed by: PHYSICAL THERAPIST

## 2021-03-01 PROCEDURE — 97140 MANUAL THERAPY 1/> REGIONS: CPT | Mod: GP | Performed by: PHYSICAL THERAPIST

## 2021-03-16 ENCOUNTER — TRANSFERRED RECORDS (OUTPATIENT)
Dept: FAMILY MEDICINE | Facility: CLINIC | Age: 66
End: 2021-03-16

## 2021-03-16 ENCOUNTER — THERAPY VISIT (OUTPATIENT)
Dept: PHYSICAL THERAPY | Facility: CLINIC | Age: 66
End: 2021-03-16
Payer: COMMERCIAL

## 2021-03-16 DIAGNOSIS — Z47.89 AFTERCARE FOLLOWING SURGERY OF THE MUSCULOSKELETAL SYSTEM: ICD-10-CM

## 2021-03-16 DIAGNOSIS — M79.672 LEFT FOOT PAIN: ICD-10-CM

## 2021-03-16 PROCEDURE — 97110 THERAPEUTIC EXERCISES: CPT | Mod: GP | Performed by: PHYSICAL THERAPIST

## 2021-03-16 PROCEDURE — 97140 MANUAL THERAPY 1/> REGIONS: CPT | Mod: GP | Performed by: PHYSICAL THERAPIST

## 2021-03-22 ENCOUNTER — THERAPY VISIT (OUTPATIENT)
Dept: PHYSICAL THERAPY | Facility: CLINIC | Age: 66
End: 2021-03-22
Payer: COMMERCIAL

## 2021-03-22 DIAGNOSIS — Z47.89 AFTERCARE FOLLOWING SURGERY OF THE MUSCULOSKELETAL SYSTEM: ICD-10-CM

## 2021-03-22 DIAGNOSIS — M79.672 LEFT FOOT PAIN: ICD-10-CM

## 2021-03-22 PROCEDURE — 97110 THERAPEUTIC EXERCISES: CPT | Mod: GP | Performed by: PHYSICAL THERAPIST

## 2021-03-22 PROCEDURE — 97140 MANUAL THERAPY 1/> REGIONS: CPT | Mod: GP | Performed by: PHYSICAL THERAPIST

## 2021-03-23 DIAGNOSIS — I10 HYPERTENSION, UNSPECIFIED TYPE: Primary | ICD-10-CM

## 2021-03-23 NOTE — NURSING NOTE
Future order placed for BMP needed per Dr. Ochoa @ [facility] and will be faxed upon completion @ [phone]

## 2021-03-29 ENCOUNTER — THERAPY VISIT (OUTPATIENT)
Dept: PHYSICAL THERAPY | Facility: CLINIC | Age: 66
End: 2021-03-29
Payer: COMMERCIAL

## 2021-03-29 DIAGNOSIS — Z47.89 AFTERCARE FOLLOWING SURGERY OF THE MUSCULOSKELETAL SYSTEM: ICD-10-CM

## 2021-03-29 DIAGNOSIS — M79.672 LEFT FOOT PAIN: ICD-10-CM

## 2021-03-29 PROCEDURE — 97110 THERAPEUTIC EXERCISES: CPT | Mod: GP | Performed by: PHYSICAL THERAPIST

## 2021-03-29 PROCEDURE — 97140 MANUAL THERAPY 1/> REGIONS: CPT | Mod: GP | Performed by: PHYSICAL THERAPIST

## 2021-04-01 VITALS — DIASTOLIC BLOOD PRESSURE: 74 MMHG | SYSTOLIC BLOOD PRESSURE: 138 MMHG | TEMPERATURE: 97.9 F

## 2021-04-01 DIAGNOSIS — I10 HYPERTENSION, UNSPECIFIED TYPE: ICD-10-CM

## 2021-04-02 LAB
BUN SERPL-MCNC: 28 MG/DL (ref 8–27)
BUN/CREATININE RATIO: 21 (ref 10–24)
CALCIUM SERPL-MCNC: 9.3 MG/DL (ref 8.6–10.2)
CHLORIDE SERPLBLD-SCNC: 103 MMOL/L (ref 96–106)
CREAT SERPL-MCNC: 1.35 MG/DL (ref 0.76–1.27)
EGFR IF AFRICN AM: 62 ML/MIN/1.73
EGFR IF NONAFRICN AM: 54 ML/MIN/1.73
GLUCOSE SERPL-MCNC: 91 MG/DL (ref 65–99)
POTASSIUM SERPL-SCNC: 3.9 MMOL/L (ref 3.5–5.2)
SODIUM SERPL-SCNC: 142 MMOL/L (ref 134–144)
TOTAL CO2: 26 MMOL/L (ref 20–29)

## 2021-04-05 ENCOUNTER — THERAPY VISIT (OUTPATIENT)
Dept: PHYSICAL THERAPY | Facility: CLINIC | Age: 66
End: 2021-04-05
Payer: COMMERCIAL

## 2021-04-05 DIAGNOSIS — Z47.89 AFTERCARE FOLLOWING SURGERY OF THE MUSCULOSKELETAL SYSTEM: ICD-10-CM

## 2021-04-05 DIAGNOSIS — M79.672 LEFT FOOT PAIN: ICD-10-CM

## 2021-04-05 PROCEDURE — 97110 THERAPEUTIC EXERCISES: CPT | Mod: GP | Performed by: PHYSICAL THERAPIST

## 2021-04-05 PROCEDURE — 97140 MANUAL THERAPY 1/> REGIONS: CPT | Mod: GP | Performed by: PHYSICAL THERAPIST

## 2021-04-07 ENCOUNTER — THERAPY VISIT (OUTPATIENT)
Dept: PHYSICAL THERAPY | Facility: CLINIC | Age: 66
End: 2021-04-07
Payer: COMMERCIAL

## 2021-04-07 DIAGNOSIS — M79.672 LEFT FOOT PAIN: ICD-10-CM

## 2021-04-07 DIAGNOSIS — Z47.89 AFTERCARE FOLLOWING SURGERY OF THE MUSCULOSKELETAL SYSTEM: ICD-10-CM

## 2021-04-07 PROCEDURE — 97110 THERAPEUTIC EXERCISES: CPT | Mod: GP | Performed by: PHYSICAL THERAPIST

## 2021-04-07 PROCEDURE — 97140 MANUAL THERAPY 1/> REGIONS: CPT | Mod: GP | Performed by: PHYSICAL THERAPIST

## 2021-04-12 ENCOUNTER — THERAPY VISIT (OUTPATIENT)
Dept: PHYSICAL THERAPY | Facility: CLINIC | Age: 66
End: 2021-04-12
Payer: COMMERCIAL

## 2021-04-12 DIAGNOSIS — M79.672 LEFT FOOT PAIN: ICD-10-CM

## 2021-04-12 DIAGNOSIS — Z47.89 AFTERCARE FOLLOWING SURGERY OF THE MUSCULOSKELETAL SYSTEM: ICD-10-CM

## 2021-04-12 PROCEDURE — 97140 MANUAL THERAPY 1/> REGIONS: CPT | Mod: GP | Performed by: PHYSICAL THERAPIST

## 2021-04-12 PROCEDURE — 97110 THERAPEUTIC EXERCISES: CPT | Mod: GP | Performed by: PHYSICAL THERAPIST

## 2021-04-15 ENCOUNTER — THERAPY VISIT (OUTPATIENT)
Dept: PHYSICAL THERAPY | Facility: CLINIC | Age: 66
End: 2021-04-15
Payer: COMMERCIAL

## 2021-04-15 DIAGNOSIS — Z47.89 AFTERCARE FOLLOWING SURGERY OF THE MUSCULOSKELETAL SYSTEM: ICD-10-CM

## 2021-04-15 DIAGNOSIS — M79.672 LEFT FOOT PAIN: ICD-10-CM

## 2021-04-15 PROCEDURE — 97110 THERAPEUTIC EXERCISES: CPT | Mod: GP | Performed by: PHYSICAL THERAPIST

## 2021-04-15 PROCEDURE — 97140 MANUAL THERAPY 1/> REGIONS: CPT | Mod: GP | Performed by: PHYSICAL THERAPIST

## 2021-04-20 ENCOUNTER — TRANSFERRED RECORDS (OUTPATIENT)
Dept: FAMILY MEDICINE | Facility: CLINIC | Age: 66
End: 2021-04-20

## 2021-04-26 ENCOUNTER — THERAPY VISIT (OUTPATIENT)
Dept: PHYSICAL THERAPY | Facility: CLINIC | Age: 66
End: 2021-04-26
Payer: COMMERCIAL

## 2021-04-26 DIAGNOSIS — M79.672 LEFT FOOT PAIN: ICD-10-CM

## 2021-04-26 DIAGNOSIS — Z47.89 AFTERCARE FOLLOWING SURGERY OF THE MUSCULOSKELETAL SYSTEM: ICD-10-CM

## 2021-04-26 PROCEDURE — 97140 MANUAL THERAPY 1/> REGIONS: CPT | Mod: GP | Performed by: PHYSICAL THERAPIST

## 2021-04-26 PROCEDURE — 97110 THERAPEUTIC EXERCISES: CPT | Mod: GP | Performed by: PHYSICAL THERAPIST

## 2021-04-26 NOTE — PROGRESS NOTES
Subjective:  HPI  Physical Exam                    Objective:  System    Physical Exam    General     ROS    Assessment/Plan:    PROGRESS  REPORT    Progress reporting period is from 2/22/2021 to 426/2021.       SUBJECTIVE  Subjective changes noted by patient:   Follow up surgeon last week, per patient, more great toe ROM is expected. CAM boot discharged. Swelling continues. No longer on restrictions at work.      Current pain level is 0/10.     Previous pain level was 4/10.     Changes in function:  Yes (See Goal flowsheet attached for changes in current functional level)  Adverse reaction to treatment or activity: activity - working - prolonged walking/standing    OBJECTIVE  Changes noted in objective findings:  Yes,   Objective:     Gait: normal, no CAM, no AD    Swelling Figure 8 L: 66.5 cm, Metatarsal head: 26.5cm,     AROM L Great Toe Ext: 10 (16 after), Flx: 54,     PROM L Great Toe Ext: 22, Ankle DF: 10, PF: 50;     Strength L Ankle DF: 5/5, PF: 5/5, Inv: 5/5, Ever: 5/5, Great Toe Flx: 5/5, Great Toe Ext: 5/5; Hip     ASSESSMENT/PLAN  Updated problem list and treatment plan: Diagnosis 1:   S/p L 1st MTP arthroplasty    Decreased ROM/flexibility - manual therapy and therapeutic exercise  Decreased joint mobility - manual therapy and therapeutic exercise  Decreased strength - therapeutic exercise and therapeutic activities  Edema - cold therapy  Impaired muscle performance - neuro re-education  Decreased function - therapeutic activities  STG/LTGs have been met or progress has been made towards goals:  Yes (See Goal flow sheet completed today.)  Assessment of Progress: The patient's condition is improving.  The patient's condition has potential to improve.  Patient is meeting short term goals and is progressing towards long term goals.  Self Management Plans:  Patient has been instructed in a home treatment program.  I have re-evaluated this patient and find that the nature, scope, duration and intensity of  the therapy is appropriate for the medical condition of the patient.  Abdiaziz continues to require the following intervention to meet STG and LTG's:  PT    Recommendations:  This patient would benefit from continued therapy.     Frequency:  2 X a month, once daily  Duration:  for 8 weeks        Please refer to the daily flowsheet for treatment today, total treatment time and time spent performing 1:1 timed codes.

## 2021-04-26 NOTE — LETTER
[facility] SERVICES ELLIS  [address]  [address]  [place] [address]  [phone]    2021    Re: Abdiaziz Kimball   :   1955  MRN:  [id number]   REFERRING PHYSICIAN:   Neil COREAS [facility]  Date of Initial Evaluation:  21  Visits: 10  Rxs Used: 10  Reason for Referral:     Aftercare following surgery of the musculoskeletal system  Left foot pain      PROGRESS  REPORT    Progress reporting period is from 2021 to .     SUBJECTIVE  Subjective changes noted by patient:   Follow up surgeon last week, per patient, more great toe ROM is expected. CAM boot discharged. Swelling continues. No longer on restrictions at work.    Current pain level is 0/10.     Previous pain level was 4/10.   Changes in function:  Yes (See Goal flowsheet attached for changes in current functional level)  Adverse reaction to treatment or activity: activity - working - prolonged walking/standing  OBJECTIVE  Changes noted in objective findings:  Yes,   Objective:   Gait: normal, no CAM, no AD  Swelling Figure 8 L: 66.5 cm, Metatarsal head: 26.5cm,   AROM L Great Toe Ext: 10 (16 after), Flx: 54,   PROM L Great Toe Ext: 22, Ankle DF: 10, PF: 50;   Strength L Ankle DF: 5/5, PF: 5/5, Inv: 5/5, Ever: 5/5, Great Toe Flx: 5/5, Great Toe Ext: 5/5; Hip   ASSESSMENT/PLAN  Updated problem list and treatment plan: Diagnosis 1:   S/p L 1st MTP arthroplasty    Decreased ROM/flexibility - manual therapy and therapeutic exercise  Decreased joint mobility - manual therapy and therapeutic exercise  Decreased strength - therapeutic exercise and therapeutic activities  Edema - cold therapy  Impaired muscle performance - neuro re-education  Decreased function - therapeutic activities  STG/LTGs have been met or progress has been made towards goals:  Yes (See Goal flow sheet completed today.)  Assessment of Progress: The patient's condition is  improving.  The patient's condition has potential to improve.  Patient is meeting short term goals and is progressing towards long term goals.  Self Management Plans:  Patient has been instructed in a home treatment program.  Re: Abdiaziz Kimball   :   1955  MRN:  [id number]       I have re-evaluated this patient and find that the nature, scope, duration and intensity of the therapy is appropriate for the medical condition of the patient.  Abdiaziz continues to require the following intervention to meet STG and LTG's:  PT  Recommendations:  This patient would benefit from continued therapy.     Frequency:  2 X a month, once daily  Duration:  for 8 weeks    Thank you for your referral.    INQUIRIES  Therapist: HARRIS MORTENSEN DPT   [place] [address]  Phone: [phone]  Fax: [phone]

## 2021-05-10 ENCOUNTER — THERAPY VISIT (OUTPATIENT)
Dept: PHYSICAL THERAPY | Facility: CLINIC | Age: 66
End: 2021-05-10
Payer: COMMERCIAL

## 2021-05-10 DIAGNOSIS — M79.672 LEFT FOOT PAIN: ICD-10-CM

## 2021-05-10 DIAGNOSIS — Z47.89 AFTERCARE FOLLOWING SURGERY OF THE MUSCULOSKELETAL SYSTEM: ICD-10-CM

## 2021-05-10 PROCEDURE — 97140 MANUAL THERAPY 1/> REGIONS: CPT | Mod: GP | Performed by: PHYSICAL THERAPIST

## 2021-05-10 PROCEDURE — 97110 THERAPEUTIC EXERCISES: CPT | Mod: GP | Performed by: PHYSICAL THERAPIST

## 2021-05-10 PROCEDURE — 97112 NEUROMUSCULAR REEDUCATION: CPT | Mod: GP | Performed by: PHYSICAL THERAPIST

## 2021-05-11 DIAGNOSIS — I10 ESSENTIAL HYPERTENSION: Primary | ICD-10-CM

## 2021-05-11 NOTE — NURSING NOTE
Future lab otder placed for lab needed per [employer] and seymour be faxed to Doreen LEGER @ [facility] Fax# [phone] upon completion.

## 2021-05-12 VITALS — DIASTOLIC BLOOD PRESSURE: 84 MMHG | HEART RATE: 69 BPM | SYSTOLIC BLOOD PRESSURE: 136 MMHG | TEMPERATURE: 98.1 F

## 2021-05-12 DIAGNOSIS — I10 ESSENTIAL HYPERTENSION: ICD-10-CM

## 2021-05-12 LAB
% GRANULOCYTES: 59.6 % (ref 42.2–75.2)
HCT VFR BLD AUTO: 38.6 % (ref 39–51)
HEMOGLOBIN: 13.5 G/DL (ref 13.4–17.5)
LYMPHOCYTES NFR BLD AUTO: 31.3 % (ref 20.5–51.1)
MCH RBC QN AUTO: 30 PG (ref 27–31)
MCHC RBC AUTO-ENTMCNC: 34.9 G/DL (ref 33–37)
MCV RBC AUTO: 85.9 FL (ref 80–100)
MONOCYTES NFR BLD AUTO: 9.1 % (ref 1.7–9.3)
PLATELET # BLD AUTO: 214 K/UL (ref 140–450)
RBC # BLD AUTO: 4.49 X10/CMM (ref 4.2–5.9)
WBC # BLD AUTO: 4.7 X10/CMM (ref 3.8–11)

## 2021-05-12 PROCEDURE — 85025 COMPLETE CBC W/AUTO DIFF WBC: CPT | Performed by: FAMILY MEDICINE

## 2021-05-12 PROCEDURE — 36415 COLL VENOUS BLD VENIPUNCTURE: CPT | Performed by: FAMILY MEDICINE

## 2021-05-13 LAB
BUN SERPL-MCNC: 33 MG/DL (ref 8–27)
BUN/CREATININE RATIO: 23 (ref 10–24)
CALCIUM SERPL-MCNC: 9.1 MG/DL (ref 8.6–10.2)
CHLORIDE SERPLBLD-SCNC: 103 MMOL/L (ref 96–106)
CREAT SERPL-MCNC: 1.45 MG/DL (ref 0.76–1.27)
EGFR IF AFRICN AM: 57 ML/MIN/1.73
EGFR IF NONAFRICN AM: 49 ML/MIN/1.73
GLUCOSE SERPL-MCNC: 97 MG/DL (ref 65–99)
POTASSIUM SERPL-SCNC: 3.6 MMOL/L (ref 3.5–5.2)
SODIUM SERPL-SCNC: 142 MMOL/L (ref 134–144)
TOTAL CO2: 26 MMOL/L (ref 20–29)
TSH BLD-ACNC: 2.33 UIU/ML (ref 0.45–4.5)

## 2021-05-24 ENCOUNTER — TRANSFERRED RECORDS (OUTPATIENT)
Dept: FAMILY MEDICINE | Facility: CLINIC | Age: 66
End: 2021-05-24

## 2021-06-24 ENCOUNTER — HOSPITAL ENCOUNTER (EMERGENCY)
Facility: CLINIC | Age: 66
Discharge: HOME | End: 2021-06-24
Attending: EMERGENCY MEDICINE | Admitting: EMERGENCY MEDICINE
Payer: COMMERCIAL

## 2021-06-24 VITALS
HEART RATE: 81 BPM | RESPIRATION RATE: 12 BRPM | OXYGEN SATURATION: 96 % | TEMPERATURE: 98.1 F | SYSTOLIC BLOOD PRESSURE: 117 MMHG | DIASTOLIC BLOOD PRESSURE: 92 MMHG

## 2021-06-24 DIAGNOSIS — I48.91 ATRIAL FIBRILLATION WITH RVR (H): ICD-10-CM

## 2021-06-24 LAB
ALBUMIN SERPL-MCNC: 3.9 G/DL (ref 3.4–5)
ALP SERPL-CCNC: 84 U/L (ref 40–150)
ALT SERPL W P-5'-P-CCNC: 18 U/L (ref 0–70)
ANION GAP SERPL CALCULATED.3IONS-SCNC: 3 MMOL/L (ref 3–14)
AST SERPL W P-5'-P-CCNC: 12 U/L (ref 0–45)
BASOPHILS # BLD AUTO: 0.1 10E9/L (ref 0–0.2)
BASOPHILS NFR BLD AUTO: 0.7 %
BILIRUB SERPL-MCNC: 0.5 MG/DL (ref 0.2–1.3)
BUN SERPL-MCNC: 38 MG/DL (ref 7–30)
CALCIUM SERPL-MCNC: 8.9 MG/DL (ref 8.5–10.1)
CHLORIDE SERPL-SCNC: 109 MMOL/L (ref 94–109)
CO2 SERPL-SCNC: 28 MMOL/L (ref 20–32)
CREAT SERPL-MCNC: 1.7 MG/DL (ref 0.66–1.25)
DIFFERENTIAL METHOD BLD: NORMAL
EOSINOPHIL # BLD AUTO: 0.1 10E9/L (ref 0–0.7)
EOSINOPHIL NFR BLD AUTO: 1 %
ERYTHROCYTE [DISTWIDTH] IN BLOOD BY AUTOMATED COUNT: 12.4 % (ref 10–15)
GFR SERPL CREATININE-BSD FRML MDRD: 41 ML/MIN/{1.73_M2}
GLUCOSE SERPL-MCNC: 102 MG/DL (ref 70–99)
HCT VFR BLD AUTO: 42.1 % (ref 40–53)
HGB BLD-MCNC: 14.7 G/DL (ref 13.3–17.7)
IMM GRANULOCYTES # BLD: 0 10E9/L (ref 0–0.4)
IMM GRANULOCYTES NFR BLD: 0.4 %
LYMPHOCYTES # BLD AUTO: 1.9 10E9/L (ref 0.8–5.3)
LYMPHOCYTES NFR BLD AUTO: 26.2 %
MAGNESIUM SERPL-MCNC: 2.6 MG/DL (ref 1.6–2.3)
MCH RBC QN AUTO: 30.2 PG (ref 26.5–33)
MCHC RBC AUTO-ENTMCNC: 34.9 G/DL (ref 31.5–36.5)
MCV RBC AUTO: 87 FL (ref 78–100)
MONOCYTES # BLD AUTO: 0.8 10E9/L (ref 0–1.3)
MONOCYTES NFR BLD AUTO: 11.5 %
NEUTROPHILS # BLD AUTO: 4.3 10E9/L (ref 1.6–8.3)
NEUTROPHILS NFR BLD AUTO: 60.2 %
NRBC # BLD AUTO: 0 10*3/UL
NRBC BLD AUTO-RTO: 0 /100
PLATELET # BLD AUTO: 253 10E9/L (ref 150–450)
POTASSIUM SERPL-SCNC: 3.7 MMOL/L (ref 3.4–5.3)
PROT SERPL-MCNC: 7.7 G/DL (ref 6.8–8.8)
RBC # BLD AUTO: 4.86 10E12/L (ref 4.4–5.9)
SODIUM SERPL-SCNC: 140 MMOL/L (ref 133–144)
TROPONIN I SERPL-MCNC: <0.015 UG/L (ref 0–0.04)
WBC # BLD AUTO: 7.1 10E9/L (ref 4–11)

## 2021-06-24 PROCEDURE — 83735 ASSAY OF MAGNESIUM: CPT | Performed by: EMERGENCY MEDICINE

## 2021-06-24 PROCEDURE — 96361 HYDRATE IV INFUSION ADD-ON: CPT

## 2021-06-24 PROCEDURE — 250N000009 HC RX 250: Performed by: EMERGENCY MEDICINE

## 2021-06-24 PROCEDURE — 99284 EMERGENCY DEPT VISIT MOD MDM: CPT | Mod: 25

## 2021-06-24 PROCEDURE — 96374 THER/PROPH/DIAG INJ IV PUSH: CPT

## 2021-06-24 PROCEDURE — 85025 COMPLETE CBC W/AUTO DIFF WBC: CPT | Performed by: EMERGENCY MEDICINE

## 2021-06-24 PROCEDURE — 80053 COMPREHEN METABOLIC PANEL: CPT | Performed by: EMERGENCY MEDICINE

## 2021-06-24 PROCEDURE — 84484 ASSAY OF TROPONIN QUANT: CPT | Performed by: EMERGENCY MEDICINE

## 2021-06-24 PROCEDURE — 93005 ELECTROCARDIOGRAM TRACING: CPT

## 2021-06-24 PROCEDURE — 258N000003 HC RX IP 258 OP 636: Performed by: EMERGENCY MEDICINE

## 2021-06-24 RX ORDER — HEPARIN SODIUM 10000 [USP'U]/100ML
0-5000 INJECTION, SOLUTION INTRAVENOUS CONTINUOUS
Status: DISCONTINUED | OUTPATIENT
Start: 2021-06-24 | End: 2021-06-24

## 2021-06-24 RX ORDER — DILTIAZEM HYDROCHLORIDE 240 MG/1
240 CAPSULE, COATED, EXTENDED RELEASE ORAL DAILY
Qty: 30 CAPSULE | Refills: 0 | Status: SHIPPED | OUTPATIENT
Start: 2021-06-24 | End: 2023-08-02

## 2021-06-24 RX ORDER — SODIUM CHLORIDE 9 MG/ML
INJECTION, SOLUTION INTRAVENOUS CONTINUOUS
Status: DISCONTINUED | OUTPATIENT
Start: 2021-06-24 | End: 2021-06-24 | Stop reason: HOSPADM

## 2021-06-24 RX ORDER — DILTIAZEM HYDROCHLORIDE 5 MG/ML
10 INJECTION INTRAVENOUS ONCE
Status: COMPLETED | OUTPATIENT
Start: 2021-06-24 | End: 2021-06-24

## 2021-06-24 RX ADMIN — DILTIAZEM HYDROCHLORIDE 10 MG: 5 INJECTION, SOLUTION INTRAVENOUS at 20:00

## 2021-06-24 RX ADMIN — SODIUM CHLORIDE 1000 ML: 9 INJECTION, SOLUTION INTRAVENOUS at 19:59

## 2021-06-24 ASSESSMENT — ENCOUNTER SYMPTOMS
VOMITING: 0
SHORTNESS OF BREATH: 0
LIGHT-HEADEDNESS: 1
DIARRHEA: 0
PALPITATIONS: 1

## 2021-06-25 LAB — INTERPRETATION ECG - MUSE: NORMAL

## 2021-06-25 NOTE — DISCHARGE INSTRUCTIONS
STOP your nifedipine. Take first diltiazem tonight. Stop other blood pressure medication unless blood pressure too high with diltiazem.    Discharge Instructions  Palpitations    Palpitations are an unusual awareness of your heartbeat. People often describe this as the heart skipping, fluttering, racing, irregular, or pounding. At this time, your provider has found no signs that your palpitations are due to a serious or life-threatening condition. However, sometimes there is a serious problem that does not show up right away.    Palpitations can be caused by caffeine, cigarettes, diet pills, energy drinks or supplements, other stimulants, and medications and street drugs. They can also be caused by anxiety, hormone conditions such as high thyroid, and other medical conditions. Sometimes they are a sign of abnormal rhythm in the heart. At this time, your provider did not find any dangerous cause of your symptoms.    Generally, every Emergency Department visit should have a follow-up clinic visit with either a primary or a specialty clinic/provider. Please follow-up as instructed by your emergency provider today.    Return to the Emergency Department if:  You get chest pain or tightness.  You are short of breath.  You get very weak or tired.  You pass out or faint.  Your heart rate is over 120 beats per minute for more than 10 minutes while you are resting.   You have anything else that worries you.    What can I do to help myself?  Fill any prescriptions the provider gave you and take them right away.   Follow your provider s instructions about the prescription medicines you are on. Sometimes the provider may tell you to stop taking a medicine or change the dose.  If you smoke, this may be a good time to quit! The less you can smoke, the better.  Do not use energy drinks, diet pills, or stimulants. Limit your use of caffeine.  If you were given a prescription for medicine here today, be sure to read all of the  information (including the package insert) that comes with your prescription.  This will include important information about the medicine, its side effects, and any warnings that you need to know about.  The pharmacist who fills the prescription can provide more information and answer questions you may have about the medicine.  If you have questions or concerns that the pharmacist cannot address, please call or return to the Emergency Department.     Remember that you can always come back to the Emergency Department if you are not able to see your regular provider in the amount of time listed above, if you get any new symptoms, or if there is anything that worries you.

## 2021-06-25 NOTE — ED TRIAGE NOTES
Pt reports he has been having dizziness, Low BP, exertional SOB. Went to [facility], had EKG. Has EKG changes.

## 2021-06-25 NOTE — ED PROVIDER NOTES
"  History   Chief Complaint:  Lightheadedness     The history is provided by the patient.      Abdiaziz Kimball is a 65 year old male with history of hypertension and hyperlipidemia who presents for evaluation of lightheadedness. The patient states that 4 days ago he noticed whenever he stands up intermittently he has episodes of lightheadedness. His son is a paramedic and was concerned about this and attached him to a 4 lead EKG and saw some readings that concerned him for A-fib. The patient states that he has had some intermittent sensations where his heart rate feels \"goofy\" but nothing very severe. He states that he feels fine at rest and denies any vomiting, diarrhea, chest pain, shortness of breath, and any other known symptoms at this time.     Review of Systems   Respiratory: Negative for shortness of breath.    Cardiovascular: Positive for palpitations. Negative for chest pain.   Gastrointestinal: Negative for diarrhea and vomiting.   Neurological: Positive for light-headedness.   All other systems reviewed and are negative.    Allergies:  Penicillins  Lisinopril  Metoprolol  Chlorthalidone    Medications:  Amlodipine   Coenzyme Q10  Cozaar  Potassium Chloride ER  Dyazide  Vitamin B  Nifedipine 30 mg  Hydrochlorothiazide 12.5 mg    Past Medical History:    Hypertension  Hyperlipidemia  Hypokalemia  Renal Cyst     Past Surgical History:    Arthroscopy Knee Left x4  Arthroscopy Knee Right x2  Hernia Repair Inguinal Right  Vasectomy     Social History:  The patient presents to the ED with son.    Physical Exam     Patient Vitals for the past 24 hrs:   BP Temp Temp src Pulse Resp SpO2   06/24/21 2015 (!) 117/92 -- -- 79 20 96 %   06/24/21 2000 120/74 -- -- 96 13 96 %   06/24/21 1945 115/84 -- -- 141 19 95 %   06/24/21 1912 108/67 98.1  F (36.7  C) Temporal 67 16 98 %       Physical Exam  VS: Reviewed per above  HENT: normal speech  EYES: sclera anicteric  CV: Rate as noted, irregularly irregular rhythm.   RESP: " Effort normal. Breath sounds are normal bilaterally.  NEURO: Alert, moving all extremities  MSK: No deformity of the extremities  SKIN: Warm and dry    Emergency Department Course     ECG:  Indication: Lightheadedness  Completed at 1918.  Read at 1936.   Atrial fibrillation with rapid RVR response with premature ventricular or aberrantly conducted complexes. Left axis deviation. Septal infarct, age undetermined. Abnormal ECG.   Rate 124 bpm. TX interval *. QRS duration 84. QT/QTc 322/462. P-R-T axes * -41 27.    Laboratory:  CBC: WBC 7.1, HGB 14.7,     CMP: Glucose 102 (H), BUN 38 (H), Creat 1.70 (H), GFR 41 (L), o/w WNL     Troponin: <0.015    Magnesium: 2.6 (H)      Emergency Department Course:  Reviewed:  I reviewed nursing notes, vitals, past medical history and care everywhere    Assessments:  1936 I performed a physical exam of the patient. Findings as above.     2009 Patient rechecked and updated. Patient states he wants to go home.     2019 Patient rechecked and updated.     2052 Patient rechecked and updated.    2056 Plan of care discussed and questions answered.     Consults:   1958 I spoke with Dr. Shields of the Cardiology service regarding patient's presentation, findings, and plan of care.    2018 I spoke with Dr. Shields of the Cardiology service regarding patient's presentation, findings, and plan of care.    Interventions:  1959 NaCl 0.9% BOLUS 1000 mL IV  2000 Cardizem 10 mg IV    Disposition:  The patient was discharged to home.     Impression & Plan   Medical Decision Making:  Patient presents to the ER for evaluation of intermittent episodes of lightheadedness and palpitations.  On arrival heart rate varies from 67 to 140 bpm.  EKG confirms atrial fibrillation with rapid ventricular response.  No specific acute ischemic change.  A single troponin is negative.  I have low suspicion for occult ACS.  No concerning electrolyte derangement.  After IV fluids and Cardizem bolus, heart  rate improved to the 70 range.  Patient felt much better with standing and ambulation.  I spoke with cardiology and they recommended admission, but patient did not wish to go down this route.  Ultimately cardiology recommended stopping PTA nifedipine and starting Cardizem 240 mg extended release daily as well as Eliquis 5 mg twice daily.  Patient was agreeable to this.  I also instructed patient to measure his blood pressure with this new medication and to avoid taking his other antihypertensives unless blood pressure is persistently elevated.  We discussed increased risk of bleeding while on Eliquis and appropriate precautions to take.  I encouraged cardiology follow-up.  Return precaution discussed with patient and son prior to discharge.    Diagnosis:    ICD-10-CM    1. Atrial fibrillation with RVR (H)  I48.91        Discharge Medications:  New Prescriptions    APIXABAN ANTICOAGULANT (ELIQUIS) 2.5 MG TABLET    Take 2 tablets (5 mg) by mouth 2 times daily    DILTIAZEM ER COATED BEADS (CARDIZEM CD/CARTIA XT) 240 MG 24 HR CAPSULE    Take 1 capsule (240 mg) by mouth daily     Scribe Disclosure:  I, Brady Mason, am serving as a scribe at 7:33 PM on 6/24/2021 to document services personally performed by Michael Hernandez MD based on my observations and the provider's statements to me.     [facility]         Michael Hernandez MD  06/24/21 5203

## 2021-06-28 ENCOUNTER — TRANSFERRED RECORDS (OUTPATIENT)
Dept: FAMILY MEDICINE | Facility: CLINIC | Age: 66
End: 2021-06-28

## 2021-06-28 ENCOUNTER — TRANSFERRED RECORDS (OUTPATIENT)
Dept: HEALTH INFORMATION MANAGEMENT | Facility: CLINIC | Age: 66
End: 2021-06-28

## 2021-07-21 DIAGNOSIS — R79.89 ELEVATED SERUM CREATININE: Primary | ICD-10-CM

## 2021-07-21 PROCEDURE — 36415 COLL VENOUS BLD VENIPUNCTURE: CPT | Performed by: FAMILY MEDICINE

## 2021-07-21 NOTE — PROGRESS NOTES
orders from Dr Gamboa, Doreen MENDOZA-LYDIA, [facility] fax [phone]  Drawn today  Angelique Camilo MA July 21, 2021 8:16 AM

## 2021-07-22 LAB
BUN SERPL-MCNC: 26 MG/DL (ref 8–27)
BUN/CREATININE RATIO: 18 (ref 10–24)
CALCIUM SERPL-MCNC: 8.9 MG/DL (ref 8.6–10.2)
CHLORIDE SERPLBLD-SCNC: 105 MMOL/L (ref 96–106)
CREAT SERPL-MCNC: 1.46 MG/DL (ref 0.76–1.27)
EGFR IF AFRICN AM: 57 ML/MIN/1.73
EGFR IF NONAFRICN AM: 49 ML/MIN/1.73
GLUCOSE SERPL-MCNC: 97 MG/DL (ref 65–99)
POTASSIUM SERPL-SCNC: 3.3 MMOL/L (ref 3.5–5.2)
SODIUM SERPL-SCNC: 143 MMOL/L (ref 134–144)
TOTAL CO2: 23 MMOL/L (ref 20–29)

## 2021-07-22 NOTE — NURSING NOTE
Faxed lab results today  To [facility] [phone]    Angelique Camilo MA July 22, 2021 8:03 AM

## 2021-07-28 ENCOUNTER — MEDICAL CORRESPONDENCE (OUTPATIENT)
Dept: HEALTH INFORMATION MANAGEMENT | Facility: CLINIC | Age: 66
End: 2021-07-28

## 2021-08-02 ENCOUNTER — TRANSFERRED RECORDS (OUTPATIENT)
Dept: FAMILY MEDICINE | Facility: CLINIC | Age: 66
End: 2021-08-02

## 2021-08-16 ENCOUNTER — TELEPHONE (OUTPATIENT)
Dept: CARDIOLOGY | Facility: CLINIC | Age: 66
End: 2021-08-16

## 2021-08-16 NOTE — TELEPHONE ENCOUNTER
8/16/21 Copy of leadless monitor recd from [facility] . Copy w chart prep and original sent to HIM for scanning  Hugo 1110 am

## 2021-08-16 NOTE — TELEPHONE ENCOUNTER
8/16/21 Spoke w pt who stated he had a 7 day leadless monitor recently and results were recd at [facility]. LM on medical records VM there and also called and LM on RN requesting tracings to faxed to [phone].  Hugo 10 am

## 2021-08-17 ENCOUNTER — OFFICE VISIT (OUTPATIENT)
Dept: CARDIOLOGY | Facility: CLINIC | Age: 66
End: 2021-08-17
Payer: COMMERCIAL

## 2021-08-17 VITALS
BODY MASS INDEX: 28.49 KG/M2 | HEIGHT: 70 IN | DIASTOLIC BLOOD PRESSURE: 85 MMHG | HEART RATE: 52 BPM | SYSTOLIC BLOOD PRESSURE: 130 MMHG | WEIGHT: 199 LBS

## 2021-08-17 DIAGNOSIS — I48.0 PAROXYSMAL ATRIAL FIBRILLATION (H): Primary | ICD-10-CM

## 2021-08-17 DIAGNOSIS — I10 BENIGN ESSENTIAL HYPERTENSION: ICD-10-CM

## 2021-08-17 PROCEDURE — 93000 ELECTROCARDIOGRAM COMPLETE: CPT | Performed by: INTERNAL MEDICINE

## 2021-08-17 PROCEDURE — 99204 OFFICE O/P NEW MOD 45 MIN: CPT | Performed by: INTERNAL MEDICINE

## 2021-08-17 ASSESSMENT — MIFFLIN-ST. JEOR: SCORE: 1683.91

## 2021-08-17 NOTE — PROGRESS NOTES
HPI and Plan:   See dictation [id number]    Orders Placed This Encounter   Procedures     EKG 12-lead complete w/read - Clinics (future- to be scheduled)     EKG 12-lead complete w/read - Clinics (future- to be scheduled)     EKG 12-lead complete w/read - Clinics (future- to be scheduled)     EKG 12-lead complete w/read - Clinics (future- to be scheduled)     Exercise Stress Test - Adult       Orders Placed This Encounter   Medications     apixaban ANTICOAGULANT (ELIQUIS) 5 MG tablet     Sig: Take 5 mg by mouth 2 times daily       Medications Discontinued During This Encounter   Medication Reason     losartan (COZAAR) 25 MG tablet      amLODIPine (NORVASC) 2.5 MG tablet      Glucosamine-Chondroit-Vit C-Mn (GLUCOSAMINE CHONDROITINOITIN COMPLEX) CAPS      vitamin B complex with vitamin C (VITAMIN  B COMPLEX) TABS tablet      NUTRITIONAL SUPPLEMENTS PO          Encounter Diagnoses   Name Primary?     Atrial fibrillation (H)      Paroxysmal atrial fibrillation (H) Yes       CURRENT MEDICATIONS:  Current Outpatient Medications   Medication Sig Dispense Refill     apixaban ANTICOAGULANT (ELIQUIS) 5 MG tablet Take 5 mg by mouth 2 times daily       Coenzyme Q10 (CO Q-10) 200 MG CAPS Take  by mouth daily.       diltiazem ER COATED BEADS (CARDIZEM CD/CARTIA XT) 240 MG 24 hr capsule Take 1 capsule (240 mg) by mouth daily 30 capsule 0     potassium chloride ER (K-TAB) 20 MEQ CR tablet Take 0.5 tablets (10 mEq) by mouth daily 90 tablet 1     triamterene-HCTZ (DYAZIDE) 37.5-25 MG capsule Take 1 capsule by mouth daily 90 capsule 0       ALLERGIES     Allergies   Allergen Reactions     Penicillins Anaphylaxis and Unknown     Rash, shortness of breath, swelling     Lisinopril Cough     02/19/21 -- reviewed paper chart seeking info/reason lisinopril was stopped. Found note from to Dr. Wray from 9/2009 reporting stopped lisinopril due to cough. Had experimented going off a few times over some time and reported in note that every time  he tries the lisinopril it causes a cough.      Metoprolol      Made BP go higher     Chlorthalidone Rash, Blisters and Dermatitis       PAST MEDICAL HISTORY:  Past Medical History:   Diagnosis Date     Dry skin 12/4/2014     Foot pain 11/6/2012    OA of both feet      HTN (hypertension)     Renal artery US neg for stenosis 2008.     HTN, goal below 140/90      Hyperlipidaemia LDL goal < 100      Hypokalemia 11/17/2011     Renal cyst 01/25/2008    Small. Seen on renal US.       PAST SURGICAL HISTORY:  Past Surgical History:   Procedure Laterality Date     ARTHROSCOPY KNEE RT/LT Left 1990's     ARTHROSCOPY KNEE RT/LT Left      ARTHROSCOPY KNEE RT/LT Left     Mark Quintero     ARTHROSCOPY KNEE RT/LT Left 2003ish    Mark Quintero     ARTHROSCOPY KNEE RT/LT Right 2005    Mark Quintero     HERNIA REPAIR, INGUINAL RT/LT Right 2004    Incarerated hernia with resection, Dr. Moraes     VASECTOMY  1988       FAMILY HISTORY:  History reviewed. No pertinent family history.    SOCIAL HISTORY:  Social History     Socioeconomic History     Marital status:      Spouse name: None     Number of children: None     Years of education: None     Highest education level: None   Occupational History     None   Tobacco Use     Smoking status: Never Smoker     Smokeless tobacco: Never Used   Substance and Sexual Activity     Alcohol use: Yes     Alcohol/week: 0.0 - 1.0 standard drinks     Comment: ocassional     Drug use: No     Sexual activity: Not Currently     Partners: Female   Other Topics Concern     None   Social History Narrative     None     Social Determinants of Health     Financial Resource Strain:      Difficulty of Paying Living Expenses:    Food Insecurity:      Worried About Running Out of Food in the Last Year:      Ran Out of Food in the Last Year:    Transportation Needs:      Lack of Transportation (Medical):      Lack of Transportation (Non-Medical):    Physical Activity:      Days of Exercise per Week:       Minutes of Exercise per Session:    Stress:      Feeling of Stress :    Social Connections:      Frequency of Communication with Friends and Family:      Frequency of Social Gatherings with Friends and Family:      Attends Yarsanism Services:      Active Member of Clubs or Organizations:      Attends Club or Organization Meetings:      Marital Status:    Intimate Partner Violence:      Fear of Current or Ex-Partner:      Emotionally Abused:      Physically Abused:      Sexually Abused:        Review of Systems:  Skin:  Negative       Eyes:  Positive for glasses    ENT:  Negative      Respiratory:  Positive for shortness of breath with a fib   Cardiovascular:    Positive for;lightheadedness afib  Gastroenterology: Negative      Genitourinary:  Negative      Musculoskeletal:  Negative      Neurologic:  Negative      Psychiatric:  Negative      Heme/Lymph/Imm:  Negative      Endocrine:  Negative

## 2021-08-17 NOTE — PROGRESS NOTES
"Service Date: 08/17/2021    CONSULTATION     HISTORY OF PRESENT ILLNESS:    I had the pleasure seeing Mr. Abdiaziz Kimball, a delightful 65-year-old  who has been referred by Dr. Wray for discussion of treatment options for paroxysmal atrial fibrillation.    The patient has hypertension and has been treated with various agents over the years.  He says he is quite sensitive to medications and did not tolerate several antihypertensives in the past including lisinopril, metoprolol and losartan.  He also tells me that low-dose amlodipine was not effective in lowering his BP.  He has been followed by Dr. Ochoa and Doreen Carias PA-C, in [facility] in [place].    Mr. Kimball recalls of having episodes of \"heart fluttering\" in the past, though nothing that really got his attention.  On 06/24/2021, he felt his heart really racing.  He became lightheaded.  His son, an EMT, and his daughter, a nurse, encouraged him to go to the emergency room.  He was found to be in atrial fibrillation with RVR.  Troponin was negative.  He was given diltiazem bolus with improvement in heart rate into the 70s.  After discussion with the on-call cardiologist, he was started on Eliquis and diltiazem  mg daily.  His creatinine was 1.7 in the ER, though by 07/21/2021 it had decreased to 1.46 mg/dl which is near baseline.    The patient had a 7-day leadless monitor hooked up in early July.  It showed 6 episodes of atrial tachycardia, longest 23 beats.  Also, one 5-beat run of wide QRS tachycardia.  He had occasional sinus bradycardia as well as junctional bradycardia, mostly during sleep.  He had a 2.7-second pause during sleep.      The patient has felt better on diltiazem.  He says this medication has been effective for his hypertension.  So far, he has tolerated well.  He has not had any bleeding issues on Eliquis.    He is moderately physically active without chest pain to suggest angina.  He has no " orthopnea, PND or lower extremity edema.  He has never had syncope.    SOCIAL HISTORY:  He is an .  He lives alone.  Non-smoker and drinks alcohol socially.  FAMILY HISTORY:  Significant for his mother having CABG in her 70s, although there is no history of early-onset heart disease.  REVIEW OF SYSTEMS:  Significant for disrupted sleep but no history of snoring.  He is scheduled to undergo formal polysomnography in the near future.      DIAGNOSTIC STUDIES:    - Recent laboratory tests:  Sodium 143, potassium 3.3, creatinine 1.46, calcium 8.9.  TSH 2.33.  Hematocrit 42%, white count 7100.  - For results of his recent ECGs, see HPI.  His ECG today showed sinus bradycardia at 48 beats per minute, low voltage in the limb leads and nonspecific ST-T abnormality.  - He reports having an echocardiogram in Dr. Ochoa' office within the past year.  I do not have a report.      IMPRESSION:    1.  Paroxysmal atrial fibrillation.  Mr. Kimball experienced an episode of symptomatic atrial fibrillation with RVR in June.  First documented event.  He has been treated with rate control (diltiazem XT) and Eliquis ([id number] score is 2 for age and history of hypertension).  The episode of arrhythmia spontaneously resolved days after his ER visit.  He has had no symptomatic recurrence since then.    He has long history of hypertension, a powerful risk factor for atrial fibrillation.  He is scheduled to be evaluated for sleep apnea which is reasonable, as in this age group sleep apnea is a common comorbidity.    With regard to rate versus rhythm control, I would favor continuing rate control with diltiazem, as the patient feels well on the medication and it is also doing a good job for his hypertension. He is currently on Dyazide and diltiazem with excellent BP control.    Unless a pattern of frequent atrial fibrillation develops, I would continue this regimen.  He mentioned that he predictably develops tachycardia during  exercise and, because of that, his has limited exercise.  I would like to confirm the type of arrhythmia he develops during exercise (I suspect atrial tachycardia or atrial fibrillation).  I have ordered a treadmill test.      RECOMMENDATIONS:    A.  Exercise stress test to assess for inducible arrhythmia.  B.  Continue Eliquis and diltiazem XT.  C.  Retrieve echocardiogram results from Dr. Ochoa' office.  D.  Proceed with formal polysomnography as scheduled.  E.  If he has recurrent arrhythmia in the near future, we would consider a trial of low-dose flecainide (he has rather significant resting bradycardia which may limit antiarrhythmic drug therapy) and/or discuss catheter ablation.  The patient said that, in general, he prefers to avoid chronic medication, especially if there is a good alternative option.      I appreciate the opportunity to be involved in this delightful patient's care.  Please free to contact me with any questions.      Jerry Hernandez MD, FACC         cc:  El Wray MD  [facility]  [address] [place] [address]    Jerry Hernandez MD        D: 2021   T: 2021   MT: theresa    Name:     VEE BOJORQUEZ  MRN:      [id number]        Account:      [id number]   :      1955           Service Date: 2021       Document: [id number]

## 2021-08-17 NOTE — LETTER
8/17/2021    El Wray MD  [address] [place] [address]    RE: Abdiaziz Kimball       Dear Colleague,    I had the pleasure of seeing Abdiaziz Kimball in the [facility].    HPI and Plan:   See dictation [id number]    Orders Placed This Encounter   Procedures     EKG 12-lead complete w/read - Clinics (future- to be scheduled)     EKG 12-lead complete w/read - Clinics (future- to be scheduled)     EKG 12-lead complete w/read - Clinics (future- to be scheduled)     EKG 12-lead complete w/read - Clinics (future- to be scheduled)     Exercise Stress Test - Adult       Orders Placed This Encounter   Medications     apixaban ANTICOAGULANT (ELIQUIS) 5 MG tablet     Sig: Take 5 mg by mouth 2 times daily       Medications Discontinued During This Encounter   Medication Reason     losartan (COZAAR) 25 MG tablet      amLODIPine (NORVASC) 2.5 MG tablet      Glucosamine-Chondroit-Vit C-Mn (GLUCOSAMINE CHONDROITINOITIN COMPLEX) CAPS      vitamin B complex with vitamin C (VITAMIN  B COMPLEX) TABS tablet      NUTRITIONAL SUPPLEMENTS PO          Encounter Diagnoses   Name Primary?     Atrial fibrillation (H)      Paroxysmal atrial fibrillation (H) Yes       CURRENT MEDICATIONS:  Current Outpatient Medications   Medication Sig Dispense Refill     apixaban ANTICOAGULANT (ELIQUIS) 5 MG tablet Take 5 mg by mouth 2 times daily       Coenzyme Q10 (CO Q-10) 200 MG CAPS Take  by mouth daily.       diltiazem ER COATED BEADS (CARDIZEM CD/CARTIA XT) 240 MG 24 hr capsule Take 1 capsule (240 mg) by mouth daily 30 capsule 0     potassium chloride ER (K-TAB) 20 MEQ CR tablet Take 0.5 tablets (10 mEq) by mouth daily 90 tablet 1     triamterene-HCTZ (DYAZIDE) 37.5-25 MG capsule Take 1 capsule by mouth daily 90 capsule 0       ALLERGIES     Allergies   Allergen Reactions     Penicillins Anaphylaxis and Unknown     Rash, shortness of breath, swelling     Lisinopril Cough      02/19/21 -- reviewed paper chart seeking info/reason lisinopril was stopped. Found note from to Dr. Wray from 9/2009 reporting stopped lisinopril due to cough. Had experimented going off a few times over some time and reported in note that every time he tries the lisinopril it causes a cough.      Metoprolol      Made BP go higher     Chlorthalidone Rash, Blisters and Dermatitis       PAST MEDICAL HISTORY:  Past Medical History:   Diagnosis Date     Dry skin 12/4/2014     Foot pain 11/6/2012    OA of both feet      HTN (hypertension)     Renal artery US neg for stenosis 2008.     HTN, goal below 140/90      Hyperlipidaemia LDL goal < 100      Hypokalemia 11/17/2011     Renal cyst 01/25/2008    Small. Seen on renal US.       PAST SURGICAL HISTORY:  Past Surgical History:   Procedure Laterality Date     ARTHROSCOPY KNEE RT/LT Left 1990's     ARTHROSCOPY KNEE RT/LT Left      ARTHROSCOPY KNEE RT/LT Left     Mark Quintero     ARTHROSCOPY KNEE RT/LT Left 2003ish    Mark Quintero     ARTHROSCOPY KNEE RT/LT Right 2005    Mark Quintero     HERNIA REPAIR, INGUINAL RT/LT Right 2004    Incarerated hernia with resection, Dr. Moraes     VASECTOMY  1988       FAMILY HISTORY:  History reviewed. No pertinent family history.    SOCIAL HISTORY:  Social History     Socioeconomic History     Marital status:      Spouse name: None     Number of children: None     Years of education: None     Highest education level: None   Occupational History     None   Tobacco Use     Smoking status: Never Smoker     Smokeless tobacco: Never Used   Substance and Sexual Activity     Alcohol use: Yes     Alcohol/week: 0.0 - 1.0 standard drinks     Comment: ocassional     Drug use: No     Sexual activity: Not Currently     Partners: Female   Other Topics Concern     None   Social History Narrative     None     Social Determinants of Health     Financial Resource Strain:      Difficulty of Paying Living Expenses:    Food Insecurity:       Worried About Running Out of Food in the Last Year:      Ran Out of Food in the Last Year:    Transportation Needs:      Lack of Transportation (Medical):      Lack of Transportation (Non-Medical):    Physical Activity:      Days of Exercise per Week:      Minutes of Exercise per Session:    Stress:      Feeling of Stress :    Social Connections:      Frequency of Communication with Friends and Family:      Frequency of Social Gatherings with Friends and Family:      Attends Hinduism Services:      Active Member of Clubs or Organizations:      Attends Club or Organization Meetings:      Marital Status:    Intimate Partner Violence:      Fear of Current or Ex-Partner:      Emotionally Abused:      Physically Abused:      Sexually Abused:        Review of Systems:  Skin:  Negative       Eyes:  Positive for glasses    ENT:  Negative      Respiratory:  Positive for shortness of breath with a fib   Cardiovascular:    Positive for;lightheadedness afib  Gastroenterology: Negative      Genitourinary:  Negative      Musculoskeletal:  Negative      Neurologic:  Negative      Psychiatric:  Negative      Heme/Lymph/Imm:  Negative      Endocrine:  Negative                      Thank you for allowing me to participate in the care of your patient.      Sincerely,     Jerry Hernandez MD     [facility]  cc:   No referring provider defined for this encounter.

## 2021-08-20 ENCOUNTER — TELEPHONE (OUTPATIENT)
Dept: CARDIOLOGY | Facility: CLINIC | Age: 66
End: 2021-08-20

## 2021-08-20 DIAGNOSIS — I48.0 PAROXYSMAL ATRIAL FIBRILLATION (H): Primary | ICD-10-CM

## 2021-08-20 NOTE — TELEPHONE ENCOUNTER
Message from scheduling about pt needing an echo.  Spoke to who states that Dr Hernandez thought that pt had an echo done, but pt stated that he has not. Will make Dr Hernandez aware that there is no echo done and would he like one ordered. Ayaan

## 2021-08-20 NOTE — TELEPHONE ENCOUNTER
I though he had an echo- because he said so...  Anyway, yes, Varun needs an echo-, before or after his stress test.  DI

## 2021-08-30 ENCOUNTER — TRANSFERRED RECORDS (OUTPATIENT)
Dept: FAMILY MEDICINE | Facility: CLINIC | Age: 66
End: 2021-08-30

## 2021-09-05 ENCOUNTER — HEALTH MAINTENANCE LETTER (OUTPATIENT)
Age: 66
End: 2021-09-05

## 2021-09-08 ENCOUNTER — HOSPITAL ENCOUNTER (OUTPATIENT)
Dept: CARDIOLOGY | Facility: CLINIC | Age: 66
Discharge: HOME | End: 2021-09-08
Attending: INTERNAL MEDICINE | Admitting: INTERNAL MEDICINE
Payer: COMMERCIAL

## 2021-09-08 DIAGNOSIS — I48.0 PAROXYSMAL ATRIAL FIBRILLATION (H): ICD-10-CM

## 2021-09-08 PROCEDURE — 93018 CV STRESS TEST I&R ONLY: CPT | Performed by: INTERNAL MEDICINE

## 2021-09-08 PROCEDURE — 93017 CV STRESS TEST TRACING ONLY: CPT

## 2021-09-08 PROCEDURE — 93016 CV STRESS TEST SUPVJ ONLY: CPT | Performed by: INTERNAL MEDICINE

## 2021-09-13 ENCOUNTER — HOSPITAL ENCOUNTER (OUTPATIENT)
Dept: CARDIOLOGY | Facility: CLINIC | Age: 66
Discharge: HOME | End: 2021-09-13
Attending: INTERNAL MEDICINE | Admitting: INTERNAL MEDICINE
Payer: COMMERCIAL

## 2021-09-13 DIAGNOSIS — I48.0 PAROXYSMAL ATRIAL FIBRILLATION (H): ICD-10-CM

## 2021-09-13 LAB — LVEF ECHO: NORMAL

## 2021-09-13 PROCEDURE — 93306 TTE W/DOPPLER COMPLETE: CPT | Mod: 26 | Performed by: INTERNAL MEDICINE

## 2021-09-13 PROCEDURE — 93306 TTE W/DOPPLER COMPLETE: CPT

## 2021-09-20 PROBLEM — Z47.89 AFTERCARE FOLLOWING SURGERY OF THE MUSCULOSKELETAL SYSTEM: Status: RESOLVED | Noted: 2021-02-22 | Resolved: 2021-09-20

## 2021-09-20 NOTE — PROGRESS NOTES
Discharge Note    Progress reporting period is from last progress note on 04/12/21 to May 10, 2021.    Abdiaziz failed to follow up and current status is unknown.  Please see information below for last relevant information on current status.  Patient seen for 11 visits.    SUBJECTIVE  Subjective changes noted by patient:  MASON toes isn't feeling too bad. Does still have days in which he overdoes it, but going to bed and wakes up the next morning and it feels fine.  .  Current pain level is  .     Previous pain level was  4/10.   Changes in function:  Yes (See Goal flowsheet attached for changes in current functional level)  Adverse reaction to treatment or activity: None    OBJECTIVE  Changes noted in objective findings: Figure 8 L: 61.25 cm, Metatarsal head: 26.25cm, AROM L Great Toe Ext: 15 (18 after), Flx: 42,     ASSESSMENT/PLAN  Diagnosis: post op L foot   Updated problem list and treatment plan:   Pain - HEP  Decreased ROM/flexibility - HEP  Decreased function - HEP  Impaired muscle performance - HEP  Impaired gait - HEP  Impaired balance - HEP  STG/LTGs have been met or progress has been made towards goals:  Yes, please see goal flowsheet for most current information  Assessment of Progress: current status is unknown.    Last current status: Pt is progressing slower than anticipated   Self Management Plans:  HEP  I have re-evaluated this patient and find that the nature, scope, duration and intensity of the therapy is appropriate for the medical condition of the patient.  Abdiaziz continues to require the following intervention to meet STG and LTG's:  HEP.    Recommendations:  Discharge with current home program.  Patient to follow up with MD as needed.    Please refer to the daily flowsheet for treatment today, total treatment time and time spent performing 1:1 timed codes.

## 2021-09-21 ENCOUNTER — OFFICE VISIT (OUTPATIENT)
Dept: VASCULAR SURGERY | Facility: CLINIC | Age: 66
End: 2021-09-21
Payer: COMMERCIAL

## 2021-09-21 DIAGNOSIS — I83.813 VARICOSE VEINS OF BILATERAL LOWER EXTREMITIES WITH PAIN: Primary | ICD-10-CM

## 2021-09-21 PROCEDURE — 99213 OFFICE O/P EST LOW 20 MIN: CPT | Performed by: SURGERY

## 2021-09-21 NOTE — PROGRESS NOTES
Abdiaziz Kimball is a 65-year-old  previously evaluated by me through this office in 2017 for symptomatic left leg varicosities.  Left leg venous competency study performed at that time showed incompetence of the left greater saphenous vein from the saphenofemoral junction to the mid calf.  The vein measured 9.6 mm at the junction and 6.7 mm distally.  It gave rise to a 9.2 mm varicosity in the proximal calf.  After lengthy discussion Chicho decided to manage his veins nonoperatively.  He wears knee-high compression stockings of 20-30 mmHg pressure on a daily basis.    This past January he underwent extensive left foot surgery for arthritic changes.  He has complained of worsening left foot and ankle edema with some associated calf pain.  His orthopedic surgeon, Dr. Gomez suggested that he return for reevaluation of left leg venous issues.  Cihcho therefore presents today for that reevaluation.  He continues to wear knee-high compression stockings of 20-30 mmHg pressure on a daily basis.  Past medical history is also notable for the diagnosis of atrial fibrillation for which he is on Eliquis.    Exam:  Well-developed male alert and oriented x3 in no acute distress.  Moderate ropey varicosities throughout the medial aspect of the left calf.  Trace left ankle edema.  Surgical changes of the left foot.  Easily palpable DP pulses bilaterally.    ASSESSMENT:  Symptomatic left leg varicosities (left ankle swelling and left calf aching) with documented incompetence of the left greater saphenous vein.  His symptoms persist despite wearing knee-high compression stockings of 20-30 mmHg pressure on a daily basis.      RECOMMENDATION:  I reviewed all the above with Varun.  He will continue to wear his knee-high compression stockings.  I will repeat his left leg venous competency exam and meet with him afterwards to discuss those results and his treatment options.    Total length of this encounter was 20  minutes.    Macho Greenwood MD

## 2021-09-21 NOTE — LETTER
9/21/2021         RE: Abdiaziz Kimball  [address] [address]        Dear Colleague,    Thank you for referring your patient, Abdiaziz Kimball, to the [facility] [place]. Please see a copy of my visit note below.    Abdiaziz Kimball is a 65-year-old  previously evaluated by me through this office in 2017 for symptomatic left leg varicosities.  Left leg venous competency study performed at that time showed incompetence of the left greater saphenous vein from the saphenofemoral junction to the mid calf.  The vein measured 9.6 mm at the junction and 6.7 mm distally.  It gave rise to a 9.2 mm varicosity in the proximal calf.  After lengthy discussion Chicho decided to manage his veins nonoperatively.  He wears knee-high compression stockings of 20-30 mmHg pressure on a daily basis.    This past January he underwent extensive left foot surgery for arthritic changes.  He has complained of worsening left foot and ankle edema with some associated calf pain.  His orthopedic surgeon, Dr. Gomez suggested that he return for reevaluation of left leg venous issues.  Chicho therefore presents today for that reevaluation.  He continues to wear knee-high compression stockings of 20-30 mmHg pressure on a daily basis.  Past medical history is also notable for the diagnosis of atrial fibrillation for which he is on Eliquis.    Exam:  Well-developed male alert and oriented x3 in no acute distress.  Moderate ropey varicosities throughout the medial aspect of the left calf.  Trace left ankle edema.  Surgical changes of the left foot.  Easily palpable DP pulses bilaterally.    ASSESSMENT:  Symptomatic left leg varicosities (left ankle swelling and left calf aching) with documented incompetence of the left greater saphenous vein.  His symptoms persist despite wearing knee-high compression stockings of 20-30 mmHg pressure on a daily basis.      RECOMMENDATION:  I reviewed all the  above with Varun.  He will continue to wear his knee-high compression stockings.  I will repeat his left leg venous competency exam and meet with him afterwards to discuss those results and his treatment options.    Total length of this encounter was 20 minutes.    Macho Greenwood MD        Again, thank you for allowing me to participate in the care of your patient.        Sincerely,        Adama Greenwood MD

## 2021-11-02 ENCOUNTER — ANCILLARY PROCEDURE (OUTPATIENT)
Dept: ULTRASOUND IMAGING | Facility: CLINIC | Age: 66
End: 2021-11-02
Attending: SURGERY
Payer: COMMERCIAL

## 2021-11-02 ENCOUNTER — OFFICE VISIT (OUTPATIENT)
Dept: VASCULAR SURGERY | Facility: CLINIC | Age: 66
End: 2021-11-02
Attending: SURGERY
Payer: COMMERCIAL

## 2021-11-02 DIAGNOSIS — I83.813 VARICOSE VEINS OF BILATERAL LOWER EXTREMITIES WITH PAIN: ICD-10-CM

## 2021-11-02 DIAGNOSIS — I83.812 VARICOSE VEINS OF LEG WITH PAIN, LEFT: Primary | ICD-10-CM

## 2021-11-02 PROCEDURE — 93971 EXTREMITY STUDY: CPT | Mod: LT | Performed by: SURGERY

## 2021-11-02 PROCEDURE — 99214 OFFICE O/P EST MOD 30 MIN: CPT | Performed by: SURGERY

## 2021-11-02 NOTE — LETTER
2021         RE: Abdiaziz Kimball  [address] [facility] [address]        Dear Colleague,    Thank you for referring your patient, Abdiaziz Kimball, to the [facility] [place]. Please see a copy of my visit note below.    Abdiaziz Kimball is a 65-year-old  previously evaluated by me for symptomatic left leg varicosities.  Please see my detailed office note from his 2021 office visit.  He has been wearing knee-high compression stockings for years.  He is on his feet throughout the day and complains of worsening left leg pain and edema at the end of a work shift.  He elevates his leg and at times his symptoms interfere with his job performance.  He is status post extensive left foot surgery for arthritic changes in 2021 by Dr. Neil Gomez.  He is scheduled to have placement of an artificial right toe joint in late November of this year.  Past medical history is also notable for atrial fibrillation for which he is on Eliquis.    Exam:  Well-developed male alert and oriented x3.  Moderate ropey varicosities throughout the medial and anterior aspect of the left calf.  Trace left ankle edema.  Surgical changes of the left foot.  Easily palpable DP pulses bilaterally.    Imaging:  Aiyana May on 2021  9:09 AM   Name:  Abdiaziz Kimball                                                         Patient ID: [id number]  Date: 2021                                                        : 1955  Sex: male                                                                                Examined by: RODRI May RVT  Age:  65 year old                                                                     Reading MD: FLOYD     INDICATION:  Varicose veins of left lower extremity with pain.     EXAM TYPE  LEFT LOWER EXTREMITY VENOUS DUPLEX FOR VENOUS INSUFFICIENCY TECHNICAL SUMMARY     A duplex ultrasound study using color flow was performed to  evaluate the left lower extremity veins for valvular incompetence with the patient in a steep reversed trendelenberg.      LEFT:     The deep veins demonstrate phasic flow, compress, and respond to augmentations.  There is no reflux or DVT.  The common femoral vein is incompetent and free of thrombus. The remaining deep veins are competent and free of thrombus.      The GSV demonstrates phasic flow, compresses, and responds to augmentations from the saphenofemoral junction to the ankle with no evidence of thrombus. The great saphenous vein measures 7.3 mm at the saphenofemoral junction, 7.8 mm in the proximal thigh, and 6.3 mm at the knee. The GSV is incompetent from  the SFJ to the mid calf with the greatest reflux time of 3959 milliseconds.  The GSV gives rise to multiple incompetent varicose veins, the largest measures 9.6 mm off the proximal calf that courses medially with a reflux time of 3200 milliseconds.      The AASV is competent ( 1.9 mm) draining into the saphenofemoral junction.      The Giacomini vein is competent ( 2.8 mm) communicating with the small saphenous vein at the knee level.      The SSV demonstrates phasic flow, compresses, and responds to augmentations from the popliteal space to the ankle.  No thrombus is seen. The saphenopopliteal junction is absent. The SSV is incompetent from the mid to distal calf with a reflux time of 4933 milliseconds.  The SSV gives rise to a varicose branch measuring 5.3 mm off the mid calf that courses medially to join a GSV tributary and demonstrates a reflux time of 2293 milliseconds.      Perforators:  There is an incompetent  vein ( 3.5 mm) at 24 cm from medial malleolus that communicates with the GSV.      RIGHT:     The CFV demonstrates phasic flow, compresses, responds to augmentations, and is incompetent.  There is no DVT.       FINAL SUMMARY:  1.         No evidence of left lower extremity deep vein thrombus.  2.         Bilateral common femoral  vein incompetence.  3.         Left great saphenous vein incompetence.  4.         Left small saphenous vein incompetence.  5.          Left incompetent  vein.  6.         Left varicose vein incompetence.  7.         Superficial and perforating veins time of incompetence is > 500 ms and >1000 ms for deep vein incompetence         IMPRESSION:  1.  Symptomatic left leg varicosities secondary to incompetence of the left greater saphenous vein from the saphenofemoral junction to the mid calf.  The vein measures 7.3 mm at the junction and 6.3 mm at the knee.  Greatest reflux time is 3959 ms.  The GSV gives rise to multiple incompetent varicose veins in the left calf the largest of which measures 9.6 mm in diameter.  His symptoms interfere with his employment despite wearing medical grade knee-high compression stockings for years.  I consider him to be a failure of conservative therapy.    2.  Planned placement of an artificial joint in his right foot later this month.    RECOMMENDATION:  I reviewed all the above with Varun.  Anatomically he is a good candidate for radiofrequency ablation of the left greater saphenous vein.  That vein is superficial in the proximal thigh and I will need to take care to avoid thermal injury at that level.  I thoroughly discussed the specifics of this procedure including potential complications and the anticipated postoperative course.  He is also interested in removal of left leg prominent varicosities.  He understands that the stab phlebectomy portion of this case would be considered cosmetic.  I would ask him to hold his Xarelto for 24 hours prior to the procedure.  Assuming it is performed after placement of his artificial right foot joint he will also require clindamycin 600 mg p.o. prophylactically 1 hour prior to the procedure.    All of his questions were answered and he verbalizes full understanding to the above and complete agreement with this management plan.    Total  length of this encounter was 30 minutes.    Macho Greenwood MD        Again, thank you for allowing me to participate in the care of your patient.        Sincerely,        Adama Greenwood MD

## 2021-11-02 NOTE — PROGRESS NOTES
Abdiaziz Kimball is a 65-year-old  previously evaluated by me for symptomatic left leg varicosities.  Please see my detailed office note from his 2021 office visit.  He has been wearing knee-high compression stockings for years.  He is on his feet throughout the day and complains of worsening left leg pain and edema at the end of a work shift.  He elevates his leg and at times his symptoms interfere with his job performance.  He is status post extensive left foot surgery for arthritic changes in 2021 by Dr. Neil Gomez.  He is scheduled to have placement of an artificial right toe joint in late November of this year.  Past medical history is also notable for atrial fibrillation for which he is on Eliquis.    Exam:  Well-developed male alert and oriented x3.  Moderate ropey varicosities throughout the medial and anterior aspect of the left calf.  Trace left ankle edema.  Surgical changes of the left foot.  Easily palpable DP pulses bilaterally.    Imaging:  Aiyana May on 2021  9:09 AM   Name:  Abdiaziz Kimball                                                         Patient ID: [id number]  Date: 2021                                                        : 1955  Sex: male                                                                                Examined by: RODRI May RVT  Age:  65 year old                                                                     Reading MD: FLOYD     INDICATION:  Varicose veins of left lower extremity with pain.     EXAM TYPE  LEFT LOWER EXTREMITY VENOUS DUPLEX FOR VENOUS INSUFFICIENCY TECHNICAL SUMMARY     A duplex ultrasound study using color flow was performed to evaluate the left lower extremity veins for valvular incompetence with the patient in a steep reversed trendelenberg.      LEFT:     The deep veins demonstrate phasic flow, compress, and respond to augmentations.  There is no reflux or DVT.  The common femoral  vein is incompetent and free of thrombus. The remaining deep veins are competent and free of thrombus.      The GSV demonstrates phasic flow, compresses, and responds to augmentations from the saphenofemoral junction to the ankle with no evidence of thrombus. The great saphenous vein measures 7.3 mm at the saphenofemoral junction, 7.8 mm in the proximal thigh, and 6.3 mm at the knee. The GSV is incompetent from  the SFJ to the mid calf with the greatest reflux time of 3959 milliseconds.  The GSV gives rise to multiple incompetent varicose veins, the largest measures 9.6 mm off the proximal calf that courses medially with a reflux time of 3200 milliseconds.      The AASV is competent ( 1.9 mm) draining into the saphenofemoral junction.      The Giacomini vein is competent ( 2.8 mm) communicating with the small saphenous vein at the knee level.      The SSV demonstrates phasic flow, compresses, and responds to augmentations from the popliteal space to the ankle.  No thrombus is seen. The saphenopopliteal junction is absent. The SSV is incompetent from the mid to distal calf with a reflux time of 4933 milliseconds.  The SSV gives rise to a varicose branch measuring 5.3 mm off the mid calf that courses medially to join a GSV tributary and demonstrates a reflux time of 2293 milliseconds.      Perforators:  There is an incompetent  vein ( 3.5 mm) at 24 cm from medial malleolus that communicates with the GSV.      RIGHT:     The CFV demonstrates phasic flow, compresses, responds to augmentations, and is incompetent.  There is no DVT.       FINAL SUMMARY:  1.         No evidence of left lower extremity deep vein thrombus.  2.         Bilateral common femoral vein incompetence.  3.         Left great saphenous vein incompetence.  4.         Left small saphenous vein incompetence.  5.          Left incompetent  vein.  6.         Left varicose vein incompetence.  7.         Superficial and perforating veins  time of incompetence is > 500 ms and >1000 ms for deep vein incompetence         IMPRESSION:  1.  Symptomatic left leg varicosities secondary to incompetence of the left greater saphenous vein from the saphenofemoral junction to the mid calf.  The vein measures 7.3 mm at the junction and 6.3 mm at the knee.  Greatest reflux time is 3959 ms.  The GSV gives rise to multiple incompetent varicose veins in the left calf the largest of which measures 9.6 mm in diameter.  His symptoms interfere with his employment despite wearing medical grade knee-high compression stockings for years.  I consider him to be a failure of conservative therapy.    2.  Planned placement of an artificial joint in his right foot later this month.    RECOMMENDATION:  I reviewed all the above with Varun.  Anatomically he is a good candidate for radiofrequency ablation of the left greater saphenous vein.  That vein is superficial in the proximal thigh and I will need to take care to avoid thermal injury at that level.  I thoroughly discussed the specifics of this procedure including potential complications and the anticipated postoperative course.  He is also interested in removal of left leg prominent varicosities.  He understands that the stab phlebectomy portion of this case would be considered cosmetic.  I would ask him to hold his Eliquis for 24 hours prior to the procedure.  Assuming it is performed after placement of his artificial right foot joint he will also require clindamycin 600 mg p.o. prophylactically 1 hour prior to the procedure.    All of his questions were answered and he verbalizes full understanding to the above and complete agreement with this management plan.    Total length of this encounter was 30 minutes.    Macho Greenwood MD

## 2021-11-08 NOTE — PATIENT INSTRUCTIONS

## 2021-11-08 NOTE — PROGRESS NOTES
[facility] [address] [place] [address]  Phone: [phone]  Fax: [phone]  Primary Provider: Bertin Wray  Pre-op Performing Provider: BERTIN WRAY      PREOPERATIVE EVALUATION:  Today's date: 11/9/2021    Abdiaziz Kimball is a 65 year old male who presents for a preoperative evaluation.    Surgical Information:   Surgery/Procedure: RIGHT CHEILECTOMY WITH SOFT TISSUE ARTHROPLASTY, CALCANEOCUBOID LIGAMENT REPAIR    Surgery Location: [facility]  Surgeon: Dr Gomez  Surgery Date: 11/30/2021  Time of Surgery: 1115  Where patient plans to recover: At home with family  Fax number for surgical facility:     Type of Anesthesia Anticipated: to be determined    Preoperative Questionnaire:   No - Have you ever had a heart attack or stroke?  No - Have you ever had surgery on your heart or blood vessels, such as a stent, coronary (heart) bypass, or surgery on an artery in the head, neck, heart, or legs?  No - Do you have chest pain when you are physically active?  No - Do you have a history of heart failure?  No - Do you currently have a cold, bronchitis, or symptoms of other respiratory (head and chest) infections?  No - Do you have a cough, shortness of breath, or wheezing?  YES - Do you or anyone in your family have a history of blood clots? Mother  No - Do you or anyone in your family have a serious bleeding problem, such as long-lasting bleeding after surgeries or cuts?  No - Have you ever had anemia or been told to take iron pills?  No - Have you had any abnormal blood loss such as black, tarry or bloody stools, or abnormal vaginal bleeding?  No - Have you ever had a blood transfusion?  Yes - Are you willing to have a blood transfusion if it is medically needed before, during, or after your surgery?  YES - Have you or anyone in your family ever had problems with anesthesia (sedation for surgery)? Slow to come out of sedation  YES - Do you have sleep apnea,  excessive snoring, or daytime drowsiness?  Sleep apnea  No - Do you have any artifical heart valves or other implanted medical devices, such as a pacemaker, defibrillator, or continuous glucose monitor?  YES - Do you have any artifical joints? Lt big toe  No - Are you allergic to latex?  No - Is there any chance that you may be pregnant?        Assessment & Plan     The proposed surgical procedure is considered LOW risk.    Preop general physical exam    Pain of toe of right foot  Now ready for the right foot repair after successful repair of the left 8 months ago    Varicose veins of both legs with edema  Known issue that may need repair down the road.    Primary hypertension  Essential Hypertension   Remains well controlled when checked out of clinic.   he has not experienced any significant side effects from medications for hypertension.    NO active cardiac complaints or symptoms with exercise.  Current medications for treatment:  See list    Reviewed last 6 BP readings in chart:  BP Readings from Last 6 Encounters:   11/09/21 117/74   08/17/21 130/85   06/24/21 (!) 117/92   05/12/21 136/84   04/01/21 138/74   01/05/21 138/78     Paroxysmal Afib        Benign prostatic hyperplasia with nocturia  Gets up most nights    SUHA (obstructive sleep apnea)  New machine, anxious for better control.          Risks and Recommendations:  The patient has the following additional risks and recommendations for perioperative complications:   - No identified additional risk factors other than previously addressed    Medication Instructions:  Patient is to take all scheduled medications on the day of surgery EXCEPT for modifications listed below:   - apixaban (Eliquis), edoxaban (Savaysa), rivaroxaban (Xarelto): Neuraxial or regional block anticipated AND CrCL (>=) 50mL/min. HOLD 3 days before surgery.    - Calcium Channel Blockers: May be continued on the day of surgery.    RECOMMENDATION:  APPROVAL GIVEN to proceed with proposed  procedure, without further diagnostic evaluation.    Review of external notes as documented above       Subjective     HPI related to upcoming procedure: Now ready for the right foot repair after successful repair of the left 8 months ago        Health Care Directive:  Patient does not have a Health Care Directive or Living Will: As is reasonable care for most folks, In the short term, he wants usual aggressive medical care.   No desire for long term prolongation of life through artificial means if no hope to bring back to a reasonable status.      Preoperative Review of :   reviewed - no record of controlled substances prescribed.      Status of Chronic Conditions:  See problem list for active medical problems.  Problems all longstanding and stable, except as noted/documented.  See ROS for pertinent symptoms related to these conditions.      Review of Systems  Constitutional, neuro, ENT, endocrine, pulmonary, cardiac, gastrointestinal, genitourinary, musculoskeletal, integument and psychiatric systems are negative, except as otherwise noted.    Patient Active Problem List    Diagnosis Date Noted     SUHA (obstructive sleep apnea) 11/09/2021     Priority: Medium     Benign prostatic hyperplasia with nocturia 03/05/2018     Priority: Medium     Encounter for long-term (current) use of high-risk medication 01/31/2018     Priority: Medium     Varicose veins of both legs with edema 06/13/2017     Priority: Medium     Dry skin 12/04/2014     Priority: Medium     Health Care Home 02/04/2014     Priority: Medium     State Tier Level:  Tier 1   December 4, 2014           Advanced directives, counseling/discussion 11/06/2012     Priority: Medium     As is reasonable care for Most of us, wants in the Short term aggressive care.   No desire for long term prolongation of life through artificial means if no hope to bring back to a reasonable status.          Hypokalemia 11/17/2011     Priority: Medium     HTN (hypertension)  10/14/2011     Priority: Medium      Past Medical History:   Diagnosis Date     Dry skin 12/4/2014     Foot pain 11/6/2012    OA of both feet      HTN (hypertension)     Renal artery US neg for stenosis 2008.     HTN, goal below 140/90      Hyperlipidaemia LDL goal < 100      Hypokalemia 11/17/2011     Renal cyst 01/25/2008    Small. Seen on renal US.     Past Surgical History:   Procedure Laterality Date     ARTHROSCOPY KNEE RT/LT Left 1990's     ARTHROSCOPY KNEE RT/LT Left      ARTHROSCOPY KNEE RT/LT Left     Leon, Mark     ARTHROSCOPY KNEE RT/LT Left 2003ish    Leon, Mark     ARTHROSCOPY KNEE RT/LT Right 2005    Mark Quintero     HERNIA REPAIR, INGUINAL RT/LT Right 2004    Incarerated hernia with resection, Dr. Moraes     VASECTOMY  1988     Current Outpatient Medications   Medication Sig Dispense Refill     apixaban ANTICOAGULANT (ELIQUIS) 5 MG tablet Take 5 mg by mouth 2 times daily       Coenzyme Q10 (CO Q-10) 200 MG CAPS Take  by mouth daily.       diltiazem ER COATED BEADS (CARDIZEM CD/CARTIA XT) 240 MG 24 hr capsule Take 1 capsule (240 mg) by mouth daily 30 capsule 0     MISC NATURAL PRODUCTS PO Total restore       potassium chloride ER (K-TAB) 20 MEQ CR tablet Take 0.5 tablets (10 mEq) by mouth daily 90 tablet 1     triamterene-HCTZ (DYAZIDE) 37.5-25 MG capsule Take 1 capsule by mouth daily (Patient taking differently: Take 1 capsule by mouth daily 75-50mg) 90 capsule 0       Allergies   Allergen Reactions     Penicillins Anaphylaxis and Unknown     Rash, shortness of breath, swelling     Amlodipine      Hytrin [Terazosin]      Lisinopril Cough     02/19/21 -- reviewed paper chart seeking info/reason lisinopril was stopped. Found note from to Dr. Wray from 9/2009 reporting stopped lisinopril due to cough. Had experimented going off a few times over some time and reported in note that every time he tries the lisinopril it causes a cough.      Losartan      Metoprolol      Made BP go higher      Chlorthalidone Rash, Blisters and Dermatitis        Social History     Tobacco Use     Smoking status: Never Smoker     Smokeless tobacco: Never Used   Substance Use Topics     Alcohol use: Yes     Alcohol/week: 0.0 - 1.0 standard drinks     Comment: ocassional     Family History   Problem Relation Age of Onset     Hypertension Brother      Heart Disease Brother      Diabetes Brother      No Known Problems Son      No Known Problems Son      No Known Problems Daughter      History   Drug Use No         Objective     /74   Pulse 57   Wt 88 kg (194 lb)   SpO2 99%   BMI 27.84 kg/m      Physical Exam    GENERAL APPEARANCE: healthy, alert and no distress     EYES: EOMI,  PERRL     HENT: ear canals and TM's normal and nose and mouth without ulcers or lesions     NECK: no adenopathy, no asymmetry, masses, or scars and thyroid normal to palpation     RESP: lungs clear to auscultation - no rales, rhonchi or wheezes     CV: regular rates and rhythm, normal S1 S2, no S3 or S4 and no murmur, click or rub     ABDOMEN:  soft, nontender, no HSM or masses and bowel sounds normal     MS: extremities normal- no gross deformities noted, no evidence of inflammation in joints, FROM in all extremities.     SKIN: no suspicious lesions or rashes     NEURO: Normal strength and tone, sensory exam grossly normal, mentation intact and speech normal     PSYCH: mentation appears normal. and affect normal/bright     LYMPHATICS: No cervical adenopathy    Recent Labs   Lab Test 07/21/21  0823 06/24/21  1918 05/12/21  1028 05/12/21  0000   HGB  --  14.7  --  13.5   PLT  --  253  --  214    140   < >  --    POTASSIUM 3.3* 3.7   < >  --    CR 1.46* 1.70*   < >  --     < > = values in this interval not displayed.        Diagnostics:  Labs pending at this time.  Results will be reviewed when available.   No EKG required for low risk surgery (cataract, skin procedure, breast biopsy, etc).    Revised Cardiac Risk Index (RCRI):  The  patient has the following serious cardiovascular risks for perioperative complications:   - No serious cardiac risks = 0 points     RCRI Interpretation: 0 points: Class I (very low risk - 0.4% complication rate)           Signed Electronically by: El Wray MD  Copy of this evaluation report is provided to requesting physician.

## 2021-11-09 ENCOUNTER — OFFICE VISIT (OUTPATIENT)
Dept: FAMILY MEDICINE | Facility: CLINIC | Age: 66
End: 2021-11-09

## 2021-11-09 VITALS
DIASTOLIC BLOOD PRESSURE: 74 MMHG | BODY MASS INDEX: 27.84 KG/M2 | OXYGEN SATURATION: 99 % | WEIGHT: 194 LBS | SYSTOLIC BLOOD PRESSURE: 117 MMHG | HEART RATE: 57 BPM

## 2021-11-09 DIAGNOSIS — I83.893 VARICOSE VEINS OF BOTH LEGS WITH EDEMA: ICD-10-CM

## 2021-11-09 DIAGNOSIS — M79.674 PAIN OF TOE OF RIGHT FOOT: ICD-10-CM

## 2021-11-09 DIAGNOSIS — I48.0 PAROXYSMAL ATRIAL FIBRILLATION (H): ICD-10-CM

## 2021-11-09 DIAGNOSIS — N40.1 BENIGN PROSTATIC HYPERPLASIA WITH NOCTURIA: ICD-10-CM

## 2021-11-09 DIAGNOSIS — I10 PRIMARY HYPERTENSION: ICD-10-CM

## 2021-11-09 DIAGNOSIS — Z12.5 SCREENING FOR PROSTATE CANCER: ICD-10-CM

## 2021-11-09 DIAGNOSIS — R35.1 BENIGN PROSTATIC HYPERPLASIA WITH NOCTURIA: ICD-10-CM

## 2021-11-09 DIAGNOSIS — G47.33 OSA (OBSTRUCTIVE SLEEP APNEA): ICD-10-CM

## 2021-11-09 DIAGNOSIS — Z01.818 PREOP GENERAL PHYSICAL EXAM: Primary | ICD-10-CM

## 2021-11-09 PROBLEM — D12.0 BENIGN NEOPLASM OF CECUM: Status: ACTIVE | Noted: 2021-09-29

## 2021-11-09 PROBLEM — D12.2 BENIGN NEOPLASM OF ASCENDING COLON: Status: ACTIVE | Noted: 2021-09-29

## 2021-11-09 PROBLEM — K63.5 POLYP OF COLON: Status: ACTIVE | Noted: 2021-09-27

## 2021-11-09 PROCEDURE — 36415 COLL VENOUS BLD VENIPUNCTURE: CPT | Performed by: FAMILY MEDICINE

## 2021-11-09 PROCEDURE — 99215 OFFICE O/P EST HI 40 MIN: CPT | Performed by: FAMILY MEDICINE

## 2021-11-09 NOTE — LETTER
[facility] [address] [place]  [address]  Phone: [phone]    November 17, 2021      Abdiaziz Kimball  [address] [facility] [address]              Dear Abdiaziz,     I am writing to report that your included test results are within expected ranges. I do not suggest that we make any changes at this time.     El Wray M.D./david       Results for orders placed or performed in visit on 11/09/21   Comp. Metabolic Panel (14) ([facility])     Status: Abnormal   Result Value Ref Range    Glucose 89 65 - 99 mg/dL    Urea Nitrogen 32 (H) 8 - 27 mg/dL    Creatinine 1.47 (H) 0.76 - 1.27 mg/dL    eGFR If NonAfricn Am 49 (L) >59 mL/min/1.73    eGFR If Africn Am 56 (L) >59 mL/min/1.73    BUN/Creatinine Ratio 22 10 - 24    Sodium 143 134 - 144 mmol/L    Potassium 3.6 3.5 - 5.2 mmol/L    Chloride 104 96 - 106 mmol/L    Total CO2 25 20 - 29 mmol/L    Calcium 8.8 8.6 - 10.2 mg/dL    Protein Total 7.0 6.0 - 8.5 g/dL    Albumin 4.4 3.8 - 4.8 g/dL    Globulin, Total 2.6 1.5 - 4.5 g/dL    A/G Ratio 1.7 1.2 - 2.2    Bilirubin Total 0.7 0.0 - 1.2 mg/dL    Alkaline Phosphatase 100 44 - 121 IU/L    AST 13 0 - 40 IU/L    ALT 11 0 - 44 IU/L    Narrative    Performed at:  01 - [facility] [address], [place], [place]  [id number]  : Sonny Collins MD, Phone:  [phone]   Lipid Panel ([facility])     Status: Abnormal   Result Value Ref Range    Cholesterol 204 (H) 100 - 199 mg/dL    Triglycerides 153 (H) 0 - 149 mg/dL    HDL Cholesterol 37 (L) >39 mg/dL    VLDL Cholesterol Phi 28 5 - 40 mg/dL    LDL Cholesterol Calculated 139 (H) 0 - 99 mg/dL    LDL/HDL Ratio 3.8 (H) 0.0 - 3.6 ratio    Narrative    Performed at:  01 - [facility] [address], [place], [place]  [id number]  : Sonny Collins MD, Phone:  [phone]   PSA Serum ([facility])     Status: None   Result Value Ref Range    PSA NG/ML 1.2 0.0 - 4.0 ng/mL    Narrative    Performed at:  01 - [facility] [address]  [place], [place]  [id number]  : Sonny Collins MD, Phone:  [phone]

## 2021-11-10 LAB
ALBUMIN SERPL-MCNC: 4.4 G/DL (ref 3.8–4.8)
ALBUMIN/GLOB SERPL: 1.7 {RATIO} (ref 1.2–2.2)
ALP SERPL-CCNC: 100 IU/L (ref 44–121)
ALT SERPL-CCNC: 11 IU/L (ref 0–44)
AST SERPL-CCNC: 13 IU/L (ref 0–40)
BILIRUB SERPL-MCNC: 0.7 MG/DL (ref 0–1.2)
BUN SERPL-MCNC: 32 MG/DL (ref 8–27)
BUN/CREATININE RATIO: 22 (ref 10–24)
CALCIUM SERPL-MCNC: 8.8 MG/DL (ref 8.6–10.2)
CHLORIDE SERPLBLD-SCNC: 104 MMOL/L (ref 96–106)
CHOLEST SERPL-MCNC: 204 MG/DL (ref 100–199)
CREAT SERPL-MCNC: 1.47 MG/DL (ref 0.76–1.27)
EGFR IF AFRICN AM: 56 ML/MIN/1.73
EGFR IF NONAFRICN AM: 49 ML/MIN/1.73
GLOBULIN, TOTAL: 2.6 G/DL (ref 1.5–4.5)
GLUCOSE SERPL-MCNC: 89 MG/DL (ref 65–99)
HDLC SERPL-MCNC: 37 MG/DL
LDL/HDL RATIO: 3.8 RATIO (ref 0–3.6)
LDLC SERPL CALC-MCNC: 139 MG/DL (ref 0–99)
POTASSIUM SERPL-SCNC: 3.6 MMOL/L (ref 3.5–5.2)
PROT SERPL-MCNC: 7 G/DL (ref 6–8.5)
PSA NG/ML: 1.2 NG/ML (ref 0–4)
SODIUM SERPL-SCNC: 143 MMOL/L (ref 134–144)
TOTAL CO2: 25 MMOL/L (ref 20–29)
TRIGL SERPL-MCNC: 153 MG/DL (ref 0–149)
VLDLC SERPL CALC-MCNC: 28 MG/DL (ref 5–40)

## 2021-11-11 NOTE — PROGRESS NOTES
11/10/21 faxed preop and labs to [facility] @ [phone]    Carson Quiroz,   [facility]  [phone]

## 2021-11-11 NOTE — PROGRESS NOTES
11/9/21 Faxed preop to [facility] @ [phone]    Carson Quiroz,   [facility]  [phone]

## 2021-12-30 ENCOUNTER — TELEPHONE (OUTPATIENT)
Dept: OTHER | Facility: CLINIC | Age: 66
End: 2021-12-30
Payer: COMMERCIAL

## 2021-12-30 DIAGNOSIS — I83.812 VARICOSE VEINS OF LEG WITH PAIN, LEFT: Primary | ICD-10-CM

## 2021-12-31 NOTE — TELEPHONE ENCOUNTER
Pt is scheduled on 2/3/22 w/ Dr. Greenwood.    Isa Guerra, Surgery Scheduler  [facility]

## 2022-01-10 ENCOUNTER — TRANSFERRED RECORDS (OUTPATIENT)
Dept: FAMILY MEDICINE | Facility: CLINIC | Age: 67
End: 2022-01-10

## 2022-01-17 DIAGNOSIS — E87.6 HYPOKALEMIA: ICD-10-CM

## 2022-01-17 RX ORDER — POTASSIUM CHLORIDE 1500 MG/1
TABLET, EXTENDED RELEASE ORAL
Qty: 90 TABLET | Refills: 0 | Status: SHIPPED | OUTPATIENT
Start: 2022-01-17 | End: 2022-04-14

## 2022-01-25 ENCOUNTER — TELEPHONE (OUTPATIENT)
Dept: VASCULAR SURGERY | Facility: CLINIC | Age: 67
End: 2022-01-25
Payer: COMMERCIAL

## 2022-01-25 NOTE — TELEPHONE ENCOUNTER
Vein Clinic Preoperative Nurse Call    Procedure: Left leg VNUS closure GSV(mednec), 1 unit phlebs($)  Date: 2/3/2022  Surgeon: Dr. Greenwood  Time: 0730  Check in time: 0630    Called and left detailed message. Informed patient: when to check in (0630) to sign consent, to bring their preop medications in their original bottle with them (2mg ativan, 0.1mg clonidine, 600 mg clindamycin). Patient will take the medications after signing the consent to the procedure. Instructed patient to wear a mask, wear loose-fitting comfortable clothing, and bring their compression hose. Ensured patient has a /someone that will be responsible for them the rest of the day. Visitors are allowed in the clinic, but they would need to stay in an exam room or wait in the parking lot or leave. If  does leave, IF POSSIBLE, we ask that they do not go more than 15-20 mins from our clinic. Once procedure is completed, we will keep patient in recovery for 30-45 mins, and call  with aftercare instructions. Informed patient, that if possible, they should sit in the backseat to elevate their leg on the ride home.    Pt needs thigh-high compression hose for procedure. Status of the hose: patient has compression hose.    Special instructions: Per Dr. Greenwood, patient needs to hold Eliquis for 24 hours prior to procedure. (last dose 2/1)     Special COVID-19 instructions: Patient aware they need to wear a mask to our clinic and during their procedure. Patient aware that their  can come in with them, but would need to stay in an exam room during the procedure or wait in the parking lot or leave. Nurse will call pt's  when procedure is over.    Patient understands that if they have any of the following symptoms (fever, cough, shortness of breath, rash), they need to notify us immediately to cancel their procedure and will have to reschedule for a later date.    Patient was given preoperative information and told to call the  clinic with any further questions.    Lou Fleming RN  1/25/2022

## 2022-02-02 NOTE — PATIENT INSTRUCTIONS
Post-Procedure Instructions:             VNUS Closure and Phlebectomies      Post-Op Day Zero - The Day of Your Procedure:0  1. Medication for Pain Control and Inflammation Control   - The numbing medication injected during your procedure will last for several hours. The pre-procedure                 tablets may make you very sleepy and you might not remember everything from the procedure or from                 the day. This will usually wear off by the next day.   - Ibuprofen:  If tolerated, take ibuprofen (e.g., Advil) to reduce inflammation whether or not you have                 pain. For three days, take two tablets (200mg each) with every meal and at bedtime with a snack. If                 your pain is not controlled with ibuprofen, you may take prescription pain medication (such as Norco),                 if prescribed.   - You may resume taking any medications you were taking before your procedure.  2. Activity   - Rest with your leg(s) elevated above your heart. This will prevent from a lot of swelling and                 bleeding. You do not need to elevate your leg(s) while sleeping at night. You may go upstairs, sit up to                 eat, use the bathroom, and take several five minute walks. Otherwise, keep your leg(s) elevated.                 Minimize the amount of time you are up on your feet to about 30 minutes at a time.  3. Bandages   - The incision sites will be covered with soft bandages and an ACE wrap. Keep your bandages on and    dry for 48 hours. The ACE should provide  snug  compression, but should not cause pain or numbness    in the toes. If you have significant discomfort or your toes become cold or numb, unwrap your ACE and    rewrap with less tension starting at the toes wrapping upward.  4. Incisions   - Bleeding: You may see some incision sites that are oozing through the bandages. This is not unusual    and can be managed with Rest, Ice, Compression and Elevation (RICE). Apply  ice and firm pressure    directly to the site that is bleeding and rest with your leg(s) elevated above your heart for 20-30 minutes.    Post-Op Day One:  1. Medication   - Ibuprofen:  Continue the same as the Day of Your Procedure. If your pain is not controlled with    ibuprofen, you may take prescription pain medication (such as Norco), if prescribed.   2. Activity   - We would like you to get up at least six times and walk around for short periods of time, unless it is    causing you pain. You should not be on your feet more than 90 minutes at a time. Elevate your leg    above your heart when you are not walking.  3. Bandages   - Your bandages must be kept on and dry for 48 hours.  4. Driving   - You may resume driving when you can do so safely. Do not drive if you are taking narcotic pain    medication.  Post-Op Day Two:   1. Medication  - Ibuprofen: Continue the same as the Day of Your Procedure.  2.  Activity   - Walk as tolerated. Elevate as much as possible when not walking.  3. Bandages and Compression  - Remove ACE wrap and padding. Shower and put on your compression hose during waking hours only for at least 5 days. (Your doctor may instruct you to keep your bandages on until your return appointment; please follow your doctor's instructions.)  4. Incisions   - Your leg(s) will be bruised; there may be swelling, hard knots under the skin and possibly some    numbness. These will likely resolve over time. If you see  hair-like  strings coming out of your    incisions, do not pull them (this will only cause pain/discomfort). We will trim them when you come   back for your follow-up appointment.  5. Call Us If:   - You see any areas on your leg that are red and angry in appearance.   - You notice any drainage that is milky or cloudy in appearance or that has a foul odor.   - You run a temperature of 100.5 or greater.    Post-Op Day Three:  Your follow up appointment is:  ______________________________________________    At this appointment, you will have an ultrasound and we will check your incisions. If your doctor is not scheduled to be in the clinic at the time of your appointment, you will be seen by a different doctor or nurse.  __________________________________________________________________________________________  The Two Weeks Following Your Procedure  1.  Skin Care   - Do not use any lotions, creams or powders on your leg for 14 days or until the incisions have healed.   - Do not soak in a bathtub, whirlpool, or hot tub or go swimming for 14 days or until your incisions have  healed.  2.  Medications   - You may use ibuprofen or acetaminophen (e.g., Tylenol) as needed for pain or discomfort.  3.  Activity   - Do not lift over 25 pounds. After about two weeks you may resume exercise such as aerobics, running,    tennis or weight lifting. Use your common sense and ease back into your exercise routine slowly.   - You may feel a cord-like tightness along the inside of your leg. Gentle stretching can be helpful.  4. Compression Hose   - Your doctor may instruct you to wear compression for longer than seven days; please    follow your doctor's instructions. As a comfort measure, you may choose to wear compression for    longer than required.  5.  Travel   - Do not fly in an airplane for 14 days after your procedure. If you have a long car trip planned within    two to three weeks following your procedure, stop and walk for a few minutes every two hours.    Periodic ankle pumps during the ride may be helpful.    Six Week Appointment   At your six week appointment, you will see your surgeon for an exam and evaluation. This office visit    will be scheduled when you return for Post-op Day Three Return Appointment.     Return to Work  1.  If you work outside the home, you may return to work in a few days depending on the extent of your procedure, how you tolerate it, and the type  of work you perform.  2.  Paperwork: If your employer requires paperwork or you would like a letter written to your employer, please let us know. We will complete disability type forms at no charge. Please allow five business days for forms to be completed.

## 2022-02-03 ENCOUNTER — OFFICE VISIT (OUTPATIENT)
Dept: VASCULAR SURGERY | Facility: CLINIC | Age: 67
End: 2022-02-03
Payer: COMMERCIAL

## 2022-02-03 VITALS — SYSTOLIC BLOOD PRESSURE: 121 MMHG | OXYGEN SATURATION: 97 % | HEART RATE: 55 BPM | DIASTOLIC BLOOD PRESSURE: 64 MMHG

## 2022-02-03 DIAGNOSIS — I83.812 VARICOSE VEINS OF LEG WITH PAIN, LEFT: Primary | ICD-10-CM

## 2022-02-03 PROCEDURE — 37765 STAB PHLEB VEINS XTR 10-20: CPT | Mod: LT | Performed by: SURGERY

## 2022-02-03 PROCEDURE — S9999 SALES TAX: HCPCS | Performed by: SURGERY

## 2022-02-03 PROCEDURE — 37799 UNLISTED PX VASCULAR SURGERY: CPT | Performed by: SURGERY

## 2022-02-03 PROCEDURE — 36475 ENDOVENOUS RF 1ST VEIN: CPT | Mod: LT | Performed by: SURGERY

## 2022-02-03 RX ORDER — HYDROCODONE BITARTRATE AND ACETAMINOPHEN 5; 325 MG/1; MG/1
1 TABLET ORAL EVERY 6 HOURS PRN
Refills: 0 | Status: CANCELLED | OUTPATIENT
Start: 2022-02-03

## 2022-02-03 RX ORDER — RIVAROXABAN 20 MG/1
TABLET, FILM COATED ORAL
COMMUNITY
Start: 2022-01-26 | End: 2022-06-21

## 2022-02-03 NOTE — PROGRESS NOTES
[facility] Procedure Note    Abdiaziz Kimball  February 3, 2022    Abdiaziz Kimball is a 66 year old year old male. He presents for Vein Procedure  .    /64   Pulse 55   SpO2 97%     Flowsheet Data 2/3/2022   Procedure Start Time:  7:40 AM   Prep: Chloraprep   Side: Left   Tx Length (cm): LEFT GSV: 68.5   Junction (cm): LEFT GSV: 2.46   RF Cycles: LEFT GSV: 17   RF TX Time (Minutes): LEFT GSV: 5:40   # PHLEB Sites: LEFT LE   Sedation taken: Yes   Pre Pt. Physical / Cognitive Limitations: WNL   TOTAL Local anesthesia Injected (ml): 2   Max Volume Local Anesthesia (ml): 11   TOTAL Tumescent Injected volume (ml): 500   Max Volume Tumescent (ml): 572   Post Pt. Physical / Cognitive Limitations: WNL   Procedure End Time:  8:55 AM   D/C Instructions given, states readiness to leave and escorted to car: Yes       Venus Closure    Date/Time: 2/3/2022 9:20 AM  Performed by: Adama Greenwood MD  Authorized by: Adama Greenwood MD     Time out: Immediately prior to the procedure a time out was called    Preparation: Patient was prepped and draped in usual sterile fashion    1st Assist: Lory Quintero CST/PREET    Circulator: Cony Hui RN    Procedure:  VNUS  Procedure side:  Left  One Vein    Vein Treated:  GSV  Patient tolerance:  Patient tolerated the procedure well with no immediate complications  Phlebectomy    Date/Time: 2/3/2022 9:21 AM  Performed by: Adama Greenwood MD  Authorized by: Adama Greenwood MD     Time out: Immediately prior to the procedure a time out was called    Preparation: Patient was prepped and draped in usual sterile fashion    1st Assist: Lory Quintero CST/PREET    Circulator: Cony Hui RN    Procedure:  Phlebectomies  Type:  Cosmetic  Procedure side:  Left  Session:  Full  Patient tolerance:  Patient tolerated the procedure well with no immediate complications  Wrap/Hose:  Wraps    OPERATIVE DESCRIPTION:  Details of the procedure including risks of  bleeding, infection, nerve injury, deep vein thrombosis and a 1% chance of recanalization of the close vein was discussed.  The patient verbalized understanding and proceeded to the treatment room under informed consent.  Blood pressure, pulse, pulse oximetry were monitored continuously by Cony Teixeira RN.    The patient was placed supine on the operating table and his left groin and entire left lower extremity were prepped and draped sterilely.  Timeout was called and we verified the patient's identity, the operative site, and the proposed procedure.  We confirmed that he had stopped his Xarelto 48 hours prior to this procedure.  We also confirmed that he took prophylactic clindamycin given prior orthopedic hardware in his feet.  The ultrasound probe was used to identify the left greater saphenous vein which was found to be continuous from the ankle to the saphenofemoral junction.  The skin overlying the left greater saphenous vein at the level of mid calf was infiltrated with 1% lidocaine and a micropuncture needle was placed into the left GSV.  A guidewire and 7 Maltese sheath were placed.    The ClosureFast catheter was flushed and then was inserted into the sheath and up the greater saphenous vein towards the saphenofemoral junction.  The tip of the catheter was positioned 2.46 cm from the saphenofemoral junction under ultrasound guidance.    The left greater saphenous vein was then anesthetized with tumescent anesthetic composed of lidocaine with epinephrine and bicarbonate combined with saline solution.  This was performed under ultrasound guidance.  The marked varicosities were anesthetized with the same tumescent.  I applied compression over the tip of the catheter using the ultrasound probe and the additional width of 2 fingerbreadths.  I treated the initial 3 separate 7 cm segments with two 20-second sessions apiece.  I continued withdrawal of the catheter at 7 cm increments treating the remainder for  either single or double 20 second sessions.  In each case I ensured adequate impedance.  After I completed the pullback I reimage the saphenous vein and found to be thick walled and minimally compressible while the common femoral vein remained freely compressible.    The sheath and catheter were then removed and hemostasis was secured with pressure.    The marked varicosities were then removed by making 43 stab wounds with an ophthalmic blade, retrieving the veins with pamela hooks and a avulsing them with mosquito clamps.  Hemostasis was secured with pressure.    After the marked varicosities have been removed the leg was cleansed with saline solution and dried.  Petroleum jelly was applied over each of the stab wounds.  The leg was then dressed with ABD pads, cast padding, and an Ace bandage from the toes to the groin.  The patient was kept on the table for 30 minutes to ensure excellent hemostasis and then was taken to his vehicle in a wheelchair.    Adama Greenwood MD

## 2022-02-03 NOTE — LETTER
2/3/2022         RE: Abdiaziz Kimball  [address] [address]        Dear Colleague,    Thank you for referring your patient, Abdiaziz Kimball, to the [facility] [place]. Please see a copy of my visit note below.    Pre-procedure Nursing Note    Abdiaziz Kimball presents to clinic for Vein Procedure  .   /Person Responsible for Patient: Alexandro (son)  Phone Number: [phone]    Prophylactic Medication:Antibiotics, Clindamycin 600mg,   Time Taken: 06   Sedation Medication: Ativan, 2mg ,   Time Taken: 0632 and Clonidine, 0.1mg,   Time Taken: 0632  Compression Stockings: Patient brought  The procedure is being performed on LLE.  Patient understanding of procedure matches consent? YES    Patient's pre-procedure medications verified by LLOYD Donnelly.    Cony Hui RN on 2/3/2022 at 6:40 AM          [facility] Procedure Note    Abdiaziz Kimball  February 3, 2022    Abdiaziz Kimball is a 66 year old year old male. He presents for Vein Procedure  .    /64   Pulse 55   SpO2 97%     Flowsheet Data 2/3/2022   Procedure Start Time:  7:40 AM   Prep: Chloraprep   Side: Left   Tx Length (cm): LEFT GSV: 68.5   Junction (cm): LEFT GSV: 2.46   RF Cycles: LEFT GSV: 17   RF TX Time (Minutes): LEFT GSV: 5:40   # PHLEB Sites: LEFT LE   Sedation taken: Yes   Pre Pt. Physical / Cognitive Limitations: WNL   TOTAL Local anesthesia Injected (ml): 2   Max Volume Local Anesthesia (ml): 11   TOTAL Tumescent Injected volume (ml): 500   Max Volume Tumescent (ml): 572   Post Pt. Physical / Cognitive Limitations: WNL   Procedure End Time:  8:55 AM   D/C Instructions given, states readiness to leave and escorted to car: Yes       Venus Closure    Date/Time: 2/3/2022 9:20 AM  Performed by: Adama Greenwood MD  Authorized by: Adama Greenwood MD     Time out: Immediately prior to the procedure a time out was called    Preparation: Patient was prepped and draped in usual  sterile fashion    1st Assist: DELLA Gonzalez    Circulator: Cony Hui RN    Procedure:  VNUS  Procedure side:  Left  One Vein    Vein Treated:  GSV  Patient tolerance:  Patient tolerated the procedure well with no immediate complications  Phlebectomy    Date/Time: 2/3/2022 9:21 AM  Performed by: Adama Greenwood MD  Authorized by: Adama Greenwood MD     Time out: Immediately prior to the procedure a time out was called    Preparation: Patient was prepped and draped in usual sterile fashion    1st Assist: DELLA Gonzalez    Circulator: Cony Hui RN    Procedure:  Phlebectomies  Type:  Cosmetic  Procedure side:  Left  Session:  Full  Patient tolerance:  Patient tolerated the procedure well with no immediate complications  Wrap/Hose:  Wraps    OPERATIVE DESCRIPTION:  Details of the procedure including risks of bleeding, infection, nerve injury, deep vein thrombosis and a 1% chance of recanalization of the close vein was discussed.  The patient verbalized understanding and proceeded to the treatment room under informed consent.  Blood pressure, pulse, pulse oximetry were monitored continuously by Cony Teixeira RN.    The patient was placed supine on the operating table and his left groin and entire left lower extremity were prepped and draped sterilely.  Timeout was called and we verified the patient's identity, the operative site, and the proposed procedure.  We confirmed that he had stopped his Xarelto 48 hours prior to this procedure.  We also confirmed that he took prophylactic clindamycin given prior orthopedic hardware in his feet.  The ultrasound probe was used to identify the left greater saphenous vein which was found to be continuous from the ankle to the saphenofemoral junction.  The skin overlying the left greater saphenous vein at the level of mid calf was infiltrated with 1% lidocaine and a micropuncture needle was placed into the left GSV.  A guidewire and 7  Lithuanian sheath were placed.    The ClosureFast catheter was flushed and then was inserted into the sheath and up the greater saphenous vein towards the saphenofemoral junction.  The tip of the catheter was positioned 2.46 cm from the saphenofemoral junction under ultrasound guidance.    The left greater saphenous vein was then anesthetized with tumescent anesthetic composed of lidocaine with epinephrine and bicarbonate combined with saline solution.  This was performed under ultrasound guidance.  The marked varicosities were anesthetized with the same tumescent.  I applied compression over the tip of the catheter using the ultrasound probe and the additional width of 2 fingerbreadths.  I treated the initial 3 separate 7 cm segments with two 20-second sessions apiece.  I continued withdrawal of the catheter at 7 cm increments treating the remainder for either single or double 20 second sessions.  In each case I ensured adequate impedance.  After I completed the pullback I reimage the saphenous vein and found to be thick walled and minimally compressible while the common femoral vein remained freely compressible.    The sheath and catheter were then removed and hemostasis was secured with pressure.    The marked varicosities were then removed by making 43 stab wounds with an ophthalmic blade, retrieving the veins with pamela hooks and a avulsing them with mosquito clamps.  Hemostasis was secured with pressure.    After the marked varicosities have been removed the leg was cleansed with saline solution and dried.  Petroleum jelly was applied over each of the stab wounds.  The leg was then dressed with ABD pads, cast padding, and an Ace bandage from the toes to the groin.  The patient was kept on the table for 30 minutes to ensure excellent hemostasis and then was taken to his vehicle in a wheelchair.    Adama Greenwood MD      Again, thank you for allowing me to participate in the care of your patient.         Sincerely,        Adama Greenwood MD

## 2022-02-07 ENCOUNTER — ANCILLARY PROCEDURE (OUTPATIENT)
Dept: ULTRASOUND IMAGING | Facility: CLINIC | Age: 67
End: 2022-02-07
Attending: SURGERY
Payer: COMMERCIAL

## 2022-02-07 ENCOUNTER — OFFICE VISIT (OUTPATIENT)
Dept: VASCULAR SURGERY | Facility: CLINIC | Age: 67
End: 2022-02-07
Attending: SURGERY
Payer: COMMERCIAL

## 2022-02-07 DIAGNOSIS — Z09 POSTOP CHECK: Primary | ICD-10-CM

## 2022-02-07 DIAGNOSIS — I83.812 VARICOSE VEINS OF LEG WITH PAIN, LEFT: ICD-10-CM

## 2022-02-07 PROCEDURE — 99207 PR NO CHARGE NURSE ONLY: CPT

## 2022-02-07 PROCEDURE — 93971 EXTREMITY STUDY: CPT | Mod: LT | Performed by: SURGERY

## 2022-02-07 NOTE — PROGRESS NOTES
Vein Clinic Postoperative Nurse Note    Patient is here for their 72 hour postoperative visit.    Procedure: Left leg VNUS closure GSV(mednec), 1 unit phlebs($)  Procedure Date: 2/3/22  Surgeon: Dr. Greenwood    Ultrasound Result: The left GSV is closed 11.1mm from the SFJ to the mid calf with evidence of thrombus throughout. No evidence of LLE DVT.    Physical Exam: Incisions are approximated without signs of infection.  Ecchymosis: moderate  Swelling: moderate especially to left ankle and foot  Paresthesia: pt stated he does have numbness to his left [place] lower leg from ankle to mid shin / mid calf     Patient Questions or Concerns: Pt is doing well and has no concerns.    Assisted pt with donning his thigh thigh compression hose.    Reviewed postoperative instructions with patient and provided them with written material of common things to expect from their procedure.     Patient's Next Vein Clinic Appointment: 6 week post op with Dr. Greenwood (3/29/22).    Cony Hui RN

## 2022-02-07 NOTE — LETTER
2/7/2022         RE: Abdiaziz Kimball  [address] [facility] [address]        Dear Colleague,    Thank you for referring your patient, Abdiaziz Kimball, to the [facility]IN [facility] [place]. Please see a copy of my visit note below.        Vein Clinic Postoperative Nurse Note    Patient is here for their 72 hour postoperative visit.    Procedure: Left leg VNUS closure GSV(mednec), 1 unit phlebs($)  Procedure Date: 2/3/22  Surgeon: Dr. Greenwood    Ultrasound Result: The left GSV is closed 11.1mm from the SFJ to the mid calf with evidence of thrombus throughout. No evidence of LLE DVT.    Physical Exam: Incisions are approximated without signs of infection.  Ecchymosis: moderate  Swelling: moderate especially to left ankle and foot  Paresthesia: pt stated he does have numbness to his left [place] lower leg from ankle to mid shin / mid calf     Patient Questions or Concerns: Pt is doing well and has no concerns.    Assisted pt with donning his thigh thigh compression hose.    Reviewed postoperative instructions with patient and provided them with written material of common things to expect from their procedure.     Patient's Next Vein Clinic Appointment: 6 week post op with Dr. Greenwood (3/29/22).    Cony Hui, RN      Again, thank you for allowing me to participate in the care of your patient.        Sincerely,         Vein Nurse

## 2022-02-07 NOTE — PATIENT INSTRUCTIONS
Vein Closure (Ablation)  Common Things to Expect    - Small lumps may develop beneath phlebectomy sites and the site where the vein closure device was inserted.  This is a normal step in healing.  These should not be painful, but may be tender to the touch. It can take 6 weeks to 3 months for these lumps/firmness to resolve.   - Bruising will look worse before it looks better and can last for 4-6 weeks.  - After about 10 days, you may notice tightness/pulling on the inside thigh and knee. As your ablated vein is healing, it contracts, causing a tightness or pulling sensation. This may last for several weeks, but will resolve. Treat it with Ibuprofen or Advil.  - Numbness will get better with time, but may take 3 months to a year to resolve.  - You may notice that the skin on your legs has become ultra-sensitive to touch. For example, the weight of your sheets may feel painful. This usually resolves in 6 weeks.  - Ankle swelling is not uncommon and may last 4-6 weeks.   - To get optimal results from your procedure, wearing your compression hose is key for the first 7 days. This is necessary to ensure proper closure of the ablated vein.  - For 2 weeks, no weight lifting over 25lbs, no running, and no vigorous aerobic exercise. After this time, ease back into your normal activities. If you do too much too soon, you will have more pain and bruising and possibly re-open the vein that was closed. It takes about 2 weeks for the ablated vein to permanently close. Keep in mind your body is still healing.  - For 2 weeks, do not shave your legs or use lotions, powders, creams to allow proper healing of phlebectomy sites and vein access sites.    Vein Removal (Phlebectomy)  Common Things to Expect     - Small lumps may develop beneath phlebectomy sites and the site where the vein closure device was inserted. This is a normal step in healing.  These should not be painful, but may be tender to the touch. It can take 6 weeks to 3  months for these lumps/firmness to resolve.  - Bruising will look worse before it looks better and can last for 4-6 weeks.  - Ankle swelling is not uncommon and may last 4-6 weeks.       If you are experiencing any of the following symptoms, please seek immediate medical attention at your local emergency department.  - Significant pain in the back of the calf possibly with difficulty walking  - Significant swelling and/or tenderness in the back of the calf  - Redness that continues to spread  - Chest pain and/or shortness of breath

## 2022-03-17 ENCOUNTER — TRANSFERRED RECORDS (OUTPATIENT)
Dept: FAMILY MEDICINE | Facility: CLINIC | Age: 67
End: 2022-03-17

## 2022-03-17 ENCOUNTER — TELEPHONE (OUTPATIENT)
Dept: CARDIOLOGY | Facility: CLINIC | Age: 67
End: 2022-03-17
Payer: COMMERCIAL

## 2022-03-17 NOTE — TELEPHONE ENCOUNTER
3/17/22 Incoming records recd from [facility] 9 /23/21. Sent to HIM for scanning  KHerroRN 319 pm

## 2022-03-21 ENCOUNTER — OFFICE VISIT (OUTPATIENT)
Dept: FAMILY MEDICINE | Facility: CLINIC | Age: 67
End: 2022-03-21

## 2022-03-21 VITALS
HEIGHT: 70 IN | SYSTOLIC BLOOD PRESSURE: 138 MMHG | BODY MASS INDEX: 29.01 KG/M2 | WEIGHT: 202.6 LBS | TEMPERATURE: 97.8 F | HEART RATE: 67 BPM | OXYGEN SATURATION: 98 % | DIASTOLIC BLOOD PRESSURE: 78 MMHG

## 2022-03-21 DIAGNOSIS — I10 ESSENTIAL HYPERTENSION: ICD-10-CM

## 2022-03-21 DIAGNOSIS — I48.0 PAROXYSMAL ATRIAL FIBRILLATION (H): ICD-10-CM

## 2022-03-21 DIAGNOSIS — R20.2 NUMBNESS AND TINGLING OF RIGHT ARM: ICD-10-CM

## 2022-03-21 DIAGNOSIS — M79.662 PAIN OF LEFT LOWER LEG: Primary | ICD-10-CM

## 2022-03-21 DIAGNOSIS — R20.0 NUMBNESS AND TINGLING OF RIGHT ARM: ICD-10-CM

## 2022-03-21 PROCEDURE — 99213 OFFICE O/P EST LOW 20 MIN: CPT | Performed by: NURSE PRACTITIONER

## 2022-03-21 RX ORDER — TRIAMTERENE/HYDROCHLOROTHIAZID 37.5-25 MG
1 TABLET ORAL EVERY 24 HOURS
COMMUNITY
End: 2022-03-29

## 2022-03-21 RX ORDER — TRIAMTERENE AND HYDROCHLOROTHIAZIDE 75; 50 MG/1; MG/1
1 TABLET ORAL DAILY
COMMUNITY
Start: 2022-03-18 | End: 2022-03-29

## 2022-03-21 RX ORDER — ACETAMINOPHEN 500 MG
500 TABLET ORAL
COMMUNITY
Start: 2021-11-30

## 2022-03-21 RX ORDER — DILTIAZEM HYDROCHLORIDE 240 MG/1
1 CAPSULE, EXTENDED RELEASE ORAL EVERY 24 HOURS
COMMUNITY
End: 2022-03-29

## 2022-03-21 RX ORDER — POTASSIUM CHLORIDE 1500 MG/1
1 TABLET, EXTENDED RELEASE ORAL EVERY 24 HOURS
COMMUNITY
End: 2022-03-29

## 2022-03-21 NOTE — PROGRESS NOTES
"Problem(s) Oriented visit        SUBJECTIVE:                                                    Abdiaziz Kimball is a 66 year old male who presents to clinic today for the following health issues :    Pain in left leg mid calf to just above knee. Worse when walking. Every 8-12 steps feels like it might give way. Does not have ACL on left knee. No redness or warmth. Symptoms started about 1 week ago, not getting any better. Took Tylenol last night, which helped. Had varicose vein surgery done in this leg last month. Anticoagulated with Xarelto for paroxysmal a-fib.    Right arm numbness/tingling for past year. Okay if sleeps on left side with arm bent. Goes numb if arm straight down or straight out. History of neck problems after accident. Also has been diagnosed with bilateral carpal tunnel right > left. EMG done in 2019.      Problem list, Medication list, Allergies, and Medical/Social/Surgical histories reviewed in EPIC and updated as appropriate.   Additional history: as documented    ROS:  5 point ROS completed and negative except noted above, including Gen, CV, Resp, MS, Neuro    OBJECTIVE:                                                    /78   Pulse 67   Temp 97.8  F (36.6  C)   Ht 1.778 m (5' 10\")   Wt 91.9 kg (202 lb 9.6 oz)   SpO2 98%   BMI 29.07 kg/m    Body mass index is 29.07 kg/m .   GENERAL: healthy, alert and no distress  RESP: lungs clear to auscultation - no rales, rhonchi or wheezes  CV: regular rates and rhythm, normal S1 S2, no S3 or S4, no murmur, click or rub, peripheral pulses strong and 1+ bilateral lower extremity pitting edema to lower legs, ankles, feet    MS: extremities normal- no gross deformities noted  SKIN: no suspicious lesions or rashes  NEURO: Normal strength and tone, sensory exam grossly normal and mentation intact  PSYCH: normal affect & mood     ASSESSMENT/PLAN:                                                      Abdiaziz was seen today for musculoskeletal " problem and musculoskeletal problem.    Diagnoses and all orders for this visit:    Pain of left lower leg  Not tender with palpation. No notable redness or swelling. Unlikely DVT due to anticoagulation with Xarelto. Recommend patient discuss with vascular at his follow-up in the next week, sooner if worsening.    Paroxysmal atrial fibrillation (H)  Rhythm control with Diltiazem, anticoagulation with Xarelto.     Essential hypertension  Controlled with current treatment. No changes today    Numbness and tingling of right arm  Suspect nerve impingement at cervical spine or shoulder level due to whole arm involvement. Patient does not want to see ortho or have repeat EMG at this time. Will follow-up with new/worsening symptoms.      See Patient Instructions  Patient Instructions   Monitor pain/symptoms of left leg. Discuss with vascular doctor at upcoming appointment. Notify clinic if worsening -> would order venous ultrasound.    For right arm numbness, likely nerve being compressed at neck or shoulder causing entire arm sensation changes. Could consider cervical spine MRI to evaluate further.       KEVIN Bliss CNP  [facility]  Family Practice  [facility]  [phone]    For any issues my office # is [phone]

## 2022-03-21 NOTE — PATIENT INSTRUCTIONS
Monitor pain/symptoms of left leg. Discuss with vascular doctor at upcoming appointment. Notify clinic if worsening -> would order venous ultrasound.    For right arm numbness, likely nerve being compressed at neck or shoulder causing entire arm sensation changes. Could consider cervical spine MRI to evaluate further.

## 2022-03-29 ENCOUNTER — OFFICE VISIT (OUTPATIENT)
Dept: VASCULAR SURGERY | Facility: CLINIC | Age: 67
End: 2022-03-29
Payer: COMMERCIAL

## 2022-03-29 DIAGNOSIS — I83.812 VARICOSE VEINS OF LEG WITH PAIN, LEFT: ICD-10-CM

## 2022-03-29 DIAGNOSIS — Z09 POSTOP CHECK: Primary | ICD-10-CM

## 2022-03-29 PROCEDURE — 99207 PR VEINSOLUTIONS POST OPERATIVE VISIT: CPT | Performed by: SURGERY

## 2022-03-29 NOTE — LETTER
3/29/2022         RE: Abdiaziz Kimball  [address] [id number]        Dear Colleague,    Thank you for referring your patient, Abdiaziz Kimball, to the [facility] [place]. Please see a copy of my visit note below.    Abdiaziz Kimball returns 6 weeks status post radiofrequency ablation of his left greater saphenous vein with 1 unit of cosmetic stab avulsion phlebectomies.  He notes significant improvement in the way that his left leg feels compared to presurgery.  There is a hard lump on the medial aspect of the proximal left calf at the site of significant stab phlebectomies.  This hard lump is slowly decreasing in size and does not bother him.  At his 72-hour post procedure check he noted some moderate paresthesias on the medial aspect of the mid and distal left calf.  This is also improving and is not bothering him.  At today's visit he was in good spirits and had no complaints.    Exam:  His left leg looks very good with no missed varicosities.  There is a quarter sized lump on the medial proximal left calf that corresponds to the site of significant stab phlebectomies.  I suspect this is a thrombosed remnant vein segment.  Overlying skin is normal.  No other issues.    Previous 72-hour post procedure ultrasound demonstrated an appropriately ablated left GSV with no technical issues.    ASSESSMENT:  6 weeks status post radiofrequency ablation of left greater saphenous vein with 1 unit of stab phlebectomies clinically doing well.    RECOMMENDATION:  I reviewed all the above with Chicho.  I explained that the indurated area will slowly resolve.  He may utilize his compression stockings and heat to expedite that process.  I explained the natural history of paresthesias and the fact that he should also improve but it can take up to 1 year.  I have no venous concerns.  He continues to wear his knee-high compression stockings.  Follow-up will be with me in 6 months for a post  procedure ultrasound as part of our surveillance protocol.    Macho Greenwood MD        Again, thank you for allowing me to participate in the care of your patient.        Sincerely,        Adama Greenwood MD

## 2022-03-29 NOTE — PROGRESS NOTES
Abdiaziz Kimball returns 6 weeks status post radiofrequency ablation of his left greater saphenous vein with 1 unit of cosmetic stab avulsion phlebectomies.  He notes significant improvement in the way that his left leg feels compared to presurgery.  There is a hard lump on the medial aspect of the proximal left calf at the site of significant stab phlebectomies.  This hard lump is slowly decreasing in size and does not bother him.  At his 72-hour post procedure check he noted some moderate paresthesias on the medial aspect of the mid and distal left calf.  This is also improving and is not bothering him.  At today's visit he was in good spirits and had no complaints.    Exam:  His left leg looks very good with no missed varicosities.  There is a quarter sized lump on the medial proximal left calf that corresponds to the site of significant stab phlebectomies.  I suspect this is a thrombosed remnant vein segment.  Overlying skin is normal.  No other issues.    Previous 72-hour post procedure ultrasound demonstrated an appropriately ablated left GSV with no technical issues.    ASSESSMENT:  6 weeks status post radiofrequency ablation of left greater saphenous vein with 1 unit of stab phlebectomies clinically doing well.    RECOMMENDATION:  I reviewed all the above with Chicho.  I explained that the indurated area will slowly resolve.  He may utilize his compression stockings and heat to expedite that process.  I explained the natural history of paresthesias and the fact that he should also improve but it can take up to 1 year.  I have no venous concerns.  He continues to wear his knee-high compression stockings.  Follow-up will be with me in 6 months for a post procedure ultrasound as part of our surveillance protocol.    Macho Greenwood MD

## 2022-04-14 DIAGNOSIS — E87.6 HYPOKALEMIA: ICD-10-CM

## 2022-04-14 RX ORDER — POTASSIUM CHLORIDE 1500 MG/1
TABLET, EXTENDED RELEASE ORAL
Qty: 90 TABLET | Refills: 2 | Status: SHIPPED | OUTPATIENT
Start: 2022-04-14 | End: 2022-12-05

## 2022-04-14 NOTE — TELEPHONE ENCOUNTER
Potassium chloride 20 meq    LOV 3/21/22  Last labs 11/29/21    No appt scheduled    BP Readings from Last 3 Encounters:   03/21/22 138/78   02/03/22 121/64   11/09/21 117/74     Last Comprehensive Metabolic Panel:  Sodium   Date Value Ref Range Status   11/09/2021 143 134 - 144 mmol/L Final     Potassium   Date Value Ref Range Status   11/09/2021 3.6 3.5 - 5.2 mmol/L Final     Chloride   Date Value Ref Range Status   11/09/2021 104 96 - 106 mmol/L Final     Carbon Dioxide   Date Value Ref Range Status   06/24/2021 28 20 - 32 mmol/L Final     Anion Gap   Date Value Ref Range Status   06/24/2021 3 3 - 14 mmol/L Final     Glucose   Date Value Ref Range Status   11/09/2021 89 65 - 99 mg/dL Final     Urea Nitrogen   Date Value Ref Range Status   11/09/2021 32 (H) 8 - 27 mg/dL Final     BUN/Creatinine Ratio   Date Value Ref Range Status   11/09/2021 22 10 - 24 Final     Creatinine   Date Value Ref Range Status   11/09/2021 1.47 (H) 0.76 - 1.27 mg/dL Final     GFR Estimate   Date Value Ref Range Status   06/24/2021 41 (L) >60 mL/min/[1.73_m2] Final     Comment:     Non  GFR Calc  Starting 12/18/2018, serum creatinine based estimated GFR (eGFR) will be   calculated using the Chronic Kidney Disease Epidemiology Collaboration   (CKD-EPI) equation.       Calcium   Date Value Ref Range Status   11/09/2021 8.8 8.6 - 10.2 mg/dL Final

## 2022-05-16 ENCOUNTER — TRANSFERRED RECORDS (OUTPATIENT)
Dept: FAMILY MEDICINE | Facility: CLINIC | Age: 67
End: 2022-05-16

## 2022-06-01 ENCOUNTER — TRANSFERRED RECORDS (OUTPATIENT)
Dept: FAMILY MEDICINE | Facility: CLINIC | Age: 67
End: 2022-06-01

## 2022-06-10 NOTE — PROGRESS NOTES
Pre-procedure Nursing Note    Abdiaziz Kimball presents to clinic for Vein Procedure  .   /Person Responsible for Patient: Alexandro (son)  Phone Number: [phone]    Prophylactic Medication:Antibiotics, Clindamycin 600mg,   Time Taken: 0632   Sedation Medication: Ativan, 2mg ,   Time Taken: 0632 and Clonidine, 0.1mg,   Time Taken: 0632  Compression Stockings: Patient brought  The procedure is being performed on LLE.  Patient understanding of procedure matches consent? YES    Patient's pre-procedure medications verified by LLOYD Donnelly.    Cony Hui RN on 2/3/2022 at 6:40 AM   Surgery Orthopedic Consultation    Date of Service  6/10/2022    Referring Physician  Saturnino Mejia M.D.    Consulting Physician  Anuj Simpson M.D.    Reason for Consultation  Right ankle fracture dislocation    History of Presenting Illness  81 y.o. female who presented 6/9/2022 after a fall, with right ankle pain.  She usually walks with a walker or with hiking sticks when she is outside.  She states that her  tripped and fell onto her last night in the kitchen which knocked her to the ground and she was pinned between her  and the ground.  She has significant right ankle pain.  No prior right ankle pain or surgery before the injury.  X-rays significant for a trimalleolar ankle fracture dislocation, which was reduced in the ER.  Placed in a splint.  She is feeling more comfortable now after reduction, but still has pain.  Admitted to the hospital due to mobility issues and concern for further falls at home.    Review of Systems  14 point review of systems conducted, which is negative aside from what is in HPI    Past Medical History   has a past medical history of Arthritis, Bowel habit changes, Breath shortness, Cataract, COPD (chronic obstructive pulmonary disease) (HCC), Diabetes (HCC), Heart burn, Heart murmur, Hiatus hernia syndrome, High cholesterol, Hypertension, Indigestion, Malignant neoplasm of overlapping sites of breast in female, estrogen receptor positive (HCC) (11/6/2019), Psychiatric problem (2015), Shortness of breath, Snoring, Urinary incontinence, and Wears glasses.    Surgical History   has a past surgical history that includes other orthopedic surgery (1993); gyn surgery (1992); cholecystectomy (1964); knee arthroplasty total (Right, 11/2/2015); cataract extraction with iol (Bilateral); lefort colpoclesis (1/21/2019); bladder sling female (1/21/2019); pr mastectomy, modified radical (Right, 7/17/2019); node biopsy sentinel (Right, 7/17/2019); axillary node dissection  (7/17/2019); mastectomy (Left, 7/17/2019); flap closure (Bilateral, 7/17/2019); wide excision (Right, 8/16/2019); incision and drainage general (Bilateral, 8/16/2019); abdominal hysterectomy total (1994); and pr total hip arthroplasty (Right, 9/30/2021).    Family History  family history is not on file.    Social History   reports that she quit smoking about 30 years ago. Her smoking use included cigarettes. She has a 30.00 pack-year smoking history. She has quit using smokeless tobacco. She reports current alcohol use of about 0.6 oz of alcohol per week. She reports that she does not use drugs.    Medications  Prior to Admission Medications   Prescriptions Last Dose Informant Patient Reported? Taking?   B Complex Vitamins (B COMPLEX PO)  Patient Yes No   Sig: Take 1 Tab by mouth every day.   Calcium Citrate (CITRACAL PO)  Patient Yes No   Sig: Take 1 Tab by mouth every day.   Cholecalciferol (VITAMIN D PO)  Patient Yes No   Sig: Take  by mouth.   DULoxetine (CYMBALTA) 30 MG Cap DR Particles   No No   Sig: Take 1 capsule by mouth every day. TOTAL 90 MG DAILY   DULoxetine (CYMBALTA) 60 MG Cap DR Particles delayed-release capsule 6/9/2022 at Unknown time  No No   Sig: Take 1 Capsule by mouth every day.   Dexlansoprazole (DEXILANT) 60 MG CAPSULE DELAYED RELEASE delayed-release capsule 6/8/2022  No No   Sig: Take 60 mg by mouth every evening. Indications: Gastroesophageal Reflux Disease   Lifitegrast (XIIDRA) 5 % Solution  Patient Yes No   Sig: Place 1 Drop in both eyes every bedtime.   MAGNESIUM PO  Patient Yes No   Sig: Take 1 capsule by mouth as needed.   MYRBETRIQ 50 MG TABLET SR 24 HR 6/8/2022  No No   Sig: TAKE 1 TABLET EVERY EVENINGFOR FREQUENT URINATION,    URINARY INCONTINENCE,      URINARY URGENCY.   Melatonin 5 MG Tab 6/8/2022 at Unknown time Patient Yes No   Sig: Take 0.25 Tabs by mouth every bedtime.   Multiple Vitamins-Minerals (MULTIVITAMIN PO) 6/8/2022 Patient Yes No   Sig: Take 1 Tab by mouth every day.    acetaminophen (TYLENOL) 500 MG Tab  Patient Yes No   Sig: Take 500 mg by mouth every 6 hours as needed.   acetaminophen (TYLENOL) 500 MG Tab   No No   Sig: Take 1-2 Tablets by mouth every 6 hours as needed.   alendronate (FOSAMAX) 35 MG tablet 6/5/2022  Yes No   Sig: alendronate 35 mg tablet   Take 1 tablet every week by oral route.   anastrozole (ARIMIDEX) 1 MG Tab 6/8/2022  Yes No   Sig: Take 1 mg by mouth.   fluconazole (DIFLUCAN) 150 MG tablet   Yes No   Sig: fluconazole 150 mg tablet   fluticasone (FLONASE) 50 MCG/ACT nasal spray   Yes No   Sig: fluticasone propionate 50 mcg/actuation nasal spray,suspension   gabapentin (NEURONTIN) 100 MG Cap 6/8/2022  Yes No   Sig: Take 100 mg by mouth.   ibuprofen (MOTRIN) 200 MG Tab 6/8/2022 Patient Yes No   Sig: Take 200 mg by mouth every 6 hours as needed for Mild Pain.   lovastatin (MEVACOR) 20 MG Tab 6/8/2022 at Unknown time Patient Yes No   Sig: Take 20 mg by mouth every evening.   metFORMIN (GLUCOPHAGE) 500 MG Tab 6/8/2022  No No   Sig: Take 1 tablet by mouth every day.   nystatin (MYCOSTATIN) 822747 UNIT/ML Suspension   Yes No   Sig: nystatin 100,000 unit/mL oral suspension   polyethylene glycol/lytes (MIRALAX) Pack  Patient Yes No   Sig: Take 17 g by mouth every day.      Facility-Administered Medications: None       Allergies  No Known Allergies    Physical Exam  Temp:  [36.6 °C (97.8 °F)-36.6 °C (97.9 °F)] 36.6 °C (97.9 °F)  Pulse:  [73-83] 73  Resp:  [16-18] 16  BP: ()/(45-82) 103/52  SpO2:  [95 %-100 %] 97 %    Physical Exam    On exam of right lower extremity, the splint is in place.  Able to move toes and has sensation over exposed toes.  Fluids      Laboratory                          Imaging  DX-ANKLE 2- VIEWS RIGHT   Final Result         Interval reduction of trimalleolar ankle fracture      DX-ANKLE 2- VIEWS RIGHT   Final Result         Displaced trimalleolar fracture with dorsal dislocation of the talus      CT-ANKLE W/O PLUS RECONS RIGHT     (Results Pending)       Assessment/Plan  Status post fall on 6/9/2022 with right trimalleolar ankle fracture dislocation, posterior pilon variant    Plan:  I had a long discussion with the patient today.  I discussed that her ankle injury is extremely unstable, given that she dislocated the ankle and has a trimalleolar fracture.  This injury does meet surgical indication given the severity and instability of the injury.  I discussed the risks of surgery including but not limited to bleeding, infection, damage to nerves and blood vessels, hardware failure loosening, nonunion/malunion, posttraumatic arthritis, blood clots, need for further surgery, and problems with anesthesia.  I also discussed the postoperative rehab process.  She understands and wants to proceed.  We will plan for ORIF right trimalleolar ankle fracture possibly today, pending OR availability.  Would get a stat CT scan this morning for preoperative planning.  Keep NPO.

## 2022-06-20 NOTE — PROGRESS NOTES
[facility] [address] [place] [address]  Phone: [phone]  Fax: [phone]  Primary Provider: Bertin Wray  Pre-op Performing Provider: BERTIN WRAY      PREOPERATIVE EVALUATION:  Today's date: 6/21/2022    Abdiaziz Kimball is a 66 year old male who presents for a preoperative evaluation.    Surgical Information:  Surgery/Procedure: Carpal tunnel- R hand  Surgery Location: [facility]   Surgeon: Dr Jose Joseph  Surgery Date: 6/27/22  Time of Surgery: TBD  Where patient plans to recover: At home with family  Fax number for surgical facility: [phone]    Type of Anesthesia Anticipated: To be determined    Preoperative Questionnaire:   No - Have you ever had a heart attack or stroke?  No - Have you ever had surgery on your heart or blood vessels, such as a stent, coronary (heart) bypass, or surgery on an artery in the head, neck, heart, or legs?  No - Do you have chest pain when you are physically active?  No - Do you have a history of heart failure?  No - Do you currently have a cold, bronchitis, or symptoms of other respiratory (head and chest) infections?  No - Do you have a cough, shortness of breath, or wheezing?  No - Do you or anyone in your family have a history of blood clots?  No - Do you or anyone in your family have a serious bleeding problem, such as long-lasting bleeding after surgeries or cuts?  No - Have you ever had anemia or been told to take iron pills?  No - Have you had any abnormal blood loss such as black, tarry or bloody stools, or abnormal vaginal bleeding?  No - Have you ever had a blood transfusion?  Yes - Are you willing to have a blood transfusion if it is medically needed before, during, or after your surgery?  YES, PREOPERATIVE ANESTHETICS. - HAVE YOU OR ANYONE IN YOUR FAMILY EVER HAD PROBLEMS WITH ANESTHESIA (SEDATION FOR SURGERY)?   YES, SLEEP APNEA, HAS CPAP MACHINE. - DO YOU HAVE SLEEP APNEA, EXCESSIVE SNORING, OR DAYTIME  DROWSINESS?   YES, BIG TOES. - DO YOU HAVE ANY ARTIFICIAL JOINTS?   No - Are you allergic to latex?  No - Is there any chance that you may be pregnant?      Assessment & Plan     The proposed surgical procedure is considered LOW risk.    Problem List Items Addressed This Visit     Paroxysmal atrial fibrillation (H)     On Eliquis, will stop three days before.           Relevant Orders    EKG 12-lead complete w/read - Clinics (Completed)    SUHA (obstructive sleep apnea)     On Cpap machine and tolerating well             Essential hypertension       Well controlled on current Medications:      diltiazem ER COATED BEADS (CARDIZEM CD/CARTIA XT) 240 MG 24 hr capsule, Take 1 capsule (240 mg) by mouth daily, Disp: 30 capsule, Rfl: 0     triamterene-HCTZ (MAXZIDE) 75-50 MG tablet, Take 1 tablet by mouth daily, Disp: , Rfl:              Carpal tunnel syndrome of right wrist     Has been an issue for 8-9 years and now ready for surgical intervention           Benign prostatic hyperplasia with nocturia     Currently getting up 1-2 times most nights.             Other Visit Diagnoses     Preop general physical exam    -  Primary    Relevant Orders    CBC with Diff/Plt ([facility]) (Completed)    EKG 12-lead complete w/read - Clinics (Completed)    VENOUS COLLECTION (Completed)              Risks and Recommendations:  The patient has the following additional risks and recommendations for perioperative complications:    Anemia/Bleeding/Clotting:   Will hold elaquis    Medication Instructions:  Patient is to take all scheduled medications on the day of surgery EXCEPT for modifications listed below:   - apixaban (Eliquis), edoxaban (Savaysa), rivaroxaban (Xarelto): Bleeding risk is moderate or high for this procedure AND CrCl  (>=) 50 mL/min. HOLD 2 days before surgery.    - Calcium Channel Blockers: May be continued on the day of surgery.   - Diuretics: HOLD on the day of surgery.    RECOMMENDATION:  APPROVAL GIVEN to proceed with  proposed procedure, without further diagnostic evaluation.    Review of external notes as documented above           Subjective     HPI related to upcoming procedure: end stage Carpal tunnel       Health Care Directive:  Patient does not have a Health Care Directive or Living Will: As is reasonable care for most folks, In the short term, he wants usual aggressive medical care.   No desire for long term prolongation of life through artificial means if no hope to bring back to a reasonable status.      Preoperative Review of :   reviewed - no record of controlled substances prescribed.      Status of Chronic Conditions:  See problem list for active medical problems.  Problems all longstanding and stable, except as noted/documented.  See ROS for pertinent symptoms related to these conditions.      Review of Systems  Constitutional, neuro, ENT, endocrine, pulmonary, cardiac, gastrointestinal, genitourinary, musculoskeletal, integument and psychiatric systems are negative, except as otherwise noted.    Patient Active Problem List    Diagnosis Date Noted     Paroxysmal atrial fibrillation (H) 03/21/2022     Priority: Medium     Dx'd 6/2021, on Eliquis        Carpal tunnel syndrome of right wrist 03/21/2022     Priority: Medium     SUHA (obstructive sleep apnea) 11/09/2021     Priority: Medium     Benign neoplasm of ascending colon 09/29/2021     Priority: Medium     Benign neoplasm of cecum 09/29/2021     Priority: Medium     Benign prostatic hyperplasia with nocturia 03/05/2018     Priority: Medium     Encounter for long-term (current) use of high-risk medication 01/31/2018     Priority: Medium     Dry skin 12/04/2014     Priority: Medium     Advanced directives, counseling/discussion 11/06/2012     Priority: Medium     As is reasonable care for Most of us, wants in the Short term aggressive care.   No desire for long term prolongation of life through artificial means if no hope to bring back to a reasonable status.  "         Hypokalemia 11/17/2011     Priority: Medium     Essential hypertension 03/19/2008     Priority: Medium      Past Medical History:   Diagnosis Date     Dry skin 12/4/2014     HTN, goal below 140/90      Hyperlipidaemia LDL goal < 100      Hypokalemia 11/17/2011     Renal cyst 01/25/2008    Small. Seen on renal US.     Past Surgical History:   Procedure Laterality Date     ARTHROSCOPY KNEE RT/LT Left 1990's     ARTHROSCOPY KNEE RT/LT Left      ARTHROSCOPY KNEE RT/LT Left     Leon, Mark     ARTHROSCOPY KNEE RT/LT Left 2003ish    Leon, Mark     ARTHROSCOPY KNEE RT/LT Right 2005    Leon, Mark     HERNIA REPAIR, INGUINAL RT/LT Right 2004    Incarerated hernia with resection, Dr. Moraes     VASECTOMY  1988     Current Outpatient Medications   Medication Sig Dispense Refill     acetaminophen (TYLENOL) 500 MG tablet Take 500 mg by mouth       coenzyme Q-10 200 MG CAPS capsule Take  by mouth daily.       diltiazem ER COATED BEADS (CARDIZEM CD/CARTIA XT) 240 MG 24 hr capsule Take 1 capsule (240 mg) by mouth daily 30 capsule 0     ELIQUIS ANTICOAGULANT 2.5 MG tablet Take 5 mg by mouth daily       MISC NATURAL PRODUCTS PO Take by mouth daily \"Total restore\" and \"Fungus Free\"       potassium chloride ER (K-TAB) 20 MEQ CR tablet Take 1 tablet (20 mEq) by mouth daily 90 tablet 2     triamterene-HCTZ (MAXZIDE) 75-50 MG tablet Take 1 tablet by mouth daily         Allergies   Allergen Reactions     Penicillins Anaphylaxis and Unknown     Rash, shortness of breath, swelling     Amlodipine      Hytrin [Terazosin]      Lisinopril Cough     02/19/21 -- reviewed paper chart seeking info/reason lisinopril was stopped. Found note from to Dr. Wray from 9/2009 reporting stopped lisinopril due to cough. Had experimented going off a few times over some time and reported in note that every time he tries the lisinopril it causes a cough.      Losartan      Metoprolol      Made BP go higher     Chlorthalidone Rash, " "Blisters and Dermatitis        Social History     Tobacco Use     Smoking status: Never Smoker     Smokeless tobacco: Never Used   Substance Use Topics     Alcohol use: Yes     Alcohol/week: 0.0 - 1.0 standard drinks     Comment: ocassional     Family History   Problem Relation Age of Onset     Hypertension Brother      Heart Disease Brother      Diabetes Brother      No Known Problems Son      No Known Problems Son      No Known Problems Daughter      History   Drug Use No         Objective     /76   Pulse 53   Temp 97.8  F (36.6  C)   Ht 1.778 m (5' 10\")   Wt 89.2 kg (196 lb 9.6 oz)   SpO2 96%   BMI 28.21 kg/m      Physical Exam    GENERAL APPEARANCE: healthy, alert and no distress     EYES: EOMI,  PERRL     HENT: ear canals and TM's normal and nose and mouth without ulcers or lesions     NECK: no adenopathy, no asymmetry, masses, or scars and thyroid normal to palpation     RESP: lungs clear to auscultation - no rales, rhonchi or wheezes     CV: regular rates and rhythm, normal S1 S2, no S3 or S4 and no murmur, click or rub     ABDOMEN:  soft, nontender, no HSM or masses and bowel sounds normal     MS: extremities normal- no gross deformities noted, no evidence of inflammation in joints, FROM in all extremities.     SKIN: no suspicious lesions or rashes     NEURO: Normal strength and tone, sensory exam grossly normal, mentation intact and speech normal     PSYCH: mentation appears normal. and affect normal/bright     LYMPHATICS: No cervical adenopathy    Recent Labs   Lab Test 11/09/21  1143 07/21/21  0823 06/24/21  1918 05/12/21  1028 05/12/21  0000   HGB  --   --  14.7  --  13.5   PLT  --   --  253  --  214    143 140   < >  --    POTASSIUM 3.6 3.3* 3.7   < >  --    CR 1.47* 1.46* 1.70*   < >  --     < > = values in this interval not displayed.        Diagnostics:  Recent Results (from the past 24 hour(s))   CBC with Diff/Plt ([facility])    Collection Time: 06/21/22 12:00 AM   Result Value Ref Range "    WBC x10/cmm 5.3 3.8 - 11.0 x10/cmm    % Lymphocytes 25.8 20.5 - 51.1 %    % Monocytes 8.2 1.7 - 9.3 %    % Granulocytes 66.0 42.2 - 75.2 %    RBC x10/cmm 4.46 4.2 - 5.9 x10/cmm    Hemoglobin 13.3 (A) 13.4 - 17.5 g/dl    Hematocrit 37.8 (A) 39 - 51 %    MCV 84.7 80 - 100 fL    MCH 29.8 27.0 - 31.0 pg    MCHC 35.2 33.0 - 37.0 g/dL    Platelet Count 217 140 - 450 K/uL      EKG: sinus bradycardia, normal axis, normal intervals, no acute ST/T changes c/w ischemia, no LVH by voltage criteria, unchanged from previous tracings    Revised Cardiac Risk Index (RCRI):  The patient has the following serious cardiovascular risks for perioperative complications:   - No serious cardiac risks = 0 points     RCRI Interpretation: 0 points: Class I (very low risk - 0.4% complication rate)           Signed Electronically by: El Wray MD  Copy of this evaluation report is provided to requesting physician.

## 2022-06-20 NOTE — PATIENT INSTRUCTIONS
Preparing for Your Surgery  Getting started  A nurse will call you to review your health history and instructions. They will give you an arrival time based on your scheduled surgery time. Please be ready to share:    Your doctor's clinic name and phone number    Your medical, surgical and anesthesia history    A list of allergies and sensitivities    A list of medicines, including herbal treatments and over-the-counter drugs    Whether the patient has a legal guardian (ask how to send us the papers in advance)  Please tell us if you're pregnant--or if there's any chance you might be pregnant. Some surgeries may injure a fetus (unborn baby), so they require a pregnancy test. Surgeries that are safe for a fetus don't always need a test, and you can choose whether to have one.   If you have a child who's having surgery, please ask for a copy of Preparing for Your Child's Surgery.    Preparing for surgery    Within 30 days of surgery: Have a pre-op exam (sometimes called an H&P, or History and Physical). This can be done at a clinic or pre-operative center.  ? If you're having a , you may not need this exam. Talk to your care team.    At your pre-op exam, talk to your care team about all medicines you take. If you need to stop any medicines before surgery, ask when to start taking them again.  ? We do this for your safety. Many medicines can make you bleed too much during surgery. Some change how well surgery (anesthesia) drugs work.    Call your insurance company to let them know you're having surgery. (If you don't have insurance, call [phone].)    Call your clinic if there's any change in your health. This includes signs of a cold or flu (sore throat, runny nose, cough, rash, fever). It also includes a scrape or scratch near the surgery site.    If you have questions on the day of surgery, call your hospital or surgery center.  COVID testing  You may need to be tested for COVID-19 before having  surgery. If so, we will give you instructions.  Eating and drinking guidelines  For your safety: Unless your surgeon tells you otherwise, follow the guidelines below.    Eat and drink as usual until 8 hours before surgery. After that, no food or milk.    Drink clear liquids until 2 hours before surgery. These are liquids you can see through, like water, Gatorade and Propel Water. You may also have black coffee and tea (no cream or milk).    Nothing by mouth within 2 hours of surgery. This includes gum, candy and breath mints.    If you drink alcohol: Stop drinking it the night before surgery.    If your care team tells you to take medicine on the morning of surgery, it's okay to take it with a sip of water.  Preventing infection    Shower or bathe the night before and morning of your surgery. Follow the instructions your clinic gave you. (If no instructions, use regular soap.)    Don't shave or clip hair near your surgery site. We'll remove the hair if needed.    Don't smoke or vape the morning of surgery. You may chew nicotine gum up to 2 hours before surgery. A nicotine patch is okay.  ? Note: Some surgeries require you to completely quit smoking and nicotine. Check with your surgeon.    Your care team will make every effort to keep you safe from infection. We will:  ? Clean our hands often with soap and water (or an alcohol-based hand rub).  ? Clean the skin at your surgery site with a special soap that kills germs.  ? Give you a special gown to keep you warm. (Cold raises the risk of infection.)  ? Wear special hair covers, masks, gowns and gloves during surgery.  ? Give antibiotic medicine, if prescribed. Not all surgeries need antibiotics.  What to bring on the day of surgery    Photo ID and insurance card    Copy of your health care directive, if you have one    Glasses and hearing aides (bring cases)  ? You can't wear contacts during surgery    Inhaler and eye drops, if you use them (tell us about these when  you arrive)    CPAP machine or breathing device, if you use them    A few personal items, if spending the night    If you have . . .  ? A pacemaker, ICD (cardiac defibrillator) or other implant: Bring the ID card.  ? An implanted stimulator: Bring the remote control.  ? A legal guardian: Bring a copy of the certified (court-stamped) guardianship papers.  Please remove any jewelry, including body piercings. Leave jewelry and other valuables at home.  If you're going home the day of surgery    You must have a responsible adult drive you home. They should stay with you overnight as well.    If you don't have someone to stay with you, and you aren't safe to go home alone, we may keep you overnight. Insurance often won't pay for this.  After surgery  If it's hard to control your pain or you need more pain medicine, please call your surgeon's office.  Questions?   If you have any questions for your care team, list them here: _________________________________________________________________________________________________________________________________________________________________________ ____________________________________ ____________________________________ ____________________________________  For informational purposes only. Not to replace the advice of your health care provider. Copyright   2003, 2019 [facility]. All rights reserved. Clinically reviewed by Kayli Gonzalez MD. [employer] [id number] - REV 07/21.

## 2022-06-21 ENCOUNTER — OFFICE VISIT (OUTPATIENT)
Dept: FAMILY MEDICINE | Facility: CLINIC | Age: 67
End: 2022-06-21

## 2022-06-21 VITALS
OXYGEN SATURATION: 96 % | HEIGHT: 70 IN | HEART RATE: 53 BPM | BODY MASS INDEX: 28.15 KG/M2 | TEMPERATURE: 97.8 F | DIASTOLIC BLOOD PRESSURE: 76 MMHG | SYSTOLIC BLOOD PRESSURE: 134 MMHG | WEIGHT: 196.6 LBS

## 2022-06-21 DIAGNOSIS — G47.33 OSA (OBSTRUCTIVE SLEEP APNEA): ICD-10-CM

## 2022-06-21 DIAGNOSIS — I48.0 PAROXYSMAL ATRIAL FIBRILLATION (H): ICD-10-CM

## 2022-06-21 DIAGNOSIS — G56.01 CARPAL TUNNEL SYNDROME OF RIGHT WRIST: ICD-10-CM

## 2022-06-21 DIAGNOSIS — I10 ESSENTIAL HYPERTENSION: ICD-10-CM

## 2022-06-21 DIAGNOSIS — N40.1 BENIGN PROSTATIC HYPERPLASIA WITH NOCTURIA: ICD-10-CM

## 2022-06-21 DIAGNOSIS — R35.1 BENIGN PROSTATIC HYPERPLASIA WITH NOCTURIA: ICD-10-CM

## 2022-06-21 DIAGNOSIS — Z01.818 PREOP GENERAL PHYSICAL EXAM: Primary | ICD-10-CM

## 2022-06-21 PROBLEM — M79.662 PAIN OF LEFT LOWER LEG: Status: RESOLVED | Noted: 2022-03-21 | Resolved: 2022-06-21

## 2022-06-21 PROBLEM — I83.893 VARICOSE VEINS OF BOTH LEGS WITH EDEMA: Status: RESOLVED | Noted: 2017-06-13 | Resolved: 2022-06-21

## 2022-06-21 PROBLEM — K63.5 POLYP OF COLON: Status: RESOLVED | Noted: 2021-09-27 | Resolved: 2022-06-21

## 2022-06-21 LAB
% GRANULOCYTES: 66 % (ref 42.2–75.2)
HCT VFR BLD AUTO: 37.8 % (ref 39–51)
HEMOGLOBIN: 13.3 G/DL (ref 13.4–17.5)
LYMPHOCYTES NFR BLD AUTO: 25.8 % (ref 20.5–51.1)
MCH RBC QN AUTO: 29.8 PG (ref 27–31)
MCHC RBC AUTO-ENTMCNC: 35.2 G/DL (ref 33–37)
MCV RBC AUTO: 84.7 FL (ref 80–100)
MONOCYTES NFR BLD AUTO: 8.2 % (ref 1.7–9.3)
PLATELET # BLD AUTO: 217 K/UL (ref 140–450)
RBC # BLD AUTO: 4.46 X10/CMM (ref 4.2–5.9)
WBC # BLD AUTO: 5.3 X10/CMM (ref 3.8–11)

## 2022-06-21 PROCEDURE — 99215 OFFICE O/P EST HI 40 MIN: CPT | Performed by: FAMILY MEDICINE

## 2022-06-21 PROCEDURE — 36415 COLL VENOUS BLD VENIPUNCTURE: CPT | Performed by: FAMILY MEDICINE

## 2022-06-21 PROCEDURE — 93000 ELECTROCARDIOGRAM COMPLETE: CPT | Performed by: FAMILY MEDICINE

## 2022-06-21 PROCEDURE — 85025 COMPLETE CBC W/AUTO DIFF WBC: CPT | Performed by: FAMILY MEDICINE

## 2022-06-21 RX ORDER — APIXABAN 2.5 MG/1
5 TABLET, FILM COATED ORAL DAILY
COMMUNITY
Start: 2022-06-05 | End: 2023-08-02

## 2022-06-21 RX ORDER — TRIAMTERENE AND HYDROCHLOROTHIAZIDE 75; 50 MG/1; MG/1
1 TABLET ORAL DAILY
COMMUNITY
Start: 2022-05-13 | End: 2023-08-02

## 2022-06-21 NOTE — ASSESSMENT & PLAN NOTE
Well controlled on current Medications:      diltiazem ER COATED BEADS (CARDIZEM CD/CARTIA XT) 240 MG 24 hr capsule, Take 1 capsule (240 mg) by mouth daily, Disp: 30 capsule, Rfl: 0     triamterene-HCTZ (MAXZIDE) 75-50 MG tablet, Take 1 tablet by mouth daily, Disp: , Rfl:

## 2022-06-30 NOTE — PROGRESS NOTES
6/21/22 faxed preop and EKG to [facility] @ [phone]    Carson Quiroz,   [facility]  [phone]

## 2022-06-30 NOTE — PROGRESS NOTES
6/27/22 faxed this preop and EKG to [facility] @ [phone]    Carson Quiroz,   [facility]  [phone]

## 2022-07-21 ENCOUNTER — VIRTUAL VISIT (OUTPATIENT)
Dept: FAMILY MEDICINE | Facility: CLINIC | Age: 67
End: 2022-07-21

## 2022-07-21 ENCOUNTER — LAB (OUTPATIENT)
Dept: URGENT CARE | Facility: URGENT CARE | Age: 67
End: 2022-07-21
Attending: NURSE PRACTITIONER
Payer: COMMERCIAL

## 2022-07-21 DIAGNOSIS — Z20.822 SUSPECTED 2019 NOVEL CORONAVIRUS INFECTION: Primary | ICD-10-CM

## 2022-07-21 DIAGNOSIS — Z20.822 SUSPECTED 2019 NOVEL CORONAVIRUS INFECTION: ICD-10-CM

## 2022-07-21 DIAGNOSIS — J02.9 SORE THROAT: ICD-10-CM

## 2022-07-21 PROBLEM — D12.0 BENIGN NEOPLASM OF CECUM: Status: RESOLVED | Noted: 2021-09-29 | Resolved: 2022-07-21

## 2022-07-21 PROBLEM — D12.2 BENIGN NEOPLASM OF ASCENDING COLON: Status: RESOLVED | Noted: 2021-09-29 | Resolved: 2022-07-21

## 2022-07-21 LAB — SARS-COV-2 RNA RESP QL NAA+PROBE: POSITIVE

## 2022-07-21 PROCEDURE — 99213 OFFICE O/P EST LOW 20 MIN: CPT | Mod: 95 | Performed by: NURSE PRACTITIONER

## 2022-07-21 PROCEDURE — U0003 INFECTIOUS AGENT DETECTION BY NUCLEIC ACID (DNA OR RNA); SEVERE ACUTE RESPIRATORY SYNDROME CORONAVIRUS 2 (SARS-COV-2) (CORONAVIRUS DISEASE [COVID-19]), AMPLIFIED PROBE TECHNIQUE, MAKING USE OF HIGH THROUGHPUT TECHNOLOGIES AS DESCRIBED BY CMS-2020-01-R: HCPCS

## 2022-07-21 PROCEDURE — U0005 INFEC AGEN DETEC AMPLI PROBE: HCPCS

## 2022-07-21 NOTE — PATIENT INSTRUCTIONS
Call [phone] to schedule Covid-19 testing or schedule via [employer]    Do not return to work until you know test result is negative.     IF POSITIVE, then you need to quarantine at home through Sunday 7/24. Then if not coughing, no fever can go out WITH MASK ON through Friday 7/29.    We can discuss antiviral treatments if you are positive    *Rest  *Drink Fluids (water)     *For aches or fevers:  Try acetaminophen (Tylenol) or ibuprofen (Motrin) for your aches and discomfort, unless you've been previously told to avoid these medications for other health reasons.     *For sinus congestion or stuffy nose: A Neti Pot can be found at most pharmacies. You don't need a prescription. Use it twice daily. You can find a 3 minute instructional video on YouTube if you have never used a Neti Pot before. (follow package instructions)   Start Flonase: inhale 2 sprays into each nostril once a day. Decrease to 1 spray each nostril as symptoms improve and discontinue the medication once symptoms resolve.      *For thick mucus: Guaifenesin-D (Mucinex-D) can be found behind the counter by asking the pharmacist. If you can't use decongestants for medical reasons, try regular Guaifenesin or Guaifenesin-DM instead.     *Cough: Cough syrups with dextromethorphan (DM), such as Robitussin DM can help decrease your cough at night to help you sleep. If that's not enough, you can request a stronger prescription-strength cough medicine.      *Sore throat: Gargle with salt water. Drink warm liquids with a tablespoon of honey.

## 2022-07-21 NOTE — PROGRESS NOTES
Problem(s) Oriented visit      Video-Visit Details    Type of service:  Video Visit    Video Start Time (time video started): 1307    Video End Time (time video stopped): 1320    Originating Location (pt. Location): Home    Distant Location (provider location):  [facility]     Mode of Communication:  Video Conference via [facility]    Physician has received verbal consent for a Video Visit from the patient? Yes    This was a virtual video-visit conducted during COVID-19 outbreak in regulation with social distancing and quarantine recommendations of the CDC and [place] department of health and human services. A two way audio/video connection was used in real time with patient's consent.    KEVIN Bliss CNP    SUBJECTIVE:                                                    Abdiaziz Kimball is a 66 year old male who presents to clinic today for the following health issues :    Thinks he has the flu. Symptoms started Tuesday (2 days ago) with mild sore throat, uncomfortable weak feeling, shaky. Temperature has remained normal. Symptoms worse yesterday but worked 12 hours. Today feeling better than yesterday. Has not taken a Covid test yet. Does not have any rapid tests at home. Denies chest pain, pressure, palpitations, shortness of breath, nausea, vomiting, diarrhea.    Problem list, Medication list, Allergies, and Medical/Social/Surgical histories reviewed in [facility] and updated as appropriate.   Additional history: as documented    ROS:  5 point ROS completed and negative except noted above, including Gen, HENT, CV, Resp, GI    OBJECTIVE:                                                    No vitals taken due to telehealth visit     APPEARANCE: = Adult male, acutely ill, no distress  Conj/Eyelids = noninjected and lids and lashes are without inflammation  Ears/Nose = External structures and Nares have usual shape and form  Resp effort = Calm regular breathing, no cough  Recent/Remote Memory = Alert and  Oriented x 3  Mood/Affect = Cooperative and interested     ASSESSMENT/PLAN:                                                      Abdiaziz was seen today for covid concern.    Diagnoses and all orders for this visit:    Suspected 2019 novel coronavirus infection  -     Symptomatic; Yes; 7/19/2022 COVID-19 Virus (Coronavirus) by PCR Nose; Future    Sore throat  -     Symptomatic; Yes; 7/19/2022 COVID-19 Virus (Coronavirus) by PCR Nose; Future    Suspect viral Covid-19 infection as cause of current symptoms. PCR test ordered for patient and he scheduled appointment with [facility] this afternoon. Also plans to  rapid test.  Symptomatic cares and staying home until better discussed. Quarantine if covid +    See Patient Instructions  Patient Instructions   Call [phone] to schedule Covid-19 testing or schedule via [employer]    Do not return to work until you know test result is negative.     IF POSITIVE, then you need to quarantine at home through Sunday 7/24. Then if not coughing, no fever can go out WITH MASK ON through Friday 7/29.    We can discuss antiviral treatments if you are positive    *Rest  *Drink Fluids (water)     *For aches or fevers:  Try acetaminophen (Tylenol) or ibuprofen (Motrin) for your aches and discomfort, unless you've been previously told to avoid these medications for other health reasons.     *For sinus congestion or stuffy nose: A Neti Pot can be found at most pharmacies. You don't need a prescription. Use it twice daily. You can find a 3 minute instructional video on YouTube if you have never used a Neti Pot before. (follow package instructions)   Start Flonase: inhale 2 sprays into each nostril once a day. Decrease to 1 spray each nostril as symptoms improve and discontinue the medication once symptoms resolve.      *For thick mucus: Guaifenesin-D (Mucinex-D) can be found behind the counter by asking the pharmacist. If you can't use decongestants for medical reasons,  try regular Guaifenesin or Guaifenesin-DM instead.     *Cough: Cough syrups with dextromethorphan (DM), such as Robitussin DM can help decrease your cough at night to help you sleep. If that's not enough, you can request a stronger prescription-strength cough medicine.      *Sore throat: Gargle with salt water. Drink warm liquids with a tablespoon of honey.        KEVIN Bliss CNP  [facility]  Family Practice  [facility]  [phone]    For any issues my office # is [phone]

## 2022-07-22 ENCOUNTER — TELEPHONE (OUTPATIENT)
Dept: NURSING | Facility: CLINIC | Age: 67
End: 2022-07-22

## 2022-07-22 NOTE — TELEPHONE ENCOUNTER
Coronavirus (COVID-19) Notification    Caller Name (Patient, parent, daughter/son, grandparent, etc)  patient    Reason for call  Notify of Positive Coronavirus (COVID-19) lab results, assess symptoms,  review [facility] recommendations    Lab Result    Lab test:  2019-nCoV rRt-PCR or SARS-CoV-2 PCR    Oropharyngeal AND/OR nasopharyngeal swabs is POSITIVE for 2019-nCoV RNA/SARS-COV-2 PCR (COVID-19 virus)      Gather patient reported symptoms   Assessment   Current Symptoms at time of phone call, reported by patient: (if no symptoms, document: No symptoms] cough   Date of symptom(s) onset (if applicable) 7/19     If at time of call, Patients symptoms have worsened, the Patient should contact 911 or have someone drive them to Emergency Dept promptly:      If Patient calling 911, inform 911 personal that you have tested positive for the Coronavirus (COVID-19).  Place mask on and await 911 to arrive.    If Emergency Dept, If possible, please have another adult drive you to the Emergency Dept but you need to wear mask when in contact with other people.      Treatment Options:   Patient classified as COVID treatment eligible by Epic high risk algorithm: Yes  Is the patient symptomatic at the time of result notification? Yes. Was the onset of symptoms within the last 5 days? Yes.   There are now oral medications available for the treatment of COVID-19.  Taking one of these medications within the first five days of symptoms (when people may not yet feel severely ill) has been shown to make people feel better, prevent them from getting sicker, and preventing hospitalization and death.   Does the patient agree to have a visit with a provider to discuss treatment options? No.  Reason patient declined:  Already discussed with provider and determined don't need      Review information with Patient    Your result was positive. This means you have COVID-19 (coronavirus).    How can I protect others?    These guidelines are  for isolating before returning to work, school or .    If you DO have symptoms    Stay home and away from others     For at least 5 days after your symptoms started, AND    You are fever free for 24 hours (with no medicine that reduces fever), AND    Your other symptoms are better    Wear a mask for 10 full days anytime you are around others    If you DON'T have symptoms    Stay home and away from others for at least 5 days after your positive test    Wear a mask for 10 full days anytime you are around others    There may be different guidelines for healthcare facilities.  Please check with the specific sites before arriving.    If you have been told by a doctor that you were severely ill with COVID-19 or are immunocompromised, you should isolate for at least 10 days.    You should not go back to work until you meet the guidelines above for ending your home isolation. You don't need to be retested for COVID-19 before going back to work--studies show that you won't spread the virus if it's been at least 10 days since your symptoms started (or 20 days, if you have a weak immune system).    Employers, schools, and daycares: This is an official notice for this person's medical guidelines for returning in-person.  They must meet the above guidelines before going back to work, school or  in person.    You will receive a positive COVID-19 letter via [employer] or the mail soon with additional self-care information.    Would you like me to review some of that information with you now?  No    If you were tested for an upcoming procedure, please contact your provider for next steps.    Kirsten Mcadams

## 2022-09-09 NOTE — PATIENT INSTRUCTIONS
Preparing for Your Surgery  Getting started  A nurse will call you to review your health history and instructions. They will give you an arrival time based on your scheduled surgery time. Please be ready to share:    Your doctor's clinic name and phone number    Your medical, surgical and anesthesia history    A list of allergies and sensitivities    A list of medicines, including herbal treatments and over-the-counter drugs    Whether the patient has a legal guardian (ask how to send us the papers in advance)  Please tell us if you're pregnant--or if there's any chance you might be pregnant. Some surgeries may injure a fetus (unborn baby), so they require a pregnancy test. Surgeries that are safe for a fetus don't always need a test, and you can choose whether to have one.   If you have a child who's having surgery, please ask for a copy of Preparing for Your Child's Surgery.    Preparing for surgery    Within 30 days of surgery: Have a pre-op exam (sometimes called an H&P, or History and Physical). This can be done at a clinic or pre-operative center.  ? If you're having a , you may not need this exam. Talk to your care team.    At your pre-op exam, talk to your care team about all medicines you take. If you need to stop any medicines before surgery, ask when to start taking them again.  ? We do this for your safety. Many medicines can make you bleed too much during surgery. Some change how well surgery (anesthesia) drugs work.    Call your insurance company to let them know you're having surgery. (If you don't have insurance, call [phone].)    Call your clinic if there's any change in your health. This includes signs of a cold or flu (sore throat, runny nose, cough, rash, fever). It also includes a scrape or scratch near the surgery site.    If you have questions on the day of surgery, call your hospital or surgery center.  COVID testing  You may need to be tested for COVID-19 before having  surgery. If so, we will give you instructions.  Eating and drinking guidelines  For your safety: Unless your surgeon tells you otherwise, follow the guidelines below.    Eat and drink as usual until 8 hours before surgery. After that, no food or milk.    Drink clear liquids until 2 hours before surgery. These are liquids you can see through, like water, Gatorade and Propel Water. You may also have black coffee and tea (no cream or milk).    Nothing by mouth within 2 hours of surgery. This includes gum, candy and breath mints.    If you drink alcohol: Stop drinking it the night before surgery.    If your care team tells you to take medicine on the morning of surgery, it's okay to take it with a sip of water.  Preventing infection    Shower or bathe the night before and morning of your surgery. Follow the instructions your clinic gave you. (If no instructions, use regular soap.)    Don't shave or clip hair near your surgery site. We'll remove the hair if needed.    Don't smoke or vape the morning of surgery. You may chew nicotine gum up to 2 hours before surgery. A nicotine patch is okay.  ? Note: Some surgeries require you to completely quit smoking and nicotine. Check with your surgeon.    Your care team will make every effort to keep you safe from infection. We will:  ? Clean our hands often with soap and water (or an alcohol-based hand rub).  ? Clean the skin at your surgery site with a special soap that kills germs.  ? Give you a special gown to keep you warm. (Cold raises the risk of infection.)  ? Wear special hair covers, masks, gowns and gloves during surgery.  ? Give antibiotic medicine, if prescribed. Not all surgeries need antibiotics.  What to bring on the day of surgery    Photo ID and insurance card    Copy of your health care directive, if you have one    Glasses and hearing aides (bring cases)  ? You can't wear contacts during surgery    Inhaler and eye drops, if you use them (tell us about these when  you arrive)    CPAP machine or breathing device, if you use them    A few personal items, if spending the night    If you have . . .  ? A pacemaker, ICD (cardiac defibrillator) or other implant: Bring the ID card.  ? An implanted stimulator: Bring the remote control.  ? A legal guardian: Bring a copy of the certified (court-stamped) guardianship papers.  Please remove any jewelry, including body piercings. Leave jewelry and other valuables at home.  If you're going home the day of surgery    You must have a responsible adult drive you home. They should stay with you overnight as well.    If you don't have someone to stay with you, and you aren't safe to go home alone, we may keep you overnight. Insurance often won't pay for this.  After surgery  If it's hard to control your pain or you need more pain medicine, please call your surgeon's office.  Questions?   If you have any questions for your care team, list them here: _________________________________________________________________________________________________________________________________________________________________________ ____________________________________ ____________________________________ ____________________________________  For informational purposes only. Not to replace the advice of your health care provider. Copyright   2003, 2019 [facility]. All rights reserved. Clinically reviewed by Kayli Gonzalez MD. I-DISPO [id number] - REV 07/21.

## 2022-09-09 NOTE — PROGRESS NOTES
[facility] [address] [place] [address]  Phone: [phone]  Fax: [phone]  Primary Provider: Bertin Wray  Pre-op Performing Provider: BERTIN WRAY      PREOPERATIVE EVALUATION:  Today's date: 9/12/2022    Abdiaziz Kimball is a 66 year old male who presents for a preoperative evaluation.    Surgical Information:  Surgery/Procedure: left carpel tunnel   Surgery Location: [facility]   Surgeon: Jose Joseph  Surgery Date: 9/20/22  Time of Surgery: TBD  Where patient plans to recover: At home with family  Fax number for surgical facility: [phone]    Type of Anesthesia Anticipated: to be determined    Assessment & Plan     The proposed surgical procedure is considered LOW risk.    Preop general physical exam  for  Carpal tunnel syndrome of Left wrist  Also has a   Ganglion cyst of wrist, left  He will ask surgeon if this is also part of the planned intervention    Paroxysmal atrial fibrillation (H)  Hold eliquis for 3 days.    SUHA (obstructive sleep apnea)  noted           Risks and Recommendations:  The patient has the following additional risks and recommendations for perioperative complications:   - No identified additional risk factors other than previously addressed    Medication Instructions:  Patient is to take all scheduled medications on the day of surgery EXCEPT for modifications listed below:   - apixaban (Eliquis), edoxaban (Savaysa), rivaroxaban (Xarelto): Neuraxial or regional block anticipated AND CrCL (>=) 50mL/min. HOLD 3 days before surgery.    - Diuretics: HOLD on the day of surgery.    RECOMMENDATION:  APPROVAL GIVEN to proceed with proposed procedure, without further diagnostic evaluation.    Review of external notes as documented above       Subjective     HPI related to upcoming procedure: Bilateral carpal tunnel, right much improved post June surgery.  Has weakness in the left side and also an overlying apparent ganglion cyst as  well.      Preop Questions 9/10/2022   1. Have you ever had a heart attack or stroke? No   2. Have you ever had surgery on your heart or blood vessels, such as a stent placement, a coronary artery bypass, or surgery on an artery in your head, neck, heart, or legs? YES - surgery on veins in left legs    3. Do you have chest pain with activity? No   4. Do you have a history of  heart failure? No   5. Do you currently have a cold, bronchitis or symptoms of other infection? No   6. Do you have a cough, shortness of breath, or wheezing? No   7. Do you or anyone in your family have previous history of blood clots? No   8. Do you or does anyone in your family have a serious bleeding problem such as prolonged bleeding following surgeries or cuts? No   9. Have you ever had problems with anemia or been told to take iron pills? No   10. Have you had any abnormal blood loss such as black, tarry or bloody stools? No   11. Have you ever had a blood transfusion? No   12. Are you willing to have a blood transfusion if it is medically needed before, during, or after your surgery? Yes   13. Have you or any of your relatives ever had problems with anesthesia? YES - Patient   14. Do you have sleep apnea, excessive snoring or daytime drowsiness? YES - Sleep Apnea   14a. Do you have a CPAP machine? Yes   15. Do you have any artifical heart valves or other implanted medical devices like a pacemaker, defibrillator, or continuous glucose monitor? No   16. Do you have artificial joints? YES - 2 on toe, both big toes    17. Are you allergic to latex? No     Health Care Directive:  Patient does not have a Health Care Directive or Living Will: As is reasonable care for most folks, In the short term, he wants usual aggressive medical care.   No desire for long term prolongation of life through artificial means if no hope to bring back to a reasonable status.      Preoperative Review of :   reviewed - no record of controlled substances  prescribed.      Status of Chronic Conditions:  See problem list for active medical problems.  Problems all longstanding and stable, except as noted/documented.  See ROS for pertinent symptoms related to these conditions.      Review of Systems  Constitutional, neuro, ENT, endocrine, pulmonary, cardiac, gastrointestinal, genitourinary, musculoskeletal, integument and psychiatric systems are negative, except as otherwise noted.    Patient Active Problem List    Diagnosis Date Noted     Paroxysmal atrial fibrillation (H) 03/21/2022     Priority: Medium     Dx'd 6/2021, on Eliquis        Carpal tunnel syndrome of right wrist 03/21/2022     Priority: Medium     SUHA (obstructive sleep apnea) 11/09/2021     Priority: Medium     Benign prostatic hyperplasia with nocturia 03/05/2018     Priority: Medium     Encounter for long-term (current) use of high-risk medication 01/31/2018     Priority: Medium     Advanced directives, counseling/discussion 11/06/2012     Priority: Medium     As is reasonable care for Most of us, wants in the Short term aggressive care.   No desire for long term prolongation of life through artificial means if no hope to bring back to a reasonable status.          Hypokalemia 11/17/2011     Priority: Medium     Essential hypertension 03/19/2008     Priority: Medium      Past Medical History:   Diagnosis Date     Benign neoplasm of ascending colon 9/29/2021     Benign neoplasm of cecum 9/29/2021     Dry skin 12/4/2014     HTN, goal below 140/90      Hyperlipidaemia LDL goal < 100      Hypokalemia 11/17/2011     Renal cyst 01/25/2008    Small. Seen on renal US.     Past Surgical History:   Procedure Laterality Date     ARTHROSCOPY KNEE RT/LT Left 1990's     ARTHROSCOPY KNEE RT/LT Left      ARTHROSCOPY KNEE RT/LT Left     [facility] Quintero     ARTHROSCOPY KNEE RT/LT Left 2003ish    Mark Quintero     ARTHROSCOPY KNEE RT/LT Right 2005    Mark Quintero     HERNIA REPAIR, INGUINAL RT/LT Right 2004     "Incarerated hernia with resection, Dr. Moraes     VASECTOMY  1988     Current Outpatient Medications   Medication Sig Dispense Refill     acetaminophen (TYLENOL) 500 MG tablet Take 500 mg by mouth       coenzyme Q-10 200 MG CAPS capsule Take  by mouth daily.       diltiazem ER COATED BEADS (CARDIZEM CD/CARTIA XT) 240 MG 24 hr capsule Take 1 capsule (240 mg) by mouth daily 30 capsule 0     ELIQUIS ANTICOAGULANT 2.5 MG tablet Take 5 mg by mouth daily       potassium chloride ER (K-TAB) 20 MEQ CR tablet Take 1 tablet (20 mEq) by mouth daily 90 tablet 2     triamterene-HCTZ (MAXZIDE) 75-50 MG tablet Take 1 tablet by mouth daily       UNABLE TO FIND MEDICATION NAME: focus clear         Allergies   Allergen Reactions     Penicillins Anaphylaxis and Unknown     Rash, shortness of breath, swelling     Amlodipine      Hytrin [Terazosin]      Lisinopril Cough     02/19/21 -- reviewed paper chart seeking info/reason lisinopril was stopped. Found note from to Dr. Wray from 9/2009 reporting stopped lisinopril due to cough. Had experimented going off a few times over some time and reported in note that every time he tries the lisinopril it causes a cough.      Losartan      Metoprolol      Made BP go higher     Chlorthalidone Rash, Blisters and Dermatitis        Social History     Tobacco Use     Smoking status: Never Smoker     Smokeless tobacco: Never Used   Substance Use Topics     Alcohol use: Yes     Alcohol/week: 0.0 - 1.0 standard drinks     Comment: ocassional     Family History   Problem Relation Age of Onset     Hypertension Brother      Heart Disease Brother      Diabetes Brother      No Known Problems Son      No Known Problems Son      No Known Problems Daughter      History   Drug Use No         Objective     BP (!) 158/83   Pulse 51   Temp 97.5  F (36.4  C) (Temporal)   Resp 16   Ht 1.778 m (5' 10\")   Wt 90.3 kg (199 lb)   SpO2 97%   BMI 28.55 kg/m      Physical Exam    GENERAL APPEARANCE: healthy, " alert and no distress     EYES: EOMI,  PERRL     HENT: ear canals and TM's normal and nose and mouth without ulcers or lesions     NECK: no adenopathy, no asymmetry, masses, or scars and thyroid normal to palpation     RESP: lungs clear to auscultation - no rales, rhonchi or wheezes     CV: irregularly irregular rhythm     ABDOMEN:  soft, nontender, no HSM or masses and bowel sounds normal     MS: extremities normal- no gross deformities noted, no evidence of inflammation in joints, FROM in all extremities.     SKIN: no suspicious lesions or rashes     NEURO: Normal strength and tone, sensory exam grossly normal, mentation intact and speech normal     PSYCH: mentation appears normal. and affect normal/bright     LYMPHATICS: No cervical adenopathy    Recent Labs   Lab Test 06/21/22  0000 11/09/21  1143 07/21/21  0823 06/24/21  1918   HGB 13.3*  --   --  14.7     --   --  253   NA  --  143 143 140   POTASSIUM  --  3.6 3.3* 3.7   CR  --  1.47* 1.46* 1.70*        Diagnostics:  No labs were ordered during this visit.   No EKG this visit, completed in the last 90 days.    Revised Cardiac Risk Index (RCRI):  The patient has the following serious cardiovascular risks for perioperative complications:   - No serious cardiac risks = 0 points     RCRI Interpretation: 0 points: Class I (very low risk - 0.4% complication rate)           Signed Electronically by: El Wray MD  Copy of this evaluation report is provided to requesting physician.

## 2022-09-12 ENCOUNTER — OFFICE VISIT (OUTPATIENT)
Dept: FAMILY MEDICINE | Facility: CLINIC | Age: 67
End: 2022-09-12

## 2022-09-12 VITALS
HEART RATE: 51 BPM | RESPIRATION RATE: 16 BRPM | HEIGHT: 70 IN | OXYGEN SATURATION: 97 % | WEIGHT: 199 LBS | DIASTOLIC BLOOD PRESSURE: 83 MMHG | TEMPERATURE: 97.5 F | BODY MASS INDEX: 28.49 KG/M2 | SYSTOLIC BLOOD PRESSURE: 158 MMHG

## 2022-09-12 DIAGNOSIS — Z01.818 PREOP GENERAL PHYSICAL EXAM: Primary | ICD-10-CM

## 2022-09-12 DIAGNOSIS — G56.01 CARPAL TUNNEL SYNDROME OF RIGHT WRIST: ICD-10-CM

## 2022-09-12 DIAGNOSIS — M67.432 GANGLION CYST OF WRIST, LEFT: ICD-10-CM

## 2022-09-12 DIAGNOSIS — G47.33 OSA (OBSTRUCTIVE SLEEP APNEA): ICD-10-CM

## 2022-09-12 DIAGNOSIS — I48.0 PAROXYSMAL ATRIAL FIBRILLATION (H): ICD-10-CM

## 2022-09-12 PROCEDURE — 99215 OFFICE O/P EST HI 40 MIN: CPT | Performed by: FAMILY MEDICINE

## 2022-09-13 NOTE — PROGRESS NOTES
9/12/22 faxed this preop and labs to [facility] @ [phone]    Carson Quiroz,   [facility]  [phone]

## 2022-09-15 NOTE — PROGRESS NOTES
9/14/22 faxed preop and EKG to [facility] @ [phone]    Carson Quiroz,   [facility]  [phone]

## 2022-10-03 ENCOUNTER — TRANSFERRED RECORDS (OUTPATIENT)
Dept: FAMILY MEDICINE | Facility: CLINIC | Age: 67
End: 2022-10-03

## 2022-10-23 ENCOUNTER — HEALTH MAINTENANCE LETTER (OUTPATIENT)
Age: 67
End: 2022-10-23

## 2022-11-16 ENCOUNTER — ANCILLARY PROCEDURE (OUTPATIENT)
Dept: ULTRASOUND IMAGING | Facility: CLINIC | Age: 67
End: 2022-11-16
Payer: COMMERCIAL

## 2022-11-16 ENCOUNTER — OFFICE VISIT (OUTPATIENT)
Dept: VASCULAR SURGERY | Facility: CLINIC | Age: 67
End: 2022-11-16
Payer: COMMERCIAL

## 2022-11-16 DIAGNOSIS — Z98.890 STATUS POST ENDOVENOUS RADIOFREQUENCY ABLATION (RFA) OF SAPHENOUS VEIN: Primary | ICD-10-CM

## 2022-11-16 DIAGNOSIS — I83.812 VARICOSE VEINS OF LEG WITH PAIN, LEFT: ICD-10-CM

## 2022-11-16 PROCEDURE — 93971 EXTREMITY STUDY: CPT | Mod: LT | Performed by: SURGERY

## 2022-11-16 PROCEDURE — 99213 OFFICE O/P EST LOW 20 MIN: CPT | Performed by: SURGERY

## 2022-11-16 NOTE — PROGRESS NOTES
Abdiaziz Kimball is 9 months status post radiofrequency ablation of his left greater saphenous vein with 1 unit of cosmetic stab avulsion phlebectomies.  He presents today for routine postprocedural ultrasound follow-up.  At his prior visit he noted moderate paresthesias on the medial aspect of the mid and distal left calf.  These areas have significantly improved and are not bothering him.  Most notably, he no longer complains of left ankle edema at the end of the workday.  Overall he is very pleased with his results.    Exam:  His left leg looks very good with no missed varicosities.    Ultrasound today shows the left greater saphenous vein is appropriately ablated.    ASSESSMENT:  9 months status post radiofrequency ablation of the left greater saphenous vein with 1 unit of stab phlebectomies clinically doing well.  Mild distal medial left calf paresthesias likely related to the prior phlebectomies which have significantly improved.    RECOMMENDATION:  I reviewed all the above with Varun.  We discussed the natural history of paresthesias and he does note significant improvement.  I reviewed today's ultrasound.  I have no venous concerns.  Follow-up will be with me on an as-needed basis.    Total length of this encounter was 20 minutes.    Macho Greenwood MD

## 2022-11-16 NOTE — LETTER
11/16/2022         RE: Abdiaziz Kimball  [address] [address]        Dear Colleague,    Thank you for referring your patient, Abdiaziz Kimball, to the [facility] [place]. Please see a copy of my visit note below.    Abdiaziz Kimball is 9 months status post radiofrequency ablation of his left greater saphenous vein with 1 unit of cosmetic stab avulsion phlebectomies.  He presents today for routine postprocedural ultrasound follow-up.  At his prior visit he noted moderate paresthesias on the medial aspect of the mid and distal left calf.  These areas have significantly improved and are not bothering him.  Most notably, he no longer complains of left ankle edema at the end of the workday.  Overall he is very pleased with his results.    Exam:  His left leg looks very good with no missed varicosities.    Ultrasound today shows the left greater saphenous vein is appropriately ablated.    ASSESSMENT:  9 months status post radiofrequency ablation of the left greater saphenous vein with 1 unit of stab phlebectomies clinically doing well.  Mild distal medial left calf paresthesias likely related to the prior phlebectomies which have significantly improved.    RECOMMENDATION:  I reviewed all the above with Varun.  We discussed the natural history of paresthesias and he does note significant improvement.  I reviewed today's ultrasound.  I have no venous concerns.  Follow-up will be with me on an as-needed basis.    Total length of this encounter was 20 minutes.    Macho Greenwood MD        Again, thank you for allowing me to participate in the care of your patient.        Sincerely,        Adama Greenwood MD

## 2022-12-03 DIAGNOSIS — E87.6 HYPOKALEMIA: ICD-10-CM

## 2022-12-05 RX ORDER — POTASSIUM CHLORIDE 1500 MG/1
TABLET, EXTENDED RELEASE ORAL
Qty: 90 TABLET | Refills: 0 | Status: SHIPPED | OUTPATIENT
Start: 2022-12-05 | End: 2023-05-16

## 2023-04-03 ENCOUNTER — TRANSFERRED RECORDS (OUTPATIENT)
Dept: FAMILY MEDICINE | Facility: CLINIC | Age: 68
End: 2023-04-03

## 2023-05-16 DIAGNOSIS — E87.6 HYPOKALEMIA: ICD-10-CM

## 2023-05-16 RX ORDER — POTASSIUM CHLORIDE 1500 MG/1
TABLET, EXTENDED RELEASE ORAL
Qty: 90 TABLET | Refills: 0 | Status: SHIPPED | OUTPATIENT
Start: 2023-05-16 | End: 2023-08-02

## 2023-05-16 NOTE — TELEPHONE ENCOUNTER
Potassium  LOV 9/12/22  Last labs 6/21/22  BP Readings from Last 3 Encounters:   09/12/22 (!) 158/83   06/21/22 134/76   03/21/22 138/78

## 2023-06-13 ENCOUNTER — TRANSFERRED RECORDS (OUTPATIENT)
Dept: FAMILY MEDICINE | Facility: CLINIC | Age: 68
End: 2023-06-13

## 2023-06-29 ENCOUNTER — OFFICE VISIT (OUTPATIENT)
Dept: FAMILY MEDICINE | Facility: CLINIC | Age: 68
End: 2023-06-29

## 2023-06-29 VITALS
DIASTOLIC BLOOD PRESSURE: 87 MMHG | OXYGEN SATURATION: 97 % | HEIGHT: 70 IN | SYSTOLIC BLOOD PRESSURE: 135 MMHG | BODY MASS INDEX: 28.63 KG/M2 | WEIGHT: 200 LBS | HEART RATE: 48 BPM

## 2023-06-29 DIAGNOSIS — N40.1 BENIGN PROSTATIC HYPERPLASIA WITH NOCTURIA: ICD-10-CM

## 2023-06-29 DIAGNOSIS — R35.1 BENIGN PROSTATIC HYPERPLASIA WITH NOCTURIA: ICD-10-CM

## 2023-06-29 DIAGNOSIS — Z13.6 SCREENING FOR HEART DISEASE: ICD-10-CM

## 2023-06-29 DIAGNOSIS — Z12.5 SCREENING FOR PROSTATE CANCER: ICD-10-CM

## 2023-06-29 DIAGNOSIS — I10 ESSENTIAL HYPERTENSION: ICD-10-CM

## 2023-06-29 DIAGNOSIS — R06.02 SHORTNESS OF BREATH: Primary | ICD-10-CM

## 2023-06-29 DIAGNOSIS — I48.0 PAROXYSMAL ATRIAL FIBRILLATION (H): ICD-10-CM

## 2023-06-29 LAB
% GRANULOCYTES: 67.1 % (ref 42.2–75.2)
FEF 25/75: NORMAL
FEV-1: NORMAL
FEV1/FVC: NORMAL
FVC: NORMAL
HCT VFR BLD AUTO: 41.3 % (ref 39–51)
HEMOGLOBIN: 14 G/DL (ref 13.4–17.5)
LYMPHOCYTES NFR BLD AUTO: 24.7 % (ref 20.5–51.1)
MCH RBC QN AUTO: 29.8 PG (ref 27–31)
MCHC RBC AUTO-ENTMCNC: 33.9 G/DL (ref 33–37)
MCV RBC AUTO: 87.9 FL (ref 80–100)
MONOCYTES NFR BLD AUTO: 8.2 % (ref 1.7–9.3)
PLATELET # BLD AUTO: 228 K/UL (ref 140–450)
RBC # BLD AUTO: 4.69 X10/CMM (ref 4.2–5.9)
WBC # BLD AUTO: 5.1 X10/CMM (ref 3.8–11)

## 2023-06-29 PROCEDURE — 99214 OFFICE O/P EST MOD 30 MIN: CPT | Mod: 25 | Performed by: FAMILY MEDICINE

## 2023-06-29 PROCEDURE — 36415 COLL VENOUS BLD VENIPUNCTURE: CPT | Performed by: FAMILY MEDICINE

## 2023-06-29 PROCEDURE — 94010 BREATHING CAPACITY TEST: CPT | Performed by: FAMILY MEDICINE

## 2023-06-29 PROCEDURE — 85025 COMPLETE CBC W/AUTO DIFF WBC: CPT | Performed by: FAMILY MEDICINE

## 2023-06-29 ASSESSMENT — ANXIETY QUESTIONNAIRES
6. BECOMING EASILY ANNOYED OR IRRITABLE: NOT AT ALL
7. FEELING AFRAID AS IF SOMETHING AWFUL MIGHT HAPPEN: SEVERAL DAYS
IF YOU CHECKED OFF ANY PROBLEMS ON THIS QUESTIONNAIRE, HOW DIFFICULT HAVE THESE PROBLEMS MADE IT FOR YOU TO DO YOUR WORK, TAKE CARE OF THINGS AT HOME, OR GET ALONG WITH OTHER PEOPLE: NOT DIFFICULT AT ALL
5. BEING SO RESTLESS THAT IT IS HARD TO SIT STILL: NOT AT ALL
2. NOT BEING ABLE TO STOP OR CONTROL WORRYING: NOT AT ALL
GAD7 TOTAL SCORE: 1
3. WORRYING TOO MUCH ABOUT DIFFERENT THINGS: NOT AT ALL
1. FEELING NERVOUS, ANXIOUS, OR ON EDGE: NOT AT ALL
GAD7 TOTAL SCORE: 1

## 2023-06-29 ASSESSMENT — PATIENT HEALTH QUESTIONNAIRE - PHQ9
SUM OF ALL RESPONSES TO PHQ QUESTIONS 1-9: 4
5. POOR APPETITE OR OVEREATING: NOT AT ALL

## 2023-06-29 NOTE — PROGRESS NOTES
Shoulders and feet, and knees are not getting worse.  SOB at night, doesn't last long.  Like the exhale isn't complete....  Pushing out the air helps.  More congestion.  Problem(s) Oriented visit      ROS:  General and Resp. completed and negative except as noted above     HISTORY:   reports current alcohol use.   reports that he has never smoked. He has never used smokeless tobacco.    Past Medical History:   Diagnosis Date     Benign neoplasm of ascending colon 9/29/2021     Benign neoplasm of cecum 9/29/2021     Dry skin 12/4/2014     HTN, goal below 140/90      Hyperlipidaemia LDL goal < 100      Hypokalemia 11/17/2011     Renal cyst 01/25/2008     Past Surgical History:   Procedure Laterality Date     ARTHROSCOPY KNEE RT/LT Left 1990's     ARTHROSCOPY KNEE RT/LT Left      ARTHROSCOPY KNEE RT/LT Left     Mark Quintero     ARTHROSCOPY KNEE RT/LT Left 2003ish    Mark Quintero     ARTHROSCOPY KNEE RT/LT Right 2005    Mark Quintero     HERNIA REPAIR, INGUINAL RT/LT Right 2004    Incarerated hernia with resection, Dr. Moraes     VASECTOMY  1988       EXAM:  BP: 135/87   Pulse: 48    Temp: Data Unavailable    Wt Readings from Last 2 Encounters:   06/29/23 90.7 kg (200 lb)   09/12/22 90.3 kg (199 lb)       BMI= Body mass index is 28.7 kg/m .    EXAM:  APPEARANCE: = Relaxed and in no distress  Oropharynx = No leukoplakia, No injection to the tissues, Normal Uvula  Thyroid = Not enlarged and no masses felt  Resp effort = Calm regular breathing  Breath Sounds = Good air movement with no rales or rhonchi in any lung fields      Assessment/Plan:  Abdiaziz was seen today for breathing problem, mood changes and recheck.    Diagnoses and all orders for this visit:    Shortness of breath  -     Spirometry, Breathing Capacity  -     CBC with Diff/Plt ([facility])  -     TSH ([facility])    Paroxysmal atrial fibrillation (H)  -     TSH ([facility])    Benign prostatic hyperplasia with nocturia    Essential hypertension  -     Comp.  "Metabolic Panel (14) ([facility])    Screening for heart disease  -     Lipid Panel ([facility])    Screening for prostate cancer  -     PSA Serum ([facility])    having symptoms that are potenitally explained by tachycardia await the ziopatch results.    COUNSELING:   reports that he has never smoked. He has never used smokeless tobacco.    Estimated body mass index is 28.7 kg/m  as calculated from the following:    Height as of this encounter: 1.778 m (5' 10\").    Weight as of this encounter: 90.7 kg (200 lb).       Appropriate preventive services were discussed with this patient, including applicable screening as appropriate for cardiovascular disease, diabetes, osteopenia/osteoporosis, and glaucoma.  As appropriate for age/gender, discussed screening for colorectal cancer, prostate cancer, breast cancer, and cervical cancer. Checklist reviewing preventive services available has been given to the patient.    Reviewed patients plan of care and provided an AVS. The Basic Care Plan (routine screening as documented in Health Maintenance) for Abdiaziz meets the Care Plan requirement. This Care Plan has been established and reviewed with the  Patient.      The following health maintenance items are reviewed in Epic and correct as of today:  Health Maintenance   Topic Date Due     ZOSTER IMMUNIZATION (1 of 2) Never done     COVID-19 Vaccine (3 - Moderna series) 05/25/2021     MEDICARE ANNUAL WELLNESS VISIT  07/06/2021     FALL RISK ASSESSMENT  11/09/2022     INFLUENZA VACCINE (Season Ended) 09/01/2023     ADVANCE CARE PLANNING  07/06/2025     DTAP/TDAP/TD IMMUNIZATION (2 - Td or Tdap) 12/31/2025     LIPID  11/09/2026     COLORECTAL CANCER SCREENING  09/27/2031     HEPATITIS C SCREENING  Completed     PHQ-2 (once per calendar year)  Completed     AORTIC ANEURYSM SCREENING (SYSTEM ASSIGNED)  Completed     IPV IMMUNIZATION  Aged Out     MENINGITIS IMMUNIZATION  Aged Out     Pneumococcal Vaccine: 65+ Years  Discontinued       El " Rory Wray  [facility]  For any issues my office # is [phone]

## 2023-06-30 LAB
ALBUMIN SERPL-MCNC: 4.5 G/DL (ref 3.8–4.8)
ALBUMIN/GLOB SERPL: 2 {RATIO} (ref 1.2–2.2)
ALP SERPL-CCNC: 97 IU/L (ref 44–121)
ALT SERPL-CCNC: 15 IU/L (ref 0–44)
AST SERPL-CCNC: 26 IU/L (ref 0–40)
BILIRUB SERPL-MCNC: 0.7 MG/DL (ref 0–1.2)
BUN SERPL-MCNC: 30 MG/DL (ref 8–27)
BUN/CREATININE RATIO: 26 (ref 10–24)
CALCIUM SERPL-MCNC: 8.8 MG/DL (ref 8.6–10.2)
CHLORIDE SERPLBLD-SCNC: 103 MMOL/L (ref 96–106)
CHOLEST SERPL-MCNC: 175 MG/DL (ref 100–199)
CREAT SERPL-MCNC: 1.16 MG/DL (ref 0.76–1.27)
EGFR: 68 ML/MIN/1.73
GLOBULIN, TOTAL: 2.3 G/DL (ref 1.5–4.5)
GLUCOSE SERPL-MCNC: 94 MG/DL (ref 70–99)
HDLC SERPL-MCNC: 38 MG/DL
LDL/HDL RATIO: 3 RATIO (ref 0–3.6)
LDLC SERPL CALC-MCNC: 113 MG/DL (ref 0–99)
POTASSIUM SERPL-SCNC: 3.2 MMOL/L (ref 3.5–5.2)
PROT SERPL-MCNC: 6.8 G/DL (ref 6–8.5)
PSA NG/ML: 1.7 NG/ML (ref 0–4)
SODIUM SERPL-SCNC: 142 MMOL/L (ref 134–144)
TOTAL CO2: 22 MMOL/L (ref 20–29)
TRIGL SERPL-MCNC: 135 MG/DL (ref 0–149)
TSH BLD-ACNC: 2.03 UIU/ML (ref 0.45–4.5)
VLDLC SERPL CALC-MCNC: 24 MG/DL (ref 5–40)

## 2023-06-30 NOTE — RESULT ENCOUNTER NOTE
Dear Abdiaziz,     I am writing to report that your included test results are as expected.    Many labs contain some results that are slightly outside of the normal range, I have reviewed any of these results and they require no changes at this time.    El Wray MD

## 2023-08-02 ENCOUNTER — OFFICE VISIT (OUTPATIENT)
Dept: FAMILY MEDICINE | Facility: CLINIC | Age: 68
End: 2023-08-02

## 2023-08-02 VITALS
WEIGHT: 202.4 LBS | DIASTOLIC BLOOD PRESSURE: 85 MMHG | HEART RATE: 62 BPM | BODY MASS INDEX: 29.04 KG/M2 | SYSTOLIC BLOOD PRESSURE: 130 MMHG | OXYGEN SATURATION: 95 %

## 2023-08-02 DIAGNOSIS — Z79.899 ENCOUNTER FOR LONG-TERM (CURRENT) USE OF HIGH-RISK MEDICATION: ICD-10-CM

## 2023-08-02 DIAGNOSIS — I48.0 PAROXYSMAL ATRIAL FIBRILLATION (H): ICD-10-CM

## 2023-08-02 DIAGNOSIS — R26.89 BALANCE PROBLEMS: ICD-10-CM

## 2023-08-02 DIAGNOSIS — I69.919 COGNITIVE DEFICITS AS LATE EFFECT OF CEREBROVASCULAR DISEASE: Primary | ICD-10-CM

## 2023-08-02 DIAGNOSIS — E87.6 HYPOKALEMIA: ICD-10-CM

## 2023-08-02 LAB
ANION GAP SERPL CALCULATED.3IONS-SCNC: 12 MMOL/L (ref 7–15)
BUN SERPL-MCNC: 21.3 MG/DL (ref 8–23)
CALCIUM SERPL-MCNC: 8.9 MG/DL (ref 8.8–10.2)
CHLORIDE SERPL-SCNC: 105 MMOL/L (ref 98–107)
CREAT SERPL-MCNC: 1.19 MG/DL (ref 0.67–1.17)
DEPRECATED HCO3 PLAS-SCNC: 24 MMOL/L (ref 22–29)
GFR SERPL CREATININE-BSD FRML MDRD: 66 ML/MIN/1.73M2
GLUCOSE SERPL-MCNC: 94 MG/DL (ref 70–99)
POTASSIUM SERPL-SCNC: 3.3 MMOL/L (ref 3.4–5.3)
SODIUM SERPL-SCNC: 141 MMOL/L (ref 136–145)

## 2023-08-02 PROCEDURE — 80048 BASIC METABOLIC PNL TOTAL CA: CPT | Performed by: FAMILY MEDICINE

## 2023-08-02 PROCEDURE — 99214 OFFICE O/P EST MOD 30 MIN: CPT | Performed by: FAMILY MEDICINE

## 2023-08-02 PROCEDURE — 36415 COLL VENOUS BLD VENIPUNCTURE: CPT | Performed by: FAMILY MEDICINE

## 2023-08-02 RX ORDER — TRIAMTERENE AND HYDROCHLOROTHIAZIDE 75; 50 MG/1; MG/1
1 TABLET ORAL DAILY
Qty: 90 TABLET | Refills: 0 | Status: SHIPPED | OUTPATIENT
Start: 2023-08-02 | End: 2024-04-10

## 2023-08-02 RX ORDER — DILTIAZEM HYDROCHLORIDE 240 MG/1
240 CAPSULE, COATED, EXTENDED RELEASE ORAL DAILY
Qty: 90 CAPSULE | Refills: 0 | Status: SHIPPED | OUTPATIENT
Start: 2023-08-02

## 2023-08-02 RX ORDER — POTASSIUM CHLORIDE 1500 MG/1
20 TABLET, EXTENDED RELEASE ORAL 2 TIMES DAILY
Qty: 180 TABLET | Refills: 0 | Status: SHIPPED | OUTPATIENT
Start: 2023-08-02 | End: 2023-11-06

## 2023-08-02 RX ORDER — APIXABAN 2.5 MG/1
5 TABLET, FILM COATED ORAL DAILY
Qty: 90 TABLET | Refills: 0 | Status: SHIPPED | OUTPATIENT
Start: 2023-08-02 | End: 2024-09-18

## 2023-08-02 NOTE — PROGRESS NOTES
Assessment & Plan     Hypokalemia  Due for recheck will increase to BID  - potassium chloride ER (K-TAB) 20 MEQ CR tablet  Dispense: 180 tablet; Refill: 0    Cognitive deficits as late effect of cerebrovascular disease  And Balance problems      Paroxysmal atrial fibrillation (H)  Doing well no bleeding  - ELIQUIS ANTICOAGULANT 2.5 MG tablet  Dispense: 90 tablet; Refill: 0    HTN    - diltiazem ER COATED BEADS (CARDIZEM CD/CARTIA XT) 240 MG 24 hr capsule  Dispense: 90 capsule; Refill: 0  - triamterene-HCTZ (MAXZIDE) 75-50 MG tablet  Dispense: 90 tablet; Refill: 0               Regular exercise    No follow-ups on file.    El Wray MD  [facility]    Hilda Bond is a 67 year old, presenting for the following health issues:  RECHECK (Follow up from cardiology, lab work , EKG stress test ultrasound all results were negative )    HPI   Feet are sensitive and back hurts  Shoulders ache  Saw cardiology  Balance is bad  Itchy all the time  Has age spots all over the place  Feels his cognition is decreased. Goes downstairs and can't remember task he intended to complete.  Weight will change without diet changes.  Review of Systems   Constitutional, HEENT, cardiovascular, pulmonary, GI, , musculoskeletal, neuro, skin, endocrine and psych systems are negative, except as otherwise noted.      Objective    /85   Pulse 62   Wt 91.8 kg (202 lb 6.4 oz)   SpO2 95%   BMI 29.04 kg/m    Body mass index is 29.04 kg/m .  Physical Exam   GENERAL: healthy, alert and no distress  NECK: no adenopathy, no asymmetry, masses, or scars and thyroid normal to palpation  RESP: lungs clear to auscultation - no rales, rhonchi or wheezes  CV: regular rate and rhythm, normal S1 S2, no S3 or S4, no murmur, click or rub, no peripheral edema and peripheral pulses strong  ABDOMEN: soft, nontender, no hepatosplenomegaly, no masses and bowel sounds normal  MS: no gross musculoskeletal defects noted, no edema

## 2023-08-02 NOTE — RESULT ENCOUNTER NOTE
Dear Abdiaziz,     As expected your potassium is still low.  Lets increase to twice a day.    El Wray MD

## 2023-08-18 ENCOUNTER — TRANSFERRED RECORDS (OUTPATIENT)
Dept: FAMILY MEDICINE | Facility: CLINIC | Age: 68
End: 2023-08-18

## 2023-09-14 ENCOUNTER — TELEPHONE (OUTPATIENT)
Dept: FAMILY MEDICINE | Facility: CLINIC | Age: 68
End: 2023-09-14

## 2023-09-14 DIAGNOSIS — J34.89 SINUS MUCOSAL THICKENING: Primary | ICD-10-CM

## 2023-09-14 NOTE — TELEPHONE ENCOUNTER
----- Message from El Wray MD sent at 8/21/2023  9:27 AM CDT -----  Please let him know he has some brain atrophy, and some sinus findings that would be good to see an ENT for and help him set up?  BARBARA RIOJAS

## 2023-09-19 NOTE — TELEPHONE ENCOUNTER
Called and spoke with patient regarding MRI results per Dr Wray. Referral entered to ENT. Yamilka Rios

## 2023-10-16 ENCOUNTER — TRANSFERRED RECORDS (OUTPATIENT)
Dept: FAMILY MEDICINE | Facility: CLINIC | Age: 68
End: 2023-10-16

## 2023-11-05 ENCOUNTER — HEALTH MAINTENANCE LETTER (OUTPATIENT)
Age: 68
End: 2023-11-05

## 2023-11-05 DIAGNOSIS — E87.6 HYPOKALEMIA: ICD-10-CM

## 2023-11-06 RX ORDER — POTASSIUM CHLORIDE 1500 MG/1
20 TABLET, EXTENDED RELEASE ORAL 2 TIMES DAILY
Qty: 180 TABLET | Refills: 0 | Status: SHIPPED | OUTPATIENT
Start: 2023-11-06 | End: 2024-02-06

## 2023-12-06 ENCOUNTER — TRANSFERRED RECORDS (OUTPATIENT)
Dept: FAMILY MEDICINE | Facility: CLINIC | Age: 68
End: 2023-12-06

## 2023-12-14 ENCOUNTER — TRANSFERRED RECORDS (OUTPATIENT)
Dept: FAMILY MEDICINE | Facility: CLINIC | Age: 68
End: 2023-12-14
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
## 2024-01-12 NOTE — PATIENT INSTRUCTIONS
Preparing for Your Surgery  Getting started  A nurse will call you to review your health history and instructions. They will give you an arrival time based on your scheduled surgery time. Please be ready to share:  Your doctor's clinic name and phone number  Your medical, surgical, and anesthesia history  A list of allergies and sensitivities  A list of medicines, including herbal treatments and over-the-counter drugs  Whether the patient has a legal guardian (ask how to send us the papers in advance)  Please tell us if you're pregnant--or if there's any chance you might be pregnant. Some surgeries may injure a fetus (unborn baby), so they require a pregnancy test. Surgeries that are safe for a fetus don't always need a test, and you can choose whether to have one.   If you have a child who's having surgery, please ask for a copy of Preparing for Your Child's Surgery.    Preparing for surgery  Within 10 to 30 days of surgery: Have a pre-op exam (sometimes called an H&P, or History and Physical). This can be done at a clinic or pre-operative center.  If you're having a , you may not need this exam. Talk to your care team.  At your pre-op exam, talk to your care team about all medicines you take. If you need to stop any medicines before surgery, ask when to start taking them again.  We do this for your safety. Many medicines can make you bleed too much during surgery. Some change how well surgery (anesthesia) drugs work.  Call your insurance company to let them know you're having surgery. (If you don't have insurance, call [phone].)  Call your clinic if there's any change in your health. This includes signs of a cold or flu (sore throat, runny nose, cough, rash, fever). It also includes a scrape or scratch near the surgery site.  If you have questions on the day of surgery, call your hospital or surgery center.  Eating and drinking guidelines  For your safety: Unless your surgeon tells you otherwise,  follow the guidelines below.  Eat and drink as usual until 8 hours before you arrive for surgery. After that, no food or milk.  Drink clear liquids until 2 hours before you arrive. These are liquids you can see through, like water, Gatorade, and Propel Water. They also include plain black coffee and tea (no cream or milk), candy, and breath mints. You can spit out gum when you arrive.  If you drink alcohol: Stop drinking it the night before surgery.  If your care team tells you to take medicine on the morning of surgery, it's okay to take it with a sip of water.  Preventing infection  Shower or bathe the night before and morning of your surgery. Follow the instructions your clinic gave you. (If no instructions, use regular soap.)  Don't shave or clip hair near your surgery site. We'll remove the hair if needed.  Don't smoke or vape the morning of surgery. You may chew nicotine gum up to 2 hours before surgery. A nicotine patch is okay.  Note: Some surgeries require you to completely quit smoking and nicotine. Check with your surgeon.  Your care team will make every effort to keep you safe from infection. We will:  Clean our hands often with soap and water (or an alcohol-based hand rub).  Clean the skin at your surgery site with a special soap that kills germs.  Give you a special gown to keep you warm. (Cold raises the risk of infection.)  Wear special hair covers, masks, gowns and gloves during surgery.  Give antibiotic medicine, if prescribed. Not all surgeries need antibiotics.  What to bring on the day of surgery  Photo ID and insurance card  Copy of your health care directive, if you have one  Glasses and hearing aids (bring cases)  You can't wear contacts during surgery  Inhaler and eye drops, if you use them (tell us about these when you arrive)  CPAP machine or breathing device, if you use them  A few personal items, if spending the night  If you have . . .  A pacemaker, ICD (cardiac defibrillator) or other  implant: Bring the ID card.  An implanted stimulator: Bring the remote control.  A legal guardian: Bring a copy of the certified (court-stamped) guardianship papers.  Please remove any jewelry, including body piercings. Leave jewelry and other valuables at home.  If you're going home the day of surgery  You must have a responsible adult drive you home. They should stay with you overnight as well.  If you don't have someone to stay with you, and you aren't safe to go home alone, we may keep you overnight. Insurance often won't pay for this.  After surgery  If it's hard to control your pain or you need more pain medicine, please call your surgeon's office.  Questions?   If you have any questions for your care team, list them here: _________________________________________________________________________________________________________________________________________________________________________ ____________________________________ ____________________________________ ____________________________________  For informational purposes only. Not to replace the advice of your health care provider. Copyright   2003, 2019 [employer] [facility]. All rights reserved. Clinically reviewed by Kayli Gonzalez MD. [employer] 722245 - REV 12/22.    How to Take Your Medication Before Surgery  - Take all of your medications before surgery as usual

## 2024-01-17 ENCOUNTER — OFFICE VISIT (OUTPATIENT)
Dept: FAMILY MEDICINE | Facility: CLINIC | Age: 69
End: 2024-01-17

## 2024-01-17 VITALS
BODY MASS INDEX: 29.63 KG/M2 | OXYGEN SATURATION: 96 % | HEART RATE: 59 BPM | HEIGHT: 70 IN | SYSTOLIC BLOOD PRESSURE: 145 MMHG | WEIGHT: 207 LBS | DIASTOLIC BLOOD PRESSURE: 91 MMHG

## 2024-01-17 DIAGNOSIS — R35.1 BENIGN PROSTATIC HYPERPLASIA WITH NOCTURIA: ICD-10-CM

## 2024-01-17 DIAGNOSIS — Z01.818 PREOP GENERAL PHYSICAL EXAM: Primary | ICD-10-CM

## 2024-01-17 DIAGNOSIS — N40.1 BENIGN PROSTATIC HYPERPLASIA WITH NOCTURIA: ICD-10-CM

## 2024-01-17 DIAGNOSIS — G56.01 CARPAL TUNNEL SYNDROME OF RIGHT WRIST: ICD-10-CM

## 2024-01-17 DIAGNOSIS — H02.36 REDUNDANT EYELID OF BOTH EYES: ICD-10-CM

## 2024-01-17 DIAGNOSIS — I10 ESSENTIAL HYPERTENSION: ICD-10-CM

## 2024-01-17 DIAGNOSIS — I48.0 PAROXYSMAL ATRIAL FIBRILLATION (H): ICD-10-CM

## 2024-01-17 DIAGNOSIS — D68.69 SECONDARY HYPERCOAGULABLE STATE (H): ICD-10-CM

## 2024-01-17 DIAGNOSIS — H02.33 REDUNDANT EYELID OF BOTH EYES: ICD-10-CM

## 2024-01-17 PROCEDURE — 99214 OFFICE O/P EST MOD 30 MIN: CPT | Performed by: FAMILY MEDICINE

## 2024-01-19 NOTE — PROGRESS NOTES
1/17/24 faxed this preop to [facility] @ [phone]    Carson Quiroz,   [facility]  [phone]

## 2024-02-06 DIAGNOSIS — E87.6 HYPOKALEMIA: ICD-10-CM

## 2024-02-06 RX ORDER — POTASSIUM CHLORIDE 1500 MG/1
20 TABLET, EXTENDED RELEASE ORAL 2 TIMES DAILY
Qty: 180 TABLET | Refills: 0 | Status: SHIPPED | OUTPATIENT
Start: 2024-02-06 | End: 2024-05-19

## 2024-02-15 ENCOUNTER — TRANSFERRED RECORDS (OUTPATIENT)
Dept: FAMILY MEDICINE | Facility: CLINIC | Age: 69
End: 2024-02-15

## 2024-04-10 ENCOUNTER — TRANSFERRED RECORDS (OUTPATIENT)
Dept: FAMILY MEDICINE | Facility: CLINIC | Age: 69
End: 2024-04-10

## 2024-04-10 DIAGNOSIS — Z79.899 ENCOUNTER FOR LONG-TERM (CURRENT) USE OF HIGH-RISK MEDICATION: ICD-10-CM

## 2024-04-10 RX ORDER — TRIAMTERENE AND HYDROCHLOROTHIAZIDE 75; 50 MG/1; MG/1
1 TABLET ORAL DAILY
Qty: 90 TABLET | Refills: 0 | Status: SHIPPED | OUTPATIENT
Start: 2024-04-10 | End: 2024-07-05

## 2024-05-16 ENCOUNTER — TRANSFERRED RECORDS (OUTPATIENT)
Dept: FAMILY MEDICINE | Facility: CLINIC | Age: 69
End: 2024-05-16

## 2024-05-18 DIAGNOSIS — E87.6 HYPOKALEMIA: ICD-10-CM

## 2024-05-19 RX ORDER — POTASSIUM CHLORIDE 1500 MG/1
20 TABLET, EXTENDED RELEASE ORAL 2 TIMES DAILY
Qty: 180 TABLET | Refills: 0 | Status: SHIPPED | OUTPATIENT
Start: 2024-05-19 | End: 2024-08-13

## 2024-07-05 DIAGNOSIS — Z79.899 ENCOUNTER FOR LONG-TERM (CURRENT) USE OF HIGH-RISK MEDICATION: ICD-10-CM

## 2024-07-05 RX ORDER — TRIAMTERENE AND HYDROCHLOROTHIAZIDE 75; 50 MG/1; MG/1
1 TABLET ORAL DAILY
Qty: 90 TABLET | Refills: 1 | Status: SHIPPED | OUTPATIENT
Start: 2024-07-05

## 2024-07-05 NOTE — CONFIDENTIAL NOTE
Med: TRIAMTERENE-HCTZ    LOV (related): 1/17/24 - PREOP      Due for F/U around: N/A    Next Appt: NONE

## 2024-08-13 DIAGNOSIS — E87.6 HYPOKALEMIA: ICD-10-CM

## 2024-08-13 NOTE — TELEPHONE ENCOUNTER
Med: potassium     LOV (related): 1/17/24-preop    Last Lab: 8/2/23      Due for F/U around: due for CPX      Next Appt: No current future appointments scheduled         BP Readings from Last 3 Encounters:   01/17/24 (!) 145/91   08/02/23 130/85   06/29/23 135/87       Last Comprehensive Metabolic Panel:  Lab Results   Component Value Date     08/02/2023    POTASSIUM 3.3 (L) 08/02/2023    CHLORIDE 105 08/02/2023    CO2 24 08/02/2023    ANIONGAP 12 08/02/2023    GLC 94 08/02/2023    BUN 21.3 08/02/2023    CR 1.19 (H) 08/02/2023    GFRESTIMATED 66 08/02/2023    TARAN 8.9 08/02/2023

## 2024-08-15 RX ORDER — POTASSIUM CHLORIDE 1500 MG/1
20 TABLET, EXTENDED RELEASE ORAL 2 TIMES DAILY
Qty: 180 TABLET | Refills: 0 | Status: SHIPPED | OUTPATIENT
Start: 2024-08-15

## 2024-09-18 DIAGNOSIS — I48.0 PAROXYSMAL ATRIAL FIBRILLATION (H): ICD-10-CM

## 2024-09-18 RX ORDER — APIXABAN 2.5 MG/1
5 TABLET, FILM COATED ORAL DAILY
Qty: 180 TABLET | Refills: 3 | Status: SHIPPED | OUTPATIENT
Start: 2024-09-18

## 2024-09-18 NOTE — TELEPHONE ENCOUNTER
Med: Eliquis       LOV (related): 1/17/24      Due for F/U around:  due for CPX      Next Appt: 10/23/24 (awv) with Nils

## 2024-10-21 NOTE — PATIENT INSTRUCTIONS
Our medicare program is known as [employer] [facility].    We have found an  to help explain medicare options that help make sure that you can continue to come to our clinic.    You can call them at [phone]      Patient Education   Preventive Care Advice   This is general advice given by our system to help you stay healthy. However, your care team may have specific advice just for you. Please talk to your care team about your preventive care needs.  Nutrition  Eat 5 or more servings of fruits and vegetables each day.  Try wheat bread, brown rice and whole grain pasta (instead of white bread, rice, and pasta).  Get enough calcium and vitamin D. Check the label on foods and aim for 100% of the RDA (recommended daily allowance).  Lifestyle  Exercise at least 150 minutes each week  (30 minutes a day, 5 days a week).  Do muscle strengthening activities 2 days a week. These help control your weight and prevent disease.  No smoking.  Wear sunscreen to prevent skin cancer.  Have a dental exam and cleaning every 6 months.  Yearly exams  See your health care team every year to talk about:  Any changes in your health.  Any medicines your care team has prescribed.  Preventive care, family planning, and ways to prevent chronic diseases.  Shots (vaccines)   HPV shots (up to age 26), if you've never had them before.  Hepatitis B shots (up to age 59), if you've never had them before.  COVID-19 shot: Get this shot when it's due.  Flu shot: Get a flu shot every year.  Tetanus shot: Get a tetanus shot every 10 years.  Pneumococcal, hepatitis A, and RSV shots: Ask your care team if you need these based on your risk.  Shingles shot (for age 50 and up)  General health tests  Diabetes screening:  Starting at age 35, Get screened for diabetes at least every 3 years.  If you are younger than age 35, ask your care team if you should be screened for diabetes.  Cholesterol test: At age 39, start having a cholesterol test  every 5 years, or more often if advised.  Bone density scan (DEXA): At age 50, ask your care team if you should have this scan for osteoporosis (brittle bones).  Hepatitis C: Get tested at least once in your life.  STIs (sexually transmitted infections)  Before age 24: Ask your care team if you should be screened for STIs.  After age 24: Get screened for STIs if you're at risk. You are at risk for STIs (including HIV) if:  You are sexually active with more than one person.  You don't use condoms every time.  You or a partner was diagnosed with a sexually transmitted infection.  If you are at risk for HIV, ask about PrEP medicine to prevent HIV.  Get tested for HIV at least once in your life, whether you are at risk for HIV or not.  Cancer screening tests  Cervical cancer screening: If you have a cervix, begin getting regular cervical cancer screening tests starting at age 21.  Breast cancer scan (mammogram): If you've ever had breasts, begin having regular mammograms starting at age 40. This is a scan to check for breast cancer.  Colon cancer screening: It is important to start screening for colon cancer at age 45.  Have a colonoscopy test every 10 years (or more often if you're at risk) Or, ask your provider about stool tests like a FIT test every year or Cologuard test every 3 years.  To learn more about your testing options, visit:   .  For help making a decision, visit:   https://bit.ly/fh04302.  Prostate cancer screening test: If you have a prostate, ask your care team if a prostate cancer screening test (PSA) at age 55 is right for you.  Lung cancer screening: If you are a current or former smoker ages 50 to 80, ask your care team if ongoing lung cancer screenings are right for you.  For informational purposes only. Not to replace the advice of your health care provider. Copyright   2023 [employer]. All rights reserved. Clinically reviewed by the [facility]. [employer]  [id number] - REV 01/24.

## 2024-10-23 ENCOUNTER — OFFICE VISIT (OUTPATIENT)
Dept: FAMILY MEDICINE | Facility: CLINIC | Age: 69
End: 2024-10-23

## 2024-10-23 VITALS
OXYGEN SATURATION: 97 % | HEART RATE: 53 BPM | DIASTOLIC BLOOD PRESSURE: 80 MMHG | WEIGHT: 203.4 LBS | HEIGHT: 70 IN | BODY MASS INDEX: 29.12 KG/M2 | SYSTOLIC BLOOD PRESSURE: 144 MMHG

## 2024-10-23 DIAGNOSIS — Z00.00 ROUTINE GENERAL MEDICAL EXAMINATION AT A HEALTH CARE FACILITY: Primary | ICD-10-CM

## 2024-10-23 DIAGNOSIS — D68.69 SECONDARY HYPERCOAGULABLE STATE (H): ICD-10-CM

## 2024-10-23 DIAGNOSIS — G47.33 OSA (OBSTRUCTIVE SLEEP APNEA): ICD-10-CM

## 2024-10-23 DIAGNOSIS — Z79.899 ENCOUNTER FOR LONG-TERM (CURRENT) USE OF HIGH-RISK MEDICATION: ICD-10-CM

## 2024-10-23 DIAGNOSIS — Z12.5 SCREENING FOR PROSTATE CANCER: ICD-10-CM

## 2024-10-23 DIAGNOSIS — R35.1 BENIGN PROSTATIC HYPERPLASIA WITH NOCTURIA: ICD-10-CM

## 2024-10-23 DIAGNOSIS — I48.0 PAROXYSMAL ATRIAL FIBRILLATION (H): ICD-10-CM

## 2024-10-23 DIAGNOSIS — E87.6 HYPOKALEMIA: ICD-10-CM

## 2024-10-23 DIAGNOSIS — I10 ESSENTIAL HYPERTENSION: ICD-10-CM

## 2024-10-23 DIAGNOSIS — Z12.11 SCREEN FOR COLON CANCER: ICD-10-CM

## 2024-10-23 DIAGNOSIS — N40.1 BENIGN PROSTATIC HYPERPLASIA WITH NOCTURIA: ICD-10-CM

## 2024-10-23 LAB
% GRANULOCYTES: 67 % (ref 42.2–75.2)
ALBUMIN SERPL BCG-MCNC: 4.2 G/DL (ref 3.5–5.2)
ALP SERPL-CCNC: 99 U/L (ref 40–150)
ALT SERPL W P-5'-P-CCNC: 13 U/L (ref 0–70)
ANION GAP SERPL CALCULATED.3IONS-SCNC: 9 MMOL/L (ref 7–15)
AST SERPL W P-5'-P-CCNC: 18 U/L (ref 0–45)
BILIRUB SERPL-MCNC: 0.9 MG/DL
BUN SERPL-MCNC: 24.1 MG/DL (ref 8–23)
CALCIUM SERPL-MCNC: 9.2 MG/DL (ref 8.8–10.4)
CHLORIDE SERPL-SCNC: 102 MMOL/L (ref 98–107)
CHOLESTEROL: 217 MG/DL (ref 100–199)
CREAT SERPL-MCNC: 1.22 MG/DL (ref 0.67–1.17)
EGFRCR SERPLBLD CKD-EPI 2021: 64 ML/MIN/1.73M2
FASTING STATUS PATIENT QL REPORTED: YES
FASTING?: YES
GLUCOSE SERPL-MCNC: 96 MG/DL (ref 70–99)
HCO3 SERPL-SCNC: 30 MMOL/L (ref 22–29)
HCT VFR BLD AUTO: 43.7 % (ref 39–51)
HDL (RMG): 23 MG/DL (ref 40–?)
HEMOGLOBIN: 15.3 G/DL (ref 13.4–17.5)
LDL CALCULATED (RMG): 153 MG/DL (ref 0–130)
LYMPHOCYTES NFR BLD AUTO: 25.3 % (ref 20.5–51.1)
MCH RBC QN AUTO: 30.7 PG (ref 27–31)
MCHC RBC AUTO-ENTMCNC: 35.1 G/DL (ref 33–37)
MCV RBC AUTO: 87.6 FL (ref 80–100)
MONOCYTES NFR BLD AUTO: 7.7 % (ref 1.7–9.3)
PLATELET # BLD AUTO: 228 K/UL (ref 140–450)
POTASSIUM SERPL-SCNC: 3.7 MMOL/L (ref 3.4–5.3)
PROT SERPL-MCNC: 7.3 G/DL (ref 6.4–8.3)
PSA SERPL DL<=0.01 NG/ML-MCNC: 1.47 NG/ML (ref 0–6.5)
RBC # BLD AUTO: 4.99 X10/CMM (ref 4.2–5.9)
SODIUM SERPL-SCNC: 141 MMOL/L (ref 135–145)
TRIGLYCERIDES (RMG): 210 MG/DL (ref 0–149)
WBC # BLD AUTO: 5.8 X10/CMM (ref 3.8–11)

## 2024-10-23 PROCEDURE — 85025 COMPLETE CBC W/AUTO DIFF WBC: CPT | Performed by: FAMILY MEDICINE

## 2024-10-23 PROCEDURE — 99213 OFFICE O/P EST LOW 20 MIN: CPT | Mod: 25 | Performed by: FAMILY MEDICINE

## 2024-10-23 PROCEDURE — 99397 PER PM REEVAL EST PAT 65+ YR: CPT | Performed by: FAMILY MEDICINE

## 2024-10-23 PROCEDURE — 80053 COMPREHEN METABOLIC PANEL: CPT | Performed by: FAMILY MEDICINE

## 2024-10-23 PROCEDURE — 36415 COLL VENOUS BLD VENIPUNCTURE: CPT | Performed by: FAMILY MEDICINE

## 2024-10-23 PROCEDURE — 80061 LIPID PANEL: CPT | Mod: QW | Performed by: FAMILY MEDICINE

## 2024-10-23 PROCEDURE — G0103 PSA SCREENING: HCPCS | Mod: 90 | Performed by: FAMILY MEDICINE

## 2024-10-23 PROCEDURE — G0103 PSA SCREENING: HCPCS | Performed by: FAMILY MEDICINE

## 2024-10-23 RX ORDER — DILTIAZEM HYDROCHLORIDE 240 MG/1
240 CAPSULE, COATED, EXTENDED RELEASE ORAL DAILY
Qty: 90 CAPSULE | Refills: 3 | Status: SHIPPED | OUTPATIENT
Start: 2024-10-23

## 2024-10-23 RX ORDER — APIXABAN 2.5 MG/1
5 TABLET, FILM COATED ORAL DAILY
Qty: 180 TABLET | Refills: 3 | Status: SHIPPED | OUTPATIENT
Start: 2024-10-23

## 2024-10-23 RX ORDER — TRIAMTERENE AND HYDROCHLOROTHIAZIDE 75; 50 MG/1; MG/1
1 TABLET ORAL DAILY
Qty: 90 TABLET | Refills: 1 | Status: SHIPPED | OUTPATIENT
Start: 2024-10-23 | End: 2024-10-23

## 2024-10-23 RX ORDER — TRIAMTERENE AND HYDROCHLOROTHIAZIDE 75; 50 MG/1; MG/1
1 TABLET ORAL DAILY
Qty: 90 TABLET | Refills: 3 | Status: SHIPPED | OUTPATIENT
Start: 2024-10-23

## 2024-10-23 RX ORDER — POTASSIUM CHLORIDE 1500 MG/1
20 TABLET, EXTENDED RELEASE ORAL 2 TIMES DAILY
Qty: 180 TABLET | Refills: 0 | Status: SHIPPED | OUTPATIENT
Start: 2024-10-23 | End: 2024-10-23

## 2024-10-23 RX ORDER — DILTIAZEM HYDROCHLORIDE 240 MG/1
240 CAPSULE, COATED, EXTENDED RELEASE ORAL DAILY
Qty: 90 CAPSULE | Refills: 0 | Status: SHIPPED | OUTPATIENT
Start: 2024-10-23 | End: 2024-10-23

## 2024-10-23 RX ORDER — POTASSIUM CHLORIDE 1500 MG/1
20 TABLET, EXTENDED RELEASE ORAL 2 TIMES DAILY
Qty: 180 TABLET | Refills: 3 | Status: SHIPPED | OUTPATIENT
Start: 2024-10-23

## 2024-10-23 SDOH — HEALTH STABILITY: PHYSICAL HEALTH: ON AVERAGE, HOW MANY MINUTES DO YOU ENGAGE IN EXERCISE AT THIS LEVEL?: 30 MIN

## 2024-10-23 SDOH — HEALTH STABILITY: PHYSICAL HEALTH: ON AVERAGE, HOW MANY DAYS PER WEEK DO YOU ENGAGE IN MODERATE TO STRENUOUS EXERCISE (LIKE A BRISK WALK)?: 5 DAYS

## 2024-10-23 ASSESSMENT — SOCIAL DETERMINANTS OF HEALTH (SDOH): HOW OFTEN DO YOU GET TOGETHER WITH FRIENDS OR RELATIVES?: TWICE A WEEK

## 2024-10-23 NOTE — PROGRESS NOTES
Preventive Care Visit  [facility]  El Wray MD, Family Medicine  Oct 23, 2024      Assessment & Plan     Routine general medical examination at a health care facility    - VENOUS COLLECTION    Hypokalemia  Check level and continue meds.  - potassium chloride ER (K-TAB) 20 MEQ CR tablet  Dispense: 180 tablet; Refill: 3    Paroxysmal atrial fibrillation (H)  Rate controlled and no bleeding from anticoagulant will continue meds.  - ELIQUIS ANTICOAGULANT 2.5 MG tablet  Dispense: 180 tablet; Refill: 3  - Lipid Profile ([facility])    Essential hypertension  At goal on current meds will continue  - Comprehensive metabolic panel  - CBC with Diff/Plt ([facility])  - potassium chloride ER (K-TAB) 20 MEQ CR tablet  Dispense: 180 tablet; Refill: 3    Screen for colon cancer  He will call to determine if 3 or 5 year follow up    Benign prostatic hyperplasia with nocturia  These symptoms sound most consistent with bladder irritation from an enlarged prostate (BPH).  Will make sure the PSA is normal and up to date.    Discussed treatment options for this including lifestyle changes ( i.e. restricting water intake close to bedtime or long trips, avoiding caffeine, etc.), medications (including FLOMAX, proscar, saw palmetto, etc), and referral to Urology.      Encounter for long-term (current) use of high-risk medication  - diltiazem ER COATED BEADS (CARDIZEM CD/CARTIA XT) 240 MG 24 hr capsule  Dispense: 90 capsule; Refill: 3  - triamterene-HCTZ (MAXZIDE) 75-50 MG tablet  Dispense: 90 tablet; Refill: 3    SUHA (obstructive sleep apnea)  On nasal mask and getting 8 hours.    Secondary hypercoagulable state (H)  [id number] Score: 2 points, which represents a 2.2% annual risk of major embolic event, without anti-coagulation or an LAAO device. {  Screening for prostate cancer  due  - Prostate Specific Antigen Screen      Patient has been advised of split billing requirements and indicates understanding:  "Yes        BMI  Estimated body mass index is 28.98 kg/m  as calculated from the following:    Height as of this encounter: 1.784 m (5' 10.25\").    Weight as of this encounter: 92.3 kg (203 lb 6.4 oz).       Counseling  Appropriate preventive services were addressed with this patient via screening, questionnaire, or discussion as appropriate for fall prevention, nutrition, physical activity, Tobacco-use cessation, social engagement, weight loss and cognition.  Checklist reviewing preventive services available has been given to the patient.  Reviewed patient's diet, addressing concerns and/or questions.   Discussed possible causes of fatigue.     Regular exercise    Return in about 53 weeks (around 10/29/2025) for Annual Wellness Visit.    Hilda Bond is a 68 year old, presenting for the following:  Physical (Fasting), Hearing Screening (Passed all), Back Pain (Sore & tight - ongoing), Balance/ Vestibular (Not very good - but no falls), Imm/Inj (COVID: Declined/FLU: Declined), and Health Maintenance (Colon Screen: 2021 - Due 2024 per report - pt states 5 years)    HPI  Here for check up and to follow up on the above        Health Care Directive  Patient does not have a Health Care Directive: Discussed advance care planning with patient; information given to patient to review.   10/23/2024   General Health   How would you rate your overall physical health? Good   Feel stress (tense, anxious, or unable to sleep) Not at all       10/23/2024   Nutrition   Diet: Low salt    Other   If other, please elaborate: intermittent fasting       Multiple values from one day are sorted in reverse-chronological order      10/23/2024   Exercise   Days per week of moderate/strenous exercise 5 days   Average minutes spent exercising at this level 30 min       10/23/2024   Social Factors   Frequency of gathering with friends or relatives Twice a week   Worry food won't last until get money to buy more No   Food not last or not have " enough money for food? No   Do you have housing? (Housing is defined as stable permanent housing and does not include staying ouside in a car, in a tent, in an abandoned building, in an overnight shelter, or couch-surfing.) Yes   Are you worried about losing your housing? No   Lack of transportation? No   Unable to get utilities (heat,electricity)? No          10/23/2024   Fall Risk   Fallen 2 or more times in the past year? No    Trouble with walking or balance? Yes        Patient-reported      10/23/2024   Activities of Daily Living- Home Safety   Needs help with the following daily activites None of the above   Safety concerns in the home None of the above       10/23/2024   Dental   Dentist two times every year? Yes       10/23/2024   Hearing Screening   Hearing concerns? None of the above    HEARING FREQUENCY:     Left Ear:   Right Ear:     500 Hz : Pass 500 Hz : Pass   1000 Hz: Pass 1000 Hz: Pass   2000 Hz: Pass 2000 Hz: Pass   4000 Hz: Pass 4000 Hz: Pass     10/23/2024   Driving Risk Screening   Patient/family members have concerns about driving No       10/23/2024   General Alertness/Fatigue Screening   Have you been more tired than usual lately? (!) YES       10/23/2024   Urinary Incontinence Screening   Bothered by leaking urine in past 6 months No       10/23/2024   TB Screening   Were you born outside of the [place]? No      Today's PHQ-2 Score:       10/23/2024     8:15 AM   PHQ-2 ( 1999 Pf[employer])   Q1: Little interest or pleasure in doing things 0   Q2: Feeling down, depressed or hopeless 0   PHQ-2 Score 0      10/23/2024   Substance Use   Alcohol more than 3/day or more than 7/wk No   Do you have a current opioid prescription? No   How severe/bad is pain from 1 to 10? 0/10 (No Pain)   Do you use any other substances recreationally? No      Social History     Tobacco Use    Smoking status: Never    Smokeless tobacco: Never   Substance Use Topics    Alcohol use: Yes     Alcohol/week: 0.0 - 1.0 standard drinks  of alcohol     Comment: ocassional    Drug use: No      10/23/2024   AAA Screening   Family history of Abdominal Aortic Aneurysm (AAA)? Unsure      ASCVD Risk   The 10-year ASCVD risk score (Alonso FERREIRA, et al., 2019) is: 26.4%    Values used to calculate the score:      Age: 68 years      Sex: Male      Is Non- : No      Diabetic: No      Tobacco smoker: No      Systolic Blood Pressure: 144 mmHg      Is BP treated: Yes      HDL Cholesterol: 38 mg/dL      Total Cholesterol: 175 mg/dL        Reviewed and updated as needed this visit by Provider      Problems             Lab work is in process  Labs reviewed in EPIC  Current providers sharing in care for this patient include:  Patient Care Team:  El Wray MD as PCP - General (Family Practice)  El Wray MD as Assigned PCP  Sierra Dove RPH as Pharmacist (Pharmacist)    The following health maintenance items are reviewed in Epic and correct as of today:  Health Maintenance   Topic Date Due    ZOSTER IMMUNIZATION (1 of 2) Never done    RSV VACCINE (1 - Risk 60-74 years 1-dose series) Never done    BMP  08/02/2024    COVID-19 Vaccine (3 - 2024-25 season) 09/01/2024    COLORECTAL CANCER SCREENING  09/27/2024    INFLUENZA VACCINE (1) 06/30/2025 (Originally 9/1/2024)    MEDICARE ANNUAL WELLNESS VISIT  10/23/2025    FALL RISK ASSESSMENT  10/23/2025    DTAP/TDAP/TD IMMUNIZATION (2 - Td or Tdap) 12/31/2025    ADVANCE CARE PLANNING  01/05/2026    GLUCOSE  08/02/2026    LIPID  06/29/2028    HEPATITIS C SCREENING  Completed    PHQ-2 (once per calendar year)  Completed    HPV IMMUNIZATION  Aged Out    MENINGITIS IMMUNIZATION  Aged Out    RSV MONOCLONAL ANTIBODY  Aged Out    Pneumococcal Vaccine: 65+ Years  Discontinued         Review of Systems  Constitutional, HEENT, cardiovascular, pulmonary, GI, , musculoskeletal, neuro, skin, endocrine and psych systems are negative, except as otherwise noted.     Objective   "  Exam  BP (!) 144/80   Pulse 53   Ht 1.784 m (5' 10.25\")   Wt 92.3 kg (203 lb 6.4 oz)   SpO2 97%   BMI 28.98 kg/m     Estimated body mass index is 28.98 kg/m  as calculated from the following:    Height as of this encounter: 1.784 m (5' 10.25\").    Weight as of this encounter: 92.3 kg (203 lb 6.4 oz).    Physical Exam  GENERAL: alert and no distress  EYES: Eyes grossly normal to inspection, PERRL and conjunctivae and sclerae normal  HENT: ear canals and TM's normal, nose and mouth without ulcers or lesions  NECK: no adenopathy, no asymmetry, masses, or scars  RESP: lungs clear to auscultation - no rales, rhonchi or wheezes  CV: irregularly irregular rhythm, normal S1 S2, no S3 or S4, no murmur, click or rub, peripheral pulses strong, and no peripheral edema  ABDOMEN: soft, nontender, no hepatosplenomegaly, no masses and bowel sounds normal  MS: no gross musculoskeletal defects noted, no edema  SKIN: no suspicious lesions or rashes  NEURO: Normal strength and tone, mentation intact and speech normal  PSYCH: mentation appears normal, affect normal/bright         10/23/2024   Mini Cog   Clock Draw Score 2 Normal   3 Item Recall 2 objects recalled   Mini Cog Total Score 4        Signed Electronically by: El Wray MD  "

## 2025-01-18 ENCOUNTER — HOSPITAL ENCOUNTER (EMERGENCY)
Facility: CLINIC | Age: 70
Discharge: HOME | End: 2025-01-18
Attending: STUDENT IN AN ORGANIZED HEALTH CARE EDUCATION/TRAINING PROGRAM | Admitting: STUDENT IN AN ORGANIZED HEALTH CARE EDUCATION/TRAINING PROGRAM
Payer: COMMERCIAL

## 2025-01-18 VITALS
OXYGEN SATURATION: 93 % | RESPIRATION RATE: 18 BRPM | DIASTOLIC BLOOD PRESSURE: 92 MMHG | TEMPERATURE: 99.7 F | SYSTOLIC BLOOD PRESSURE: 180 MMHG | BODY MASS INDEX: 28.88 KG/M2 | HEIGHT: 69 IN | HEART RATE: 92 BPM | WEIGHT: 195 LBS

## 2025-01-18 DIAGNOSIS — I10 ELEVATED BLOOD PRESSURE READING WITH DIAGNOSIS OF HYPERTENSION: ICD-10-CM

## 2025-01-18 DIAGNOSIS — U07.1 COVID-19 VIRUS INFECTION: Primary | ICD-10-CM

## 2025-01-18 LAB
FLUAV RNA SPEC QL NAA+PROBE: NEGATIVE
FLUBV RNA RESP QL NAA+PROBE: NEGATIVE
RSV RNA SPEC NAA+PROBE: NEGATIVE
SARS-COV-2 RNA RESP QL NAA+PROBE: POSITIVE

## 2025-01-18 PROCEDURE — 99283 EMERGENCY DEPT VISIT LOW MDM: CPT

## 2025-01-18 PROCEDURE — 87637 SARSCOV2&INF A&B&RSV AMP PRB: CPT | Performed by: STUDENT IN AN ORGANIZED HEALTH CARE EDUCATION/TRAINING PROGRAM

## 2025-01-18 ASSESSMENT — ACTIVITIES OF DAILY LIVING (ADL)
ADLS_ACUITY_SCORE: 41
ADLS_ACUITY_SCORE: 41

## 2025-01-18 ASSESSMENT — COLUMBIA-SUICIDE SEVERITY RATING SCALE - C-SSRS
6. HAVE YOU EVER DONE ANYTHING, STARTED TO DO ANYTHING, OR PREPARED TO DO ANYTHING TO END YOUR LIFE?: NO
2. HAVE YOU ACTUALLY HAD ANY THOUGHTS OF KILLING YOURSELF IN THE PAST MONTH?: NO
1. IN THE PAST MONTH, HAVE YOU WISHED YOU WERE DEAD OR WISHED YOU COULD GO TO SLEEP AND NOT WAKE UP?: NO

## 2025-01-18 NOTE — Clinical Note
Abdiaziz Kimball was seen and treated in our emergency department on 1/18/2025.  He may return to work on 01/22/2025.       If you have any questions or concerns, please don't hesitate to call.      Fish Giraldo MD

## 2025-01-19 NOTE — ED PROVIDER NOTES
"  Emergency Department Note      History of Present Illness     Chief Complaint   Cough    HPI   Abdiaziz Kimball is a very pleasant 69 year old male with history of paroxysmal atrial fibrillation on apixaban, hypertension, SUHA, presenting with cough.  He has had cough over the last 3 days.  He also has some low-grade fever and body aches.  Today, he felt generally weak.  No chest pain or shortness of breath.    Independent Historian   None    Review of External Notes   I personally reviewed notes from the patient's clinic visit dated  10/23/24 . This provided me with information regarding patient's baseline medical problems.     I personally reviewed the patient's chart, including available medication list and available past medical history, past surgical history, family history, and social history.    Physical Exam     Patient Vitals for the past 24 hrs:   BP Temp Temp src Pulse Resp SpO2 Height Weight   01/18/25 1947 -- -- -- -- -- -- 1.753 m (5' 9\") 88.5 kg (195 lb)   01/18/25 1943 (!) 180/92 99.7  F (37.6  C) Temporal 92 18 93 % -- --     Physical Exam  Vitals and nursing note reviewed.   Constitutional:       Appearance: Normal appearance. He is not ill-appearing or diaphoretic.   Cardiovascular:      Rate and Rhythm: Normal rate and regular rhythm.      Heart sounds: Normal heart sounds.   Pulmonary:      Effort: Pulmonary effort is normal.      Breath sounds: Normal breath sounds.   Neurological:      Mental Status: He is alert.         Diagnostics     Lab Results   Labs Ordered and Resulted from Time of ED Arrival to Time of ED Departure   INFLUENZA A/B, RSV AND SARS-COV2 PCR - Abnormal       Result Value    Influenza A PCR Negative      Influenza B PCR Negative      RSV PCR Negative      SARS CoV2 PCR Positive (*)        Imaging   No orders to display       EKG   No ECG performed.     Independent Interpretation - See ED Course Below    ED Course      Medications Administered   Medications - No data to " display    Procedures   Procedures   None performed    Discussion of Management - See ED Course Below    ED Course   Independent Interpretation / Discussion of Management / Repeat Assessments       Additional Documentation  None    Medical Decision Making / Diagnosis     CMS Diagnoses: None    MIPS       None    MDM   This patient has a cough.  Vital signs notable for elevated blood pressure but otherwise reassuring. Patient's pulmonary exam is unremarkable. Low likelihood of sepsis physiology or serious pathology such as pneumonia, bacteremia, etc., further lab and imaging work up not indicated at this time.  Did evaluate the patient for influenza and COVID given community prevalence.  Patient is COVID-positive.  This is consistent with the patient's presentation.  Will plan for isolation and symptomatic management at home.  Patient declines Paxlovid.  Return precautions provided.    Disposition   The patient was discharged.     Diagnosis     ICD-10-CM    1. COVID-19 virus infection  U07.1       2. Elevated blood pressure reading with diagnosis of hypertension  I10            Discharge Medications   New Prescriptions    No medications on file        Fish Giraldo MD  01/18/25 [id number]

## 2025-01-19 NOTE — ED TRIAGE NOTES
Pt reports cough since Thursday with fevers and body aches. Today legs felt weak.     Triage Assessment (Adult)       Row Name 01/18/25 1946          Triage Assessment    Airway WDL WDL        Respiratory WDL    Respiratory WDL X  cough        Skin Circulation/Temperature WDL    Skin Circulation/Temperature WDL WDL        Cardiac WDL    Cardiac WDL WDL        Peripheral/Neurovascular WDL    Peripheral Neurovascular WDL WDL        Cognitive/Neuro/Behavioral WDL    Cognitive/Neuro/Behavioral WDL WDL

## 2025-02-26 DIAGNOSIS — I48.0 PAROXYSMAL ATRIAL FIBRILLATION (H): ICD-10-CM

## 2025-02-27 RX ORDER — APIXABAN 2.5 MG/1
5 TABLET, FILM COATED ORAL DAILY
Qty: 180 TABLET | Refills: 3 | Status: SHIPPED | OUTPATIENT
Start: 2025-02-27

## 2025-02-27 NOTE — CONFIDENTIAL NOTE
Med: ELIQUIS  *REQUESTING 90 DAY SUPPLY WITH 3 REFILLS    LOV (related): 10/23/24 - CPX      Due for F/U around: 10/2025 FOR CPX    Next Appt: NONE

## 2025-07-22 ENCOUNTER — OFFICE VISIT (OUTPATIENT)
Dept: FAMILY MEDICINE | Facility: CLINIC | Age: 70
End: 2025-07-22

## 2025-07-22 VITALS
HEART RATE: 52 BPM | SYSTOLIC BLOOD PRESSURE: 125 MMHG | BODY MASS INDEX: 30.54 KG/M2 | HEIGHT: 69 IN | WEIGHT: 206.2 LBS | OXYGEN SATURATION: 95 % | DIASTOLIC BLOOD PRESSURE: 72 MMHG

## 2025-07-22 DIAGNOSIS — I10 ESSENTIAL HYPERTENSION: ICD-10-CM

## 2025-07-22 DIAGNOSIS — E78.00 HYPERCHOLESTEREMIA: ICD-10-CM

## 2025-07-22 DIAGNOSIS — R06.02 SHORTNESS OF BREATH: Primary | ICD-10-CM

## 2025-07-22 DIAGNOSIS — S90.129A BRUISE OF TOE: ICD-10-CM

## 2025-07-22 DIAGNOSIS — I48.0 PAROXYSMAL ATRIAL FIBRILLATION (H): ICD-10-CM

## 2025-07-22 LAB
% GRANULOCYTES: 67.1 % (ref 42.2–75.2)
ALBUMIN SERPL BCG-MCNC: 4.1 G/DL (ref 3.5–5.2)
ALP SERPL-CCNC: 106 U/L (ref 40–150)
ALT SERPL W P-5'-P-CCNC: 15 U/L (ref 0–70)
ANION GAP SERPL CALCULATED.3IONS-SCNC: 14 MMOL/L (ref 7–15)
AST SERPL W P-5'-P-CCNC: 17 U/L (ref 0–45)
BILIRUB SERPL-MCNC: 0.4 MG/DL
BUN SERPL-MCNC: 25.3 MG/DL (ref 8–23)
CALCIUM SERPL-MCNC: 9 MG/DL (ref 8.8–10.4)
CHLORIDE SERPL-SCNC: 103 MMOL/L (ref 98–107)
CHOLESTEROL: 182 MG/DL (ref 100–199)
CREAT SERPL-MCNC: 1.19 MG/DL (ref 0.67–1.17)
EGFRCR SERPLBLD CKD-EPI 2021: 65 ML/MIN/1.73M2
FASTING STATUS PATIENT QL REPORTED: YES
FASTING?: YES
GLUCOSE SERPL-MCNC: 95 MG/DL (ref 70–99)
HCO3 SERPL-SCNC: 26 MMOL/L (ref 22–29)
HCT VFR BLD AUTO: 42.3 % (ref 39–51)
HDL (RMG): 29 MG/DL (ref 40–?)
HEMOGLOBIN: 15.1 G/DL (ref 13.4–17.5)
LDL CALCULATED (RMG): 106 MG/DL (ref 0–130)
LYMPHOCYTES NFR BLD AUTO: 23.9 % (ref 20.5–51.1)
MCH RBC QN AUTO: 30.7 PG (ref 27–31)
MCHC RBC AUTO-ENTMCNC: 35.7 G/DL (ref 33–37)
MCV RBC AUTO: 86 FL (ref 80–100)
MONOCYTES NFR BLD AUTO: 9 % (ref 1.7–9.3)
PLATELET # BLD AUTO: 240 K/UL (ref 140–450)
POTASSIUM SERPL-SCNC: 3.4 MMOL/L (ref 3.4–5.3)
PROT SERPL-MCNC: 7.2 G/DL (ref 6.4–8.3)
RBC # BLD AUTO: 4.91 X10/CMM (ref 4.2–5.9)
SODIUM SERPL-SCNC: 143 MMOL/L (ref 135–145)
TRIGLYCERIDES (RMG): 235 MG/DL (ref 0–149)
WBC # BLD AUTO: 6.6 X10/CMM (ref 3.8–11)

## 2025-07-22 PROCEDURE — 3074F SYST BP LT 130 MM HG: CPT | Performed by: FAMILY MEDICINE

## 2025-07-22 PROCEDURE — 80053 COMPREHEN METABOLIC PANEL: CPT | Performed by: FAMILY MEDICINE

## 2025-07-22 PROCEDURE — 80061 LIPID PANEL: CPT | Mod: QW | Performed by: FAMILY MEDICINE

## 2025-07-22 PROCEDURE — 99214 OFFICE O/P EST MOD 30 MIN: CPT | Performed by: FAMILY MEDICINE

## 2025-07-22 PROCEDURE — G2211 COMPLEX E/M VISIT ADD ON: HCPCS | Performed by: FAMILY MEDICINE

## 2025-07-22 PROCEDURE — 3078F DIAST BP <80 MM HG: CPT | Performed by: FAMILY MEDICINE

## 2025-07-22 PROCEDURE — 85025 COMPLETE CBC W/AUTO DIFF WBC: CPT | Performed by: FAMILY MEDICINE

## 2025-07-22 PROCEDURE — 36415 COLL VENOUS BLD VENIPUNCTURE: CPT | Performed by: FAMILY MEDICINE

## 2025-07-22 PROCEDURE — 3049F LDL-C 100-129 MG/DL: CPT | Performed by: FAMILY MEDICINE

## 2025-07-22 RX ORDER — OMEGA-3/DHA/EPA/FISH OIL 60 MG-90MG
2000 CAPSULE ORAL
COMMUNITY

## 2025-07-22 NOTE — PROGRESS NOTES
"  [facility] [address] [place] [address]  Phone: [phone]  Fax: [phone]    Patient:  Abdiaziz Kimball, Date of birth 1955  Date of Visit:  07/22/2025  Referring Provider No ref. provider found      Assessment & Plan      Shortness of breath  When brushing teeth and then the cpap improves.    Bruise of toe  Unknown caus3    Paroxysmal atrial fibrillation (H)  Recently in sinus on eliques    Essential hypertension  At goal    Hypercholesteremia  Discussed current lipid results, previous results (if available) current guidelines (NCEP) for treatment and goals for lipids.    Discussed ongoing lifestyle modification, dietary changes (low fat, low simple carb) and regular aerobic exercise.    Discussed the link between dysmetabolic syndrome and impaired glucose tolerance seen in certain patterns of lipids.     Reviewed medication use for lipid lowering, including the statins are their possible side effects of myalgias, rhabdomyolysis, and liver toxicity.  Discussed the importance for aggressive management of dyslipidemia to prevent vascular complications later.              History of Present Illness     Pertinent history obtain from: patient    Pt reports difficulty with SOB, states this is something he has dealt with due to his Afib, but feels it has worsened in the last year. When he is attaching his CPAP and there's a delay or when he attempts to exercise are the times when the SOB is worse. Pt reports he has to stop and take many deep breaths to \"catch up\". He denies any accompanying chest pain, pressure or palpitations.    Pt also reports bruising on his 2nd & 3rd right toes, states he first noticed it Sunday night and does not recall any injury to the toes. He does note wearing steel toe boots to work, last day he worked was Sunday. Pt is not a smoker. He denies any pain in these digits, but states he does have mild neuropathy in his feet and may not be able to feel " "the pain.    Physical Exam   Vital signs:  /72   Pulse 52   Ht 1.758 m (5' 9.2\")   Wt 93.5 kg (206 lb 3.2 oz)   SpO2 95%   BMI 30.27 kg/m    GENERAL: alert and no distress  NECK: no adenopathy, no asymmetry, masses, or scars  RESP: lungs clear to auscultation - no rales, rhonchi or wheezes  CV: regular rate and rhythm, normal S1 S2, no S3 or S4, no murmur, click or rub, no peripheral edema  ABDOMEN: soft, nontender, no hepatosplenomegaly, no masses and bowel sounds normal  MS: no gross musculoskeletal defects noted, no edema  SKIN: no suspicious lesions or rashes  PSYCH: mentation appears normal, affect normal/bright    Data   Laboratory data and imaging listed below were reviewed as part of this encounter.               This note is for review and attestation by the supervising physician.  Gayle Olsen, MS4           "

## 2025-08-04 ENCOUNTER — TELEPHONE (OUTPATIENT)
Dept: FAMILY MEDICINE | Facility: CLINIC | Age: 70
End: 2025-08-04

## 2025-09-02 ENCOUNTER — TRANSFERRED RECORDS (OUTPATIENT)
Dept: FAMILY MEDICINE | Facility: CLINIC | Age: 70
End: 2025-09-02